# Patient Record
Sex: FEMALE | Race: WHITE | NOT HISPANIC OR LATINO | Employment: OTHER | URBAN - METROPOLITAN AREA
[De-identification: names, ages, dates, MRNs, and addresses within clinical notes are randomized per-mention and may not be internally consistent; named-entity substitution may affect disease eponyms.]

---

## 2017-01-23 ENCOUNTER — TRANSCRIBE ORDERS (OUTPATIENT)
Dept: ADMINISTRATIVE | Facility: HOSPITAL | Age: 77
End: 2017-01-23

## 2017-01-23 DIAGNOSIS — Z12.31 OTHER SCREENING MAMMOGRAM: Primary | ICD-10-CM

## 2017-02-02 ENCOUNTER — HOSPITAL ENCOUNTER (OUTPATIENT)
Dept: BONE DENSITY | Facility: IMAGING CENTER | Age: 77
Discharge: HOME | End: 2017-02-02
Payer: MEDICARE

## 2017-02-02 DIAGNOSIS — Z12.31 OTHER SCREENING MAMMOGRAM: ICD-10-CM

## 2017-12-28 ENCOUNTER — TRANSCRIBE ORDERS (OUTPATIENT)
Dept: ADMINISTRATIVE | Facility: HOSPITAL | Age: 77
End: 2017-12-28

## 2017-12-28 DIAGNOSIS — Z12.39 SCREENING BREAST EXAMINATION: Primary | ICD-10-CM

## 2017-12-29 ENCOUNTER — HOSPITAL ENCOUNTER (OUTPATIENT)
Dept: RADIOLOGY | Age: 77
Discharge: HOME | End: 2017-12-29
Payer: MEDICARE

## 2017-12-29 ENCOUNTER — GENERIC CONVERSION - ENCOUNTER (OUTPATIENT)
Dept: OTHER | Facility: OTHER | Age: 77
End: 2017-12-29

## 2017-12-29 DIAGNOSIS — Z12.39 SCREENING BREAST EXAMINATION: ICD-10-CM

## 2017-12-29 PROCEDURE — G0202 SCR MAMMO BI INCL CAD: HCPCS

## 2018-09-26 ENCOUNTER — APPOINTMENT (EMERGENCY)
Dept: RADIOLOGY | Facility: HOSPITAL | Age: 78
End: 2018-09-26
Payer: MEDICARE

## 2018-09-26 ENCOUNTER — HOSPITAL ENCOUNTER (EMERGENCY)
Facility: HOSPITAL | Age: 78
Discharge: HOME | End: 2018-09-26
Admitting: EMERGENCY MEDICINE
Payer: MEDICARE

## 2018-09-26 VITALS
RESPIRATION RATE: 18 BRPM | HEIGHT: 65 IN | TEMPERATURE: 98.9 F | WEIGHT: 172.62 LBS | SYSTOLIC BLOOD PRESSURE: 142 MMHG | BODY MASS INDEX: 28.76 KG/M2 | DIASTOLIC BLOOD PRESSURE: 77 MMHG | OXYGEN SATURATION: 99 % | HEART RATE: 75 BPM

## 2018-09-26 DIAGNOSIS — G56.01 MILD CARPAL TUNNEL SYNDROME, RIGHT: Primary | ICD-10-CM

## 2018-09-26 PROCEDURE — 73110 X-RAY EXAM OF WRIST: CPT

## 2018-09-26 PROCEDURE — 99283 EMERGENCY DEPT VISIT LOW MDM: CPT

## 2018-09-26 RX ORDER — NAPROXEN 375 MG/1
375 TABLET ORAL 2 TIMES DAILY PRN
Qty: 20 TABLET | Refills: 0 | Status: SHIPPED | OUTPATIENT
Start: 2018-09-26 | End: 2020-07-01 | Stop reason: HOSPADM

## 2018-09-26 RX ORDER — NAPROXEN 250 MG/1
375 TABLET ORAL ONCE
Status: COMPLETED | OUTPATIENT
Start: 2018-09-26 | End: 2018-09-26

## 2018-09-26 RX ADMIN — NAPROXEN 375 MG: 250 TABLET ORAL at 02:01

## 2018-09-26 NOTE — ED PROVIDER NOTES
History  Chief Complaint   Patient presents with    Wrist Pain     Presents for pain to right inner wrist  Reports cutting through frozen bread approx  2000 last evevning with no pain at that time  Newly increased pain and limited ROM to right wrist at this time  No prior hx  injury to wrist  No meds PTO  Wrist Pain, right  -2000 this evening, patient cut through frozen bread  -Patient said she believes she "strained something"  -2130 patient developed volar aspect wrist pain  -Described as dull  -Pain is localized to wrist  -Only on the volar aspect of wrist  -Has not happened in the past  -no numbness or tingling in fingers  -normal active and passive range of motion as per patient  -self reports tenderness to palpation along the volar aspect of her wrist  -no noted ecchymoses after the injury            Prior to Admission Medications   Prescriptions Last Dose Informant Patient Reported? Taking?   sertraline (ZOLOFT) 50 mg tablet   Yes Yes   Sig: Take 1 tablet by mouth daily      Facility-Administered Medications: None       History reviewed  No pertinent past medical history  Past Surgical History:   Procedure Laterality Date    REPLACEMENT TOTAL KNEE Right 2004    TOTAL HIP ARTHROPLASTY Right 2006       History reviewed  No pertinent family history  I have reviewed and agree with the history as documented  Social History   Substance Use Topics    Smoking status: Never Smoker    Smokeless tobacco: Never Used    Alcohol use No        Review of Systems  Review of Systems: The Patient/Parent Denies the following: Negative, Except as noted in HPI  Physical Exam  Physical Exam  General: 69 y o  female patient, who appears their stated age, in mild distress  Skin: No rashes, masses, or lesions noted  Cardiac: Regular rate and rhythm, with no noted murmurs, rubs, or gallops  Pulmonary: Normal Appearance  Clear to auscultation, with no noted rales, rhonchi, or wheezes     MSK: Right Elbow: Normal Active ROM, Normal Passive ROM, No gross deformities noted, Normal Muscle Strength, Right Wrist/Hand: Positive Phalen's Test (Positive is numbness or parasthesia of the fingers, with the wrists in deep flexion, indicating carpel tunnel syndrome), Positive Tinel's Test (Positive is numbness or parasthesia with palpation over the median nerve, indicating carpel tunnel syndrome), No pain wit palpation of the anatomical snuffbox, Normal Active ROM, Normal Passive ROM, No gross deformities noted, Normal Muscle Strength, No pain with flexion, Positive pain with extension, No pain with rotation  All other Joints grossly normal           Vital Signs  ED Triage Vitals [09/26/18 0059]   Temperature Pulse Respirations Blood Pressure SpO2   98 9 °F (37 2 °C) 61 16 147/72 99 %      Temp Source Heart Rate Source Patient Position - Orthostatic VS BP Location FiO2 (%)   Oral Monitor Sitting Right arm --      Pain Score       Worst Possible Pain           Vitals:    09/26/18 0059 09/26/18 0203   BP: 147/72 142/77   Pulse: 61 75   Patient Position - Orthostatic VS: Sitting Sitting       Visual Acuity      ED Medications  Medications   naproxen (NAPROSYN) tablet 375 mg (375 mg Oral Given 9/26/18 0201)       Diagnostic Studies  Results Reviewed     None                 XR wrist 3+ views RIGHT   Final Result by Lew Avina MD (09/26 3332)      No acute osseous abnormality  Degenerative changes as described  Workstation performed: [id number]                    Procedures  Procedures       Phone Contacts  ED Phone Contact    ED Course                               MDM  Number of Diagnoses or Management Options  Mild carpal tunnel syndrome, right: new and requires workup  Diagnosis management comments: Most likely diagnosis is mild right carpal tunnel syndrome    Primary considerations diagnosis include the patient's acute onset of symptoms, the presence of the positive Tinel sign, presence of negative Finkelstein's test, as well as the positive Phalen test   In addition, the distribution of the patient's discomfort is following the median nerve, which is classic  As such, the patient was instructed to follow up with her primary care provider, as well utilize a cock-up wrist splint for symptoms  Amount and/or Complexity of Data Reviewed  Decide to obtain previous medical records or to obtain history from someone other than the patient: yes  Obtain history from someone other than the patient: yes  Review and summarize past medical records: yes  Discuss the patient with other providers: yes  Independent visualization of images, tracings, or specimens: yes    Risk of Complications, Morbidity, and/or Mortality  Presenting problems: moderate  Diagnostic procedures: moderate  Management options: moderate    Patient Progress  Patient progress: stable    CritCare Time    Disposition  Final diagnoses:   Mild carpal tunnel syndrome, right     Time reflects when diagnosis was documented in both MDM as applicable and the Disposition within this note     Time User Action Codes Description Comment    9/26/2018  1:55 AM Annette Duarte Add [G56 01] Mild carpal tunnel syndrome, right       ED Disposition     ED Disposition Condition Comment    Discharge  Jose E Mckenzie discharge to home/self care      Condition at discharge: Good        Follow-up Information     Follow up With Specialties Details Why [address], DO Family Medicine In 3 days If symptoms worsen, As needed [address] [facility]  [address]  [facility] [id number] [phone]            Discharge Medication List as of 9/26/2018  1:57 AM      START taking these medications    Details   naproxen (NAPROSYN) 375 mg tablet Take 1 tablet (375 mg total) by mouth 2 (two) times a day as needed for mild pain, Starting Wed 9/26/2018, Print         CONTINUE these medications which have NOT CHANGED    Details   sertraline (ZOLOFT) 50 mg tablet Take 1 tablet by mouth daily, Starting Wed 2/21/2018, Historical Med           No discharge procedures on file      ED Provider  Electronically Signed by           Kamron Ramos PA-C  09/27/18 6655

## 2018-09-26 NOTE — DISCHARGE INSTRUCTIONS
Carpal Tunnel Syndrome   WHAT YOU SHOULD KNOW:   Carpal tunnel syndrome (CTS) is a condition where there is increased pressure on the median nerve in the wrist  The median nerve controls muscles and feeling in the hand  Pressure may come from overuse and swelling of ligaments in the wrist    INSTRUCTIONS:   Medicines:   · NSAIDs:  These medicines decrease swelling and pain  NSAIDs are available without a doctor's order  Ask your primary healthcare provider which medicine is right for you and how much to take  Take as directed  NSAIDs can cause stomach bleeding or kidney problems if not taken correctly  · Take your medicine as directed  Call your healthcare provider if you think your medicine is not helping or if you have side effects  Tell him if you are allergic to any medicine  Keep a list of the medicines, vitamins, and herbs you take  Include the amounts, and when and why you take them  Bring the list or the pill bottles to follow-up visits  Carry your medicine list with you in case of an emergency  Follow up with your healthcare provider as directed:  Write down your questions so you remember to ask them during your visits  Manage your symptoms:   · Use ice:  Ice helps decrease swelling and pain in your wrist  Ice may also help prevent tissue damage  Use an ice pack or put crushed ice in a plastic bag  Cover the ice pack with a towel and place it on your wrist for 15 to 20 minutes every hour  · Get physical and occupational therapy:  Physical therapists will show you ways to exercise and strengthen your wrist  Occupational therapists will show you safe ways to use your wrist while you do your usual activities  · Rest your hands:  Let your hands rest for a short time between repetitive motions, such as typing  If you feel pain, stop what you are doing and gently massage your wrist and hand  · Use a wrist splint: This keeps your wrist straight or in a slightly bent position   A wrist splint decreases pressure on the median nerve by letting your wrist rest  You may need to wear the splint for up to 8 weeks  You may need to wear your wrist splint at night  · Evaluate your work habits:  Ask about ways to modify your work to help decrease your symptoms  Contact your primary healthcare provider if:   · Your symptoms are worse than before  · You have questions or concerns about your condition or care  Return to the emergency department if:   · You suddenly lose feeling and cannot move your hand  · Your hand suddenly changes color  © 2014 [employer] is for End User's use only and may not be sold, redistributed or otherwise used for commercial purposes  All illustrations and images included in CareNotes® are the copyrighted property of [employer]  or Luis Gonzalez  The above information is an  only  It is not intended as medical advice for individual conditions or treatments  Talk to your doctor, nurse or pharmacist before following any medical regimen to see if it is safe and effective for you

## 2018-10-11 ENCOUNTER — TRANSCRIBE ORDERS (OUTPATIENT)
Dept: PHYSICAL THERAPY | Facility: REHABILITATION | Age: 78
End: 2018-10-11

## 2018-10-11 ENCOUNTER — EVALUATION (OUTPATIENT)
Dept: PHYSICAL THERAPY | Facility: REHABILITATION | Age: 78
End: 2018-10-11
Payer: MEDICARE

## 2018-10-11 DIAGNOSIS — M53.3 SI (SACROILIAC) JOINT DYSFUNCTION: Primary | ICD-10-CM

## 2018-10-11 DIAGNOSIS — M53.3 DISORDER OF SACRUM: Primary | ICD-10-CM

## 2018-10-11 PROCEDURE — G8990 OTHER PT/OT CURRENT STATUS: HCPCS | Performed by: PHYSICAL THERAPIST

## 2018-10-11 PROCEDURE — 97161 PT EVAL LOW COMPLEX 20 MIN: CPT | Performed by: PHYSICAL THERAPIST

## 2018-10-11 PROCEDURE — G8991 OTHER PT/OT GOAL STATUS: HCPCS | Performed by: PHYSICAL THERAPIST

## 2018-10-11 PROCEDURE — 97110 THERAPEUTIC EXERCISES: CPT | Performed by: PHYSICAL THERAPIST

## 2018-10-11 NOTE — LETTER
2018    Park Dakin, [address] [facility] [address]    Patient: Di Sheikh   YOB: 1940   Date of Visit: 10/11/2018     Encounter Diagnosis     ICD-10-CM    1  SI (sacroiliac) joint dysfunction M53 3        Dear Dr Harvey Cleaves:    Please review the attached Plan of Care from [facility] recent visit  Please verify that you agree therapy should continue by signing the attached document and sending it back to our office  If you have any questions or concerns, please don't hesitate to call  Sincerely,    Rosana Stephen, PT      Referring Provider:      I certify that I have read the below Plan of Care and certify the need for these services furnished under this plan of treatment while under my care  Park Dakin, MD  [address]: [phone]          PT Evaluation     Today's date: 10/11/2018  Patient name: Di Sheikh  : 1940  MRN: [id number]  Referring provider: Gui Olmos MD  Dx:   Encounter Diagnosis     ICD-10-CM    1  SI (sacroiliac) joint dysfunction M53 3                   Assessment  Impairments: abnormal gait, abnormal muscle firing, abnormal or restricted ROM and impaired physical strength    Symptom irritability: low  Assessment details: Di Sheikh is a 69 y o  female with significant medical history of  who presents with chief complaint of right sided SI joint pain   Oneil Adjutant presents with evaluation findings of leg length discrepancy, decreased hip ROM and strength and significant trunk lean throughout ambulation  These deficits are manifested functionally in difficulty with prolonged sitting and prolonged walking and standing  Oneil Adjutant will benefit from physical therapy to improve hip ROM and strength, normalize gait pattern and allow for decreased pain during ambulation        Understanding of Dx/Px/POC: good   Prognosis: good    Plan  Patient would benefit from: skilled physical therapy  Planned therapy interventions: neuromuscular re-education, strengthening, stretching, manual therapy, therapeutic exercise, therapeutic activities, home exercise program, patient education, postural training and orthotic fitting/training  Frequency: 2x week  Duration in weeks: 12  Treatment plan discussed with: patient        Subjective Evaluation    History of Present Illness  Mechanism of injury: Pt reports that she saw an ortho who told her she has a problem with her SI joint  States that her symptoms began years ago and was intermittent and became more constant in March  Pt reports that's she is getting shots in her back of a holistic medicine  She describes her pain as intense and stabbing  States that currently she has no pain  States she has relief with heat  Pt reports that the pain is centered over the L PSIS  Pt denies pain down her leg  She denies numbness and tingling  States the pain becomes worse with prolonged sitting  States she feels good when her back is flat  Pt denies change in bowel/bladder habits  States she also has pain with prolonged standing  States the pain is present about 50% of the time  Pt reports she had an MRI which showed degenerative changes  Pt goals for therapy include decreasing pain and to be able to walk without pain  Pain  At best pain ratin  At worst pain rating: 10          Objective     Special Questions  Negative for night pain, disturbed sleep, bladder dysfunction, bowel dysfunction, saddle (S4) numbness and history of trauma    Palpation     Additional Palpation Details  No pain over paraspinals or throughout lumbar spine  Specific point tenderness over R PSIS with no pain over posterior musculature of lateral hip  Tenderness     Lumbar Spine  No tenderness in the spinous process, facet joint and pedicle  Left Hip   No tenderness in the PSIS  Right Hip   Tenderness in the PSIS       Active Range of Motion     Lumbar   Flexion: WFL  Extension: WFL  Left lateral flexion: WFL  Right lateral flexion: [facility]    Additional Active Range of Motion Details  Pain upon rising from flexion no pain with other motions    Strength/Myotome Testing     Left Hip   Planes of Motion   Flexion: 4-  Extension: 4  Abduction: 4    Right Hip   Planes of Motion   Flexion: 4  Extension: 4-  Abduction: 4-    Muscle Activation     Additional Muscle Activation Details  Fair TA contraction    Tests     Lumbar     Left   Negative crossed SLR  Right   Negative passive SLR  Left Pelvic Girdle/Sacrum   Negative: sacral spring and thigh thrust      Right Pelvic Girdle/Sacrum   Negative: sacrum compression, sacral spring and thigh thrust      Additional Tests Details  Pain over reproduced with palpation of PSIS    General Comments     Lumbar Comments  Observable side lean to the L during ambulation  R leg longer in a supine position and a supine to knees bent position         Flowsheet Rows      Most Recent Value   PT/OT G-Codes   Current Score  51   Projected Score  62          Precautions: R hip replacement    Daily Treatment Diary     Manual  10/11            SI PA mobs or IR mobs                                                                      Exercise Diary  10/11            Heel raise fitting/education 10'            Bike             TA contraction             glute sets/bridge             clamshell                                                                                                                                                                                                                    Modalities

## 2018-10-11 NOTE — LETTER
December 3, 2018    Nancy Melendez DO  [address] [address]    Patient: Lisa Platt   YOB: 1940   Date of Visit: 10/11/2018     Encounter Diagnosis     ICD-10-CM    1  SI (sacroiliac) joint dysfunction M53 3        Dear Dr [employer]:    Please review the attached Plan of Care from [facility] recent visit  Please verify that you agree therapy should continue by signing the attached document and sending it back to our office  If you have any questions or concerns, please don't hesitate to call  Sincerely,    Tato Chavezch, PT      Referring Provider:      I certify that I have read the below Plan of Care and certify the need for these services furnished under this plan of treatment while under my care  Nancy Al   [address] [address]  VIA Facsimile: [phone]          PT Evaluation     Today's date: 12/3/2018  Patient name: Lisa Platt  : 1940  MRN: [id number]  Referring provider: Huong Jenkins DO  Dx:   Encounter Diagnosis     ICD-10-CM    1  SI (sacroiliac) joint dysfunction M53 3        Start Time: 1200  Stop Time: 1245  Total time in clinic (min): 45 minutes    Assessment  Impairments: abnormal gait, abnormal muscle firing, abnormal or restricted ROM and impaired physical strength    Symptom irritability: low  Assessment details: Lisa Platt is a 69 y o  female with significant medical history of  who presents with chief complaint of right sided SI joint pain   Khoa Roman presents with evaluation findings of leg length discrepancy, decreased hip ROM and strength and significant trunk lean throughout ambulation  These deficits are manifested functionally in difficulty with prolonged sitting and prolonged walking and standing  Khoa Roman will benefit from physical therapy to improve hip ROM and strength, normalize gait pattern and allow for decreased pain during ambulation        Understanding of Dx/Px/POC: good   Prognosis: good    Plan  Patient would benefit from: skilled physical therapy  Planned therapy interventions: neuromuscular re-education, strengthening, stretching, manual therapy, therapeutic exercise, therapeutic activities, home exercise program, patient education, postural training and orthotic fitting/training  Frequency: 2x week  Duration in weeks: 12  Treatment plan discussed with: patient        Subjective Evaluation    History of Present Illness  Mechanism of injury: Pt reports that she saw an ortho who told her she has a problem with her SI joint  States that her symptoms began years ago and was intermittent and became more constant in March  Pt reports that's she is getting shots in her back of a holistic medicine  She describes her pain as intense and stabbing  States that currently she has no pain  States she has relief with heat  Pt reports that the pain is centered over the L PSIS  Pt denies pain down her leg  She denies numbness and tingling  States the pain becomes worse with prolonged sitting  States she feels good when her back is flat  Pt denies change in bowel/bladder habits  States she also has pain with prolonged standing  States the pain is present about 50% of the time  Pt reports she had an MRI which showed degenerative changes  Pt goals for therapy include decreasing pain and to be able to walk without pain  Pain  At best pain ratin  At worst pain rating: 10          Objective     Special Questions  Negative for night pain, disturbed sleep, bladder dysfunction, bowel dysfunction, saddle (S4) numbness and history of trauma    Palpation     Additional Palpation Details  No pain over paraspinals or throughout lumbar spine  Specific point tenderness over R PSIS with no pain over posterior musculature of lateral hip  Tenderness     Lumbar Spine  No tenderness in the spinous process, facet joint and pedicle  Left Hip   No tenderness in the PSIS       Right Hip   Tenderness in the PSIS  Active Range of Motion     Lumbar   Flexion: WFL  Extension: WFL  Left lateral flexion: WFL  Right lateral flexion: [facility]    Additional Active Range of Motion Details  Pain upon rising from flexion no pain with other motions    Strength/Myotome Testing     Left Hip   Planes of Motion   Flexion: 4-  Extension: 4  Abduction: 4    Right Hip   Planes of Motion   Flexion: 4  Extension: 4-  Abduction: 4-    Muscle Activation     Additional Muscle Activation Details  Fair TA contraction    Tests     Lumbar     Left   Negative crossed SLR  Right   Negative passive SLR  Left Pelvic Girdle/Sacrum   Negative: sacral spring and thigh thrust      Right Pelvic Girdle/Sacrum   Negative: sacrum compression, sacral spring and thigh thrust      Additional Tests Details  Pain over reproduced with palpation of PSIS    General Comments     Lumbar Comments  Observable side lean to the L during ambulation  R leg longer in a supine position and a supine to knees bent position         Flowsheet Rows      Most Recent Value   PT/OT G-Codes   Current Score  51   Projected Score  62   FOTO information reviewed  Yes   Assessment Type  Evaluation   G code set  Other PT/OT Primary   Other PT Primary Current Status ()  CK   Other PT Primary Goal Status ()  CJ          Precautions: R hip replacement    Daily Treatment Diary     Manual  10/11            SI PA mobs or IR mobs                                                                      Exercise Diary  10/11            Heel raise fitting/education 10'            Bike             TA contraction             glute sets/bridge             clamshell                                                                                                                                                                                                                    Modalities

## 2018-10-11 NOTE — LETTER
2018    Niki Santos MD  [facility] [id number]    Patient: Anne Fishman   YOB: 1940   Date of Visit: 10/11/2018     Encounter Diagnosis     ICD-10-CM    1  SI (sacroiliac) joint dysfunction M53 3        Dear Dr Joe Tejeda:    Please review the attached Plan of Care from [facility] recent visit  Please verify that you agree therapy should continue by signing the attached document and sending it back to our office  If you have any questions or concerns, please don't hesitate to call  Sincerely,    Harris Gonzalez, PT      Referring Provider:      I certify that I have read the below Plan of Care and certify the need for these services furnished under this plan of treatment while under my care  Niki Santos MD   [address]: [phone]          PT Evaluation     Today's date: 2018  Patient name: Anne Fishman  : 1940  MRN: [id number]  Referring provider: Janae Clark MD  Dx:   Encounter Diagnosis     ICD-10-CM    1  SI (sacroiliac) joint dysfunction M53 3        Start Time: 1200  Stop Time: 2514  Total time in clinic (min): 45 minutes    Assessment  Impairments: abnormal gait, abnormal muscle firing, abnormal or restricted ROM and impaired physical strength    Symptom irritability: low  Assessment details: Anne Fishman is a 69 y o  female with significant medical history of  who presents with chief complaint of right sided SI joint pain   Elvin Julien presents with evaluation findings of leg length discrepancy, decreased hip ROM and strength and significant trunk lean throughout ambulation  These deficits are manifested functionally in difficulty with prolonged sitting and prolonged walking and standing  Elvin Julien will benefit from physical therapy to improve hip ROM and strength, normalize gait pattern and allow for decreased pain during ambulation        Understanding of Dx/Px/POC: good   Prognosis: good    Plan  Patient would benefit from: skilled physical therapy  Planned therapy interventions: neuromuscular re-education, strengthening, stretching, manual therapy, therapeutic exercise, therapeutic activities, home exercise program, patient education, postural training and orthotic fitting/training  Frequency: 2x week  Duration in weeks: 12  Treatment plan discussed with: patient        Subjective Evaluation    History of Present Illness  Mechanism of injury: Pt reports that she saw an ortho who told her she has a problem with her SI joint  States that her symptoms began years ago and was intermittent and became more constant in March  Pt reports that's she is getting shots in her back of a holistic medicine  She describes her pain as intense and stabbing  States that currently she has no pain  States she has relief with heat  Pt reports that the pain is centered over the L PSIS  Pt denies pain down her leg  She denies numbness and tingling  States the pain becomes worse with prolonged sitting  States she feels good when her back is flat  Pt denies change in bowel/bladder habits  States she also has pain with prolonged standing  States the pain is present about 50% of the time  Pt reports she had an MRI which showed degenerative changes  Pt goals for therapy include decreasing pain and to be able to walk without pain  Pain  At best pain ratin  At worst pain rating: 10          Objective     Special Questions  Negative for night pain, disturbed sleep, bladder dysfunction, bowel dysfunction, saddle (S4) numbness and history of trauma    Palpation     Additional Palpation Details  No pain over paraspinals or throughout lumbar spine  Specific point tenderness over R PSIS with no pain over posterior musculature of lateral hip  Tenderness     Lumbar Spine  No tenderness in the spinous process, facet joint and pedicle  Left Hip   No tenderness in the PSIS       Right Hip   Tenderness in the PSIS  Active Range of Motion     Lumbar   Flexion: WFL  Extension: WFL  Left lateral flexion: WFL  Right lateral flexion: [facility]    Additional Active Range of Motion Details  Pain upon rising from flexion no pain with other motions    Strength/Myotome Testing     Left Hip   Planes of Motion   Flexion: 4-  Extension: 4  Abduction: 4    Right Hip   Planes of Motion   Flexion: 4  Extension: 4-  Abduction: 4-    Muscle Activation     Additional Muscle Activation Details  Fair TA contraction    Tests     Lumbar     Left   Negative crossed SLR  Right   Negative passive SLR  Left Pelvic Girdle/Sacrum   Negative: sacral spring and thigh thrust      Right Pelvic Girdle/Sacrum   Negative: sacrum compression, sacral spring and thigh thrust      Additional Tests Details  Pain over reproduced with palpation of PSIS    General Comments     Lumbar Comments  Observable side lean to the L during ambulation  R leg longer in a supine position and a supine to knees bent position         Flowsheet Rows      Most Recent Value   PT/OT G-Codes   Current Score  51   Projected Score  62   FOTO information reviewed  Yes   Assessment Type  Evaluation   G code set  Other PT/OT Primary   Other PT Primary Current Status ()  CK   Other PT Primary Goal Status ()  CJ          Precautions: R hip replacement    Daily Treatment Diary     Manual  10/11            SI PA mobs or IR mobs                                                                      Exercise Diary  10/11            Heel raise fitting/education 10'            Bike             TA contraction             glute sets/bridge             clamshell                                                                                                                                                                                                                    Modalities

## 2018-10-11 NOTE — PROGRESS NOTES
PT Evaluation     Today's date: 12/3/2018  Patient name: Hyacinth Vazquez  : 1940  MRN: [id number]  Referring provider: Sharon Holman DO  Dx:   Encounter Diagnosis     ICD-10-CM    1  SI (sacroiliac) joint dysfunction M53 3        Start Time: 1200  Stop Time: 1245  Total time in clinic (min): 45 minutes    Assessment  Impairments: abnormal gait, abnormal muscle firing, abnormal or restricted ROM and impaired physical strength    Symptom irritability: low  Assessment details: Hyacinth Vazquez is a 69 y o  female with significant medical history of  who presents with chief complaint of right sided SI joint pain   Meryle Broker presents with evaluation findings of leg length discrepancy, decreased hip ROM and strength and significant trunk lean throughout ambulation  These deficits are manifested functionally in difficulty with prolonged sitting and prolonged walking and standing  Meryle Broker will benefit from physical therapy to improve hip ROM and strength, normalize gait pattern and allow for decreased pain during ambulation  Understanding of Dx/Px/POC: good   Prognosis: good    Plan  Patient would benefit from: skilled physical therapy  Planned therapy interventions: neuromuscular re-education, strengthening, stretching, manual therapy, therapeutic exercise, therapeutic activities, home exercise program, patient education, postural training and orthotic fitting/training  Frequency: 2x week  Duration in weeks: 12  Treatment plan discussed with: patient        Subjective Evaluation    History of Present Illness  Mechanism of injury: Pt reports that she saw an ortho who told her she has a problem with her SI joint  States that her symptoms began years ago and was intermittent and became more constant in March  Pt reports that's she is getting shots in her back of a holistic medicine  She describes her pain as intense and stabbing  States that currently she has no pain  States she has relief with heat   Pt reports that the pain is centered over the L PSIS  Pt denies pain down her leg  She denies numbness and tingling  States the pain becomes worse with prolonged sitting  States she feels good when her back is flat  Pt denies change in bowel/bladder habits  States she also has pain with prolonged standing  States the pain is present about 50% of the time  Pt reports she had an MRI which showed degenerative changes  Pt goals for therapy include decreasing pain and to be able to walk without pain  Pain  At best pain ratin  At worst pain rating: 10          Objective     Special Questions  Negative for night pain, disturbed sleep, bladder dysfunction, bowel dysfunction, saddle (S4) numbness and history of trauma    Palpation     Additional Palpation Details  No pain over paraspinals or throughout lumbar spine  Specific point tenderness over R PSIS with no pain over posterior musculature of lateral hip  Tenderness     Lumbar Spine  No tenderness in the spinous process, facet joint and pedicle  Left Hip   No tenderness in the PSIS  Right Hip   Tenderness in the PSIS  Active Range of Motion     Lumbar   Flexion: WFL  Extension: WFL  Left lateral flexion: WFL  Right lateral flexion: [facility]    Additional Active Range of Motion Details  Pain upon rising from flexion no pain with other motions    Strength/Myotome Testing     Left Hip   Planes of Motion   Flexion: 4-  Extension: 4  Abduction: 4    Right Hip   Planes of Motion   Flexion: 4  Extension: 4-  Abduction: 4-    Muscle Activation     Additional Muscle Activation Details  Fair TA contraction    Tests     Lumbar     Left   Negative crossed SLR  Right   Negative passive SLR       Left Pelvic Girdle/Sacrum   Negative: sacral spring and thigh thrust      Right Pelvic Girdle/Sacrum   Negative: sacrum compression, sacral spring and thigh thrust      Additional Tests Details  Pain over reproduced with palpation of PSIS    General Comments     Lumbar Comments  Observable side lean to the L during ambulation  R leg longer in a supine position and a supine to knees bent position         Flowsheet Rows      Most Recent Value   PT/OT G-Codes   Current Score  51   Projected Score  62   FOTO information reviewed  Yes   Assessment Type  Evaluation   G code set  Other PT/OT Primary   Other PT Primary Current Status ()  CK   Other PT Primary Goal Status ()  CJ          Precautions: R hip replacement    Daily Treatment Diary     Manual  10/11            SI PA mobs or IR mobs                                                                      Exercise Diary  10/11            Heel raise fitting/education 10'            Bike             TA contraction             glute sets/bridge             clamshell                                                                                                                                                                                                                    Modalities

## 2018-10-15 ENCOUNTER — OFFICE VISIT (OUTPATIENT)
Dept: PHYSICAL THERAPY | Facility: REHABILITATION | Age: 78
End: 2018-10-15
Payer: MEDICARE

## 2018-10-15 DIAGNOSIS — M53.3 SI (SACROILIAC) JOINT DYSFUNCTION: Primary | ICD-10-CM

## 2018-10-15 PROCEDURE — 97110 THERAPEUTIC EXERCISES: CPT | Performed by: PHYSICAL THERAPIST

## 2018-10-15 PROCEDURE — 97112 NEUROMUSCULAR REEDUCATION: CPT | Performed by: PHYSICAL THERAPIST

## 2018-10-15 NOTE — PROGRESS NOTES
Daily Note     Today's date: 10/15/2018  Patient name: Annalee Cruz  : 1940  MRN: [id number]  Referring provider: Wyatt Barrera MD  Dx:   Encounter Diagnosis     ICD-10-CM    1  SI (sacroiliac) joint dysfunction M53 3                   Subjective: "My back feels so good it scares me " Pt does states she had an injection the day before last visit but states she notices a difference if she does not wear her heel lift  Objective: See treatment diary below  Precautions: R hip replacement     Daily Treatment Diary      Manual  10/11  10/15                   SI PA mobs or IR mobs     NP                   Hip                                                                                                     Exercise Diary  10/11  10/15                   Heel raise fitting/education 10'                     Bike    8'                   TA contraction    10x3 breaths                   glute sets/bridge    2x10                   clamshell    2x10 each                                                                                                                                                                                                                                                                                                                                                                                                 Modalities                                                                                                      Assessment: Pt shows significant improvement in gait pattern upon arrival  Pt was able to perform TE without complaints of pain  Exhibited some lumbar pain with rolling which decreased when she was instructed to contract TA and roll her spine as a unit  Pt provided with HEP and information for ordering additional heel lifts  Plan: Progress treatment as tolerated

## 2018-10-18 ENCOUNTER — OFFICE VISIT (OUTPATIENT)
Dept: PHYSICAL THERAPY | Facility: REHABILITATION | Age: 78
End: 2018-10-18
Payer: MEDICARE

## 2018-10-18 DIAGNOSIS — M53.3 SI (SACROILIAC) JOINT DYSFUNCTION: Primary | ICD-10-CM

## 2018-10-18 PROCEDURE — 97110 THERAPEUTIC EXERCISES: CPT | Performed by: PHYSICAL THERAPIST

## 2018-10-18 PROCEDURE — 97112 NEUROMUSCULAR REEDUCATION: CPT | Performed by: PHYSICAL THERAPIST

## 2018-10-18 NOTE — PROGRESS NOTES
Daily Note     Today's date: 10/18/2018  Patient name: Lisa Platt  : 1940  MRN: [id number]  Referring provider: Radha Lay MD  Dx:   Encounter Diagnosis     ICD-10-CM    1  SI (sacroiliac) joint dysfunction M53 3                   Subjective: Pt reports that she has been feeling good  Would like to increase her HEP for home  Objective: See treatment diary below  Precautions: R hip replacement     Daily Treatment Diary      Manual  10/11  10/15                   SI PA mobs or IR mobs     NP                   Hip                                                                                                     Exercise Diary  10/11  10/15  10/18                 Heel raise fitting/education 10'                     Bike    8'  10'                 TA contraction    10x3 breaths  dc                 glute sets/bridge    2x10  2x15                 clamshell    2x10 each  2x10 each                 sit to stands                       Multifidus press      OTB 20 each                  TA with marching      20 each                   step ups                        lateral step ups      10 each                                                                                                                                                                                                                                                                       Modalities                                                                                                      Assessment: Pt  Required cuing for clamshells  Good performances of bridges  Pt reported fatigue during clamshells  Pt tx 1:1 with PT DADA  Plan: Progress treatment as tolerated

## 2018-10-22 ENCOUNTER — OFFICE VISIT (OUTPATIENT)
Dept: PHYSICAL THERAPY | Facility: REHABILITATION | Age: 78
End: 2018-10-22
Payer: MEDICARE

## 2018-10-22 DIAGNOSIS — M53.3 SI (SACROILIAC) JOINT DYSFUNCTION: Primary | ICD-10-CM

## 2018-10-22 PROCEDURE — 97110 THERAPEUTIC EXERCISES: CPT | Performed by: PHYSICAL THERAPIST

## 2018-10-22 PROCEDURE — 97112 NEUROMUSCULAR REEDUCATION: CPT | Performed by: PHYSICAL THERAPIST

## 2018-10-22 NOTE — PROGRESS NOTES
Daily Note     Today's date: 10/22/2018  Patient name: Henrique Mcduffie  : 1940  MRN: [id number]  Referring provider: Gil Rankin MD  Dx:   Encounter Diagnosis     ICD-10-CM    1  SI (sacroiliac) joint dysfunction M53 3                   Subjective: Pt reports that she feels good upon arrival  Presents with heel lifts that she purchased from [employer]  Objective: See treatment diary below  Precautions: R hip replacement     Daily Treatment Diary      Manual  10/11  10/15                   SI PA mobs or IR mobs     NP                   Hip                                                                                                     Exercise Diary  10/11  10/15  10/18  10/22               Heel raise fitting/education 10'                     Bike    8'  10'  8'               TA contraction    10x3 breaths  dc                 glute sets/bridge    2x10  2x15  3x10               clamshell    2x10 each  2x10 each  2x10 each               sit to stands                       Multifidus press      OTB 20 each  OTB 20 each                TA with marching      20 each   20 each                step ups                        lateral step ups      10 each 1x10 and 1x5                                                                                                                                                                                                                                                                     Modalities                                                                                                      Assessment: Pt was able to perform increased reps of step ups this visit but did report fatigue post treatment  Plan: Progress treatment as tolerated

## 2018-10-25 ENCOUNTER — APPOINTMENT (OUTPATIENT)
Dept: PHYSICAL THERAPY | Facility: REHABILITATION | Age: 78
End: 2018-10-25
Payer: MEDICARE

## 2018-10-29 ENCOUNTER — OFFICE VISIT (OUTPATIENT)
Dept: PHYSICAL THERAPY | Facility: REHABILITATION | Age: 78
End: 2018-10-29
Payer: MEDICARE

## 2018-10-29 DIAGNOSIS — M53.3 SI (SACROILIAC) JOINT DYSFUNCTION: Primary | ICD-10-CM

## 2018-10-29 PROCEDURE — 97110 THERAPEUTIC EXERCISES: CPT | Performed by: PHYSICAL THERAPIST

## 2018-10-29 PROCEDURE — 97112 NEUROMUSCULAR REEDUCATION: CPT | Performed by: PHYSICAL THERAPIST

## 2018-11-01 ENCOUNTER — OFFICE VISIT (OUTPATIENT)
Dept: PHYSICAL THERAPY | Facility: REHABILITATION | Age: 78
End: 2018-11-01
Payer: MEDICARE

## 2018-11-01 DIAGNOSIS — M53.3 SI (SACROILIAC) JOINT DYSFUNCTION: Primary | ICD-10-CM

## 2018-11-01 PROCEDURE — G8991 OTHER PT/OT GOAL STATUS: HCPCS | Performed by: PHYSICAL THERAPIST

## 2018-11-01 PROCEDURE — 97140 MANUAL THERAPY 1/> REGIONS: CPT | Performed by: PHYSICAL THERAPIST

## 2018-11-01 PROCEDURE — 97110 THERAPEUTIC EXERCISES: CPT | Performed by: PHYSICAL THERAPIST

## 2018-11-01 PROCEDURE — G8992 OTHER PT/OT  D/C STATUS: HCPCS | Performed by: PHYSICAL THERAPIST

## 2018-11-01 PROCEDURE — 97112 NEUROMUSCULAR REEDUCATION: CPT | Performed by: PHYSICAL THERAPIST

## 2018-11-01 NOTE — PROGRESS NOTES
Daily Note/Discharge    Today's date: 2018  Patient name: April Oneal  : 1940  MRN: [id number]  Referring provider: Sidney Chavez MD  Dx:   Encounter Diagnosis     ICD-10-CM    1  SI (sacroiliac) joint dysfunction M53 3                   Subjective: Pt reports that she aches all over but no longer has specific pain in her SI joint  Continues to have pain in her lumbar spine that has been consistent in nature for several years  States she feels like the problem for which see presented to PT has resolved and she is independent in HEP  Objective: See treatment diary below  Precautions: R hip replacement     Daily Treatment Diary      Manual  10/11  10/15  10/29  11/1               SI PA mobs or IR mobs     NP                   Hip PROM     Osiel GARLAND               Lumbar PA     [address]  DADA                                                                     Exercise Diary  10/11  10/15  10/18  10/22  10/29  11/1           Heel raise fitting/education 10'                     Bike    8'  10'  8'  9'  10'           TA contraction    10x3 breaths  dc                 glute sets/bridge    2x10  2x15  3x10  3x10             clamshell    2x10 each  2x10 each  2x10 each  2x10            sit to stands                       Multifidus press      OTB 20 each  OTB 20 each  OTB 20 each              TA with marching      20 each   20 each  20 each              step ups                        lateral step ups      10 each 1x10 and 1x5  2x10 each                                                                                                                                                                                                                                                                  Modalities                                                                                                      Assessment: Pt is able to ambulate without lumbar pain and has met all goals for PT and is independent in HEP  Plan: Pt discharged to [facility] at this time

## 2018-11-05 ENCOUNTER — APPOINTMENT (OUTPATIENT)
Dept: PHYSICAL THERAPY | Facility: REHABILITATION | Age: 78
End: 2018-11-05
Payer: MEDICARE

## 2018-11-08 ENCOUNTER — APPOINTMENT (OUTPATIENT)
Dept: PHYSICAL THERAPY | Facility: REHABILITATION | Age: 78
End: 2018-11-08
Payer: MEDICARE

## 2018-11-12 ENCOUNTER — APPOINTMENT (OUTPATIENT)
Dept: PHYSICAL THERAPY | Facility: REHABILITATION | Age: 78
End: 2018-11-12
Payer: MEDICARE

## 2018-11-15 ENCOUNTER — APPOINTMENT (OUTPATIENT)
Dept: PHYSICAL THERAPY | Facility: REHABILITATION | Age: 78
End: 2018-11-15
Payer: MEDICARE

## 2018-12-03 ENCOUNTER — TRANSCRIBE ORDERS (OUTPATIENT)
Dept: PHYSICAL THERAPY | Facility: REHABILITATION | Age: 78
End: 2018-12-03

## 2018-12-03 DIAGNOSIS — M53.3 SI (SACROILIAC) JOINT DYSFUNCTION: Primary | ICD-10-CM

## 2019-03-06 ENCOUNTER — TRANSCRIBE ORDERS (OUTPATIENT)
Dept: ADMINISTRATIVE | Facility: HOSPITAL | Age: 79
End: 2019-03-06

## 2019-03-06 DIAGNOSIS — Z12.39 SCREENING BREAST EXAMINATION: Primary | ICD-10-CM

## 2019-03-06 DIAGNOSIS — M81.0 OSTEOPOROSIS, UNSPECIFIED OSTEOPOROSIS TYPE, UNSPECIFIED PATHOLOGICAL FRACTURE PRESENCE: ICD-10-CM

## 2019-05-03 ENCOUNTER — LAB REQUISITION (OUTPATIENT)
Dept: LAB | Facility: HOSPITAL | Age: 79
End: 2019-05-03
Payer: MEDICARE

## 2019-05-03 DIAGNOSIS — R10.13 EPIGASTRIC PAIN: ICD-10-CM

## 2019-05-03 PROCEDURE — 88305 TISSUE EXAM BY PATHOLOGIST: CPT | Performed by: PATHOLOGY

## 2019-08-07 ENCOUNTER — EVALUATION (OUTPATIENT)
Dept: PHYSICAL THERAPY | Facility: REHABILITATION | Age: 79
End: 2019-08-07
Payer: MEDICARE

## 2019-08-07 ENCOUNTER — TRANSCRIBE ORDERS (OUTPATIENT)
Dept: PHYSICAL THERAPY | Facility: REHABILITATION | Age: 79
End: 2019-08-07

## 2019-08-07 VITALS — DIASTOLIC BLOOD PRESSURE: 95 MMHG | SYSTOLIC BLOOD PRESSURE: 123 MMHG

## 2019-08-07 DIAGNOSIS — R42 VERTIGO: Primary | ICD-10-CM

## 2019-08-07 DIAGNOSIS — R42 DIZZINESS AND GIDDINESS: ICD-10-CM

## 2019-08-07 PROCEDURE — 97161 PT EVAL LOW COMPLEX 20 MIN: CPT | Performed by: PHYSICAL THERAPIST

## 2019-08-07 NOTE — LETTER
2019    Jeneane Schlatter, [address] [facility]  [address]  [address] [facility] [address]    Patient: Rin Grullon   YOB: 1940   Date of Visit: 2019     Encounter Diagnosis     ICD-10-CM    1  Vertigo R42    2  Dizziness and giddiness R42        Dear Dr Darci Ny: Thank you for your recent referral of Rin Pump  Please review the attached evaluation summary from [facility] recent visit  Please verify that you agree with the plan of care by signing the attached order  If you have any questions or concerns, please do not hesitate to call  I sincerely appreciate the opportunity to share in the care of one of your patients and hope to have another opportunity to work with you in the near future  Sincerely,    Marina Pierre, PT      Referring Provider:      I certify that I have read the below Plan of Care and certify the need for these services furnished under this plan of treatment while under my care  Jeneane Schlatter, [address] [facility]  [address] [facility] [address]  VIA Facsimile: [phone]          PT Evaluation     Today's date: 2019  Patient name: Rin Grullon  : 1940  MRN: [id number]  Referring provider: Walter Herrmann DO  Dx:   Encounter Diagnosis     ICD-10-CM    1  Vertigo R42    2  Dizziness and giddiness R42        Start Time: 0845  Stop Time: 45  Total time in clinic (min): 60 minutes    Assessment  Assessment details: Rin Grullon is a 67 y o  female who presents with c/o dizziness, lightheadedness and nausea/vomiting  Patient's symptoms acutely reproduced with bed mobility, including supine to sit and rolling  Upon clinical examination today, Patient presents with normal examination  DGI score 24/24, normal Rhomberg, normal saccades and VOR as well as normal positional testing  No reproduction of symptoms at this time   Patient's history indicates possible BPPV, however, this seems to have spontaneously resolved  Patient will be placed on hold at this time and has been instructed to contact PT if she has any questions or reproduction of symptoms  Understanding of Dx/Px/POC: excellent  Goals  Short Term Goals: to be achieved in 2 weeks  1) Patient to be independent with basic HEP  2) Patient to report 50% in dizziness  Long Term Goals: to be achieved by discharge  1) FOTO equal to or greater than 83   2) Patient to be independent with comprehensive HEP  3) Patient to report resolution of dizziness  Plan  Patient would benefit from: skilled physical therapy  Planned therapy interventions: abdominal trunk stabilization, activity modification, balance/weight bearing training, behavior modification, body mechanics training, breathing training, canalith repositioning, flexibility, functional ROM exercises, home exercise program, therapeutic exercise, therapeutic activities, stretching, strengthening, postural training, patient education, neuromuscular re-education, massage, manual therapy and joint mobilization  Frequency: 1-3x week  Treatment plan discussed with: patient        Subjective Evaluation    History of Present Illness  Mechanism of injury: Patient reports that she first experienced vertigo 3 years ago  Patient was treated with Joseph Alvarenga and her symptoms resolved within the first several days  Approximately a week ago during the night she sat up to go to the bathroom and was unable to focus, stating "things were a little wavy " Patient felt unsteady on her feet and would experience symptoms when rolling over in bed  Patient's symptoms resolved for several days until one night she woke up again and felt severe dizziness and nausea  Patient took 15 minutes to get to the bathroom and felt weak  Patient had been throwing up  Patient was unable to lay down and slept upright that night  The following morning her symptoms were gone and she felt weak all day  Patient has not felt symptoms for 4 days now  Patient is now fearful of reproducing her symptoms and avoids certain movements  Patient has skipped her exercise class  Patient has been able to drive without symptoms  Patient denies experiencing dysphagia, dysarthria, lightheadedness or diplopia  Pain  No pain reported    Social Support    Employment status: not working    Diagnostic Tests  No diagnostic tests performed  Treatments  No previous or current treatments  Patient Goals  Patient goal: resolve dizziness        Objective     Concurrent Complaints  Positive for nausea/motion sickness  Negative for headaches, tinnitus, visual change, memory loss, aural fullness and poor concentration    Active Range of Motion   Cervical/Thoracic Spine       Cervical    Flexion: Neck active flexion: WNL  Extension: Neck active extension: WNL  Left lateral flexion: Neck active lateral bend left: WNL  Right lateral flexion: Neck active lateral bend right: WNL  Left rotation: Neck active rotation left: WNL  Right rotation: Neck active rotation right: WNL  Ambulation     Ambulation: Level Surfaces   Ambulation without assistive device: independent    Observational Gait   Gait: within functional limits   Neuro Exam:     Dizziness  Positive for vertigo and light-headedness  Negative for disequilibrium, oscillopsia, motion sickness, rocking or swaying, diplopia and floating or swimming  Exacerbating factors  Positive for bending over, rolling in bed and supine to/from sitting  Negative for looking up, walking, turning head and walking in busy environment       Headaches   Patient reports headaches: No      Cervical exam   Modified VBI   Left: asymptomatic  Right: asymptomatic  Seated posture: forward head posture    Oculomotor exam   Oculomotor ROM: WNL  Resting nystagmus: not present   Smooth pursuits: within normal limits  Vertical saccades: normal  Horizontal saccades: normal  Convergence: normal  Head thrust: left normal and right normal    Positional testing   Galo-Hallpike   Left posterior canal: WNL  Right posterior canal: WNL  Roll test   Left horizontal canal: WNL  Right horizontal canal: WNL      Balance assessments   MCTSIB   Eyes open level surface: WNL  Eyes open foam surface: WNL  Eyes closed level surface: WNL  Eyes closed foam surface:  WNL      Flowsheet Rows      Most Recent Value   PT/OT G-Codes   Current Score  58   Projected Score  83             Precautions: standard      Manual  8/7            Assessment GR                                                                    Exercise Diary  8/7                                                                                                                                                                                                                                                                                    Modalities  8/7

## 2019-08-07 NOTE — PROGRESS NOTES
PT Evaluation     Today's date: 2019  Patient name: Aubrie Raza  : 1940  MRN: [id number]  Referring provider: Joel Moreau DO  Dx:   Encounter Diagnosis     ICD-10-CM    1  Vertigo R42    2  Dizziness and giddiness R42        Start Time: 845  Stop Time: 945  Total time in clinic (min): 60 minutes    Assessment  Assessment details: Aubrie Raza is a 67 y o  female who presents with c/o dizziness, lightheadedness and nausea/vomiting  Patient's symptoms acutely reproduced with bed mobility, including supine to sit and rolling  Upon clinical examination today, Patient presents with normal examination  DGI score 24/24, normal Rhomberg, normal saccades and VOR as well as normal positional testing  No reproduction of symptoms at this time  Patient's history indicates possible BPPV, however, this seems to have spontaneously resolved  Patient will be placed on hold at this time and has been instructed to contact PT if she has any questions or reproduction of symptoms  Understanding of Dx/Px/POC: excellent  Goals  Short Term Goals: to be achieved in 2 weeks  1) Patient to be independent with basic HEP  2) Patient to report 50% in dizziness  Long Term Goals: to be achieved by discharge  1) FOTO equal to or greater than 83   2) Patient to be independent with comprehensive HEP  3) Patient to report resolution of dizziness  Plan  Patient would benefit from: skilled physical therapy  Planned therapy interventions: abdominal trunk stabilization, activity modification, balance/weight bearing training, behavior modification, body mechanics training, breathing training, canalith repositioning, flexibility, functional ROM exercises, home exercise program, therapeutic exercise, therapeutic activities, stretching, strengthening, postural training, patient education, neuromuscular re-education, massage, manual therapy and joint mobilization  Frequency: 1-3x week    Treatment plan discussed with: patient        Subjective Evaluation    History of Present Illness  Mechanism of injury: Patient reports that she first experienced vertigo 3 years ago  Patient was treated with Shantanu Block and her symptoms resolved within the first several days  Approximately a week ago during the night she sat up to go to the bathroom and was unable to focus, stating "things were a little wavy " Patient felt unsteady on her feet and would experience symptoms when rolling over in bed  Patient's symptoms resolved for several days until one night she woke up again and felt severe dizziness and nausea  Patient took 15 minutes to get to the bathroom and felt weak  Patient had been throwing up  Patient was unable to lay down and slept upright that night  The following morning her symptoms were gone and she felt weak all day  Patient has not felt symptoms for 4 days now  Patient is now fearful of reproducing her symptoms and avoids certain movements  Patient has skipped her exercise class  Patient has been able to drive without symptoms  Patient denies experiencing dysphagia, dysarthria, lightheadedness or diplopia  Pain  No pain reported    Social Support    Employment status: not working    Diagnostic Tests  No diagnostic tests performed  Treatments  No previous or current treatments  Patient Goals  Patient goal: resolve dizziness        Objective     Concurrent Complaints  Positive for nausea/motion sickness  Negative for headaches, tinnitus, visual change, memory loss, aural fullness and poor concentration    Active Range of Motion   Cervical/Thoracic Spine       Cervical    Flexion: Neck active flexion: WNL  Extension: Neck active extension: WNL  Left lateral flexion: Neck active lateral bend left: WNL  Right lateral flexion: Neck active lateral bend right: WNL  Left rotation: Neck active rotation left: WNL  Right rotation: Neck active rotation right: WNL               Ambulation     Ambulation: Level Surfaces Ambulation without assistive device: independent    Observational Gait   Gait: within functional limits   Neuro Exam:     Dizziness  Positive for vertigo and light-headedness  Negative for disequilibrium, oscillopsia, motion sickness, rocking or swaying, diplopia and floating or swimming  Exacerbating factors  Positive for bending over, rolling in bed and supine to/from sitting  Negative for looking up, walking, turning head and walking in busy environment  Headaches   Patient reports headaches: No      Cervical exam   Modified VBI   Left: asymptomatic  Right: asymptomatic  Seated posture: forward head posture    Oculomotor exam   Oculomotor ROM: WNL  Resting nystagmus: not present   Smooth pursuits: within normal limits  Vertical saccades: normal  Horizontal saccades: normal  Convergence: normal  Head thrust: left normal and right normal    Positional testing   Squire-Hallpike   Left posterior canal: WNL  Right posterior canal: WNL  Roll test   Left horizontal canal: WNL  Right horizontal canal: WNL      Balance assessments   MCTSIB   Eyes open level surface: WNL  Eyes open foam surface: WNL  Eyes closed level surface: WNL  Eyes closed foam surface:  WNL      Flowsheet Rows      Most Recent Value   PT/OT G-Codes   Current Score  58   Projected Score  83             Precautions: standard      Manual  8/7            Assessment GR                                                                    Exercise Diary  8/7                                                                                                                                                                                                                                                                                    Modalities  8/7

## 2019-08-14 NOTE — PROGRESS NOTES
Vianey Reasons has attended a total of 1 physical therapy appointments to date  Patient was last treated on 8/7/19 and has cancelled all remaining appointments scheduled  Goals, objective and subjective information unable to be updated at this time  Patient is completely symptom free and has not experienced any dizziness  Patient has been instructed to contact PT if she begins to notice a decline in symptoms or if she has any questions or concerns  Skilled physical therapy is not warranted at this time and Patient will be discharged

## 2019-11-25 ENCOUNTER — TRANSCRIBE ORDERS (OUTPATIENT)
Dept: ADMINISTRATIVE | Facility: HOSPITAL | Age: 79
End: 2019-11-25

## 2019-11-25 DIAGNOSIS — G45.3 AMAUROSIS FUGAX: Primary | ICD-10-CM

## 2019-12-10 ENCOUNTER — HOSPITAL ENCOUNTER (OUTPATIENT)
Dept: NON INVASIVE DIAGNOSTICS | Facility: CLINIC | Age: 79
Discharge: HOME | End: 2019-12-10
Payer: MEDICARE

## 2019-12-10 DIAGNOSIS — G45.3 AMAUROSIS FUGAX: ICD-10-CM

## 2019-12-10 PROCEDURE — 93880 EXTRACRANIAL BILAT STUDY: CPT | Performed by: SURGERY

## 2019-12-10 PROCEDURE — 93880 EXTRACRANIAL BILAT STUDY: CPT

## 2020-06-25 DIAGNOSIS — Z11.59 SPECIAL SCREENING EXAMINATION FOR UNSPECIFIED VIRAL DISEASE: Primary | ICD-10-CM

## 2020-06-25 PROCEDURE — U0003 INFECTIOUS AGENT DETECTION BY NUCLEIC ACID (DNA OR RNA); SEVERE ACUTE RESPIRATORY SYNDROME CORONAVIRUS 2 (SARS-COV-2) (CORONAVIRUS DISEASE [COVID-19]), AMPLIFIED PROBE TECHNIQUE, MAKING USE OF HIGH THROUGHPUT TECHNOLOGIES AS DESCRIBED BY CMS-2020-01-R: HCPCS

## 2020-06-27 ENCOUNTER — TELEPHONE (OUTPATIENT)
Dept: UROLOGY | Facility: CLINIC | Age: 80
End: 2020-06-27

## 2020-06-27 ENCOUNTER — APPOINTMENT (EMERGENCY)
Dept: CT IMAGING | Facility: HOSPITAL | Age: 80
DRG: 871 | End: 2020-06-27
Payer: MEDICARE

## 2020-06-27 ENCOUNTER — APPOINTMENT (EMERGENCY)
Dept: RADIOLOGY | Facility: HOSPITAL | Age: 80
DRG: 871 | End: 2020-06-27
Payer: MEDICARE

## 2020-06-27 ENCOUNTER — HOSPITAL ENCOUNTER (INPATIENT)
Facility: HOSPITAL | Age: 80
LOS: 4 days | Discharge: HOME | DRG: 871 | End: 2020-07-01
Attending: EMERGENCY MEDICINE | Admitting: INTERNAL MEDICINE
Payer: MEDICARE

## 2020-06-27 DIAGNOSIS — N15.1 RENAL ABSCESS, LEFT: ICD-10-CM

## 2020-06-27 DIAGNOSIS — K52.9 GASTROENTERITIS: ICD-10-CM

## 2020-06-27 DIAGNOSIS — N12 PYELONEPHRITIS: Primary | ICD-10-CM

## 2020-06-27 DIAGNOSIS — N39.0 UTI (URINARY TRACT INFECTION): ICD-10-CM

## 2020-06-27 DIAGNOSIS — N12 PYELONEPHRITIS: ICD-10-CM

## 2020-06-27 DIAGNOSIS — I95.9 HYPOTENSION: ICD-10-CM

## 2020-06-27 DIAGNOSIS — J18.9 PNEUMONIA: Primary | ICD-10-CM

## 2020-06-27 PROBLEM — R53.1 GENERALIZED WEAKNESS: Status: ACTIVE | Noted: 2020-06-27

## 2020-06-27 PROBLEM — R19.7 DIARRHEA: Status: ACTIVE | Noted: 2020-06-27

## 2020-06-27 LAB
ALBUMIN SERPL BCP-MCNC: 3 G/DL (ref 3.5–5)
ALP SERPL-CCNC: 62 U/L (ref 46–116)
ALT SERPL W P-5'-P-CCNC: 21 U/L (ref 12–78)
ANION GAP SERPL CALCULATED.3IONS-SCNC: 7 MMOL/L (ref 4–13)
APTT PPP: 34 SECONDS (ref 23–37)
AST SERPL W P-5'-P-CCNC: 36 U/L (ref 5–45)
BACTERIA UR QL AUTO: ABNORMAL /HPF
BASE EX.OXY STD BLDV CALC-SCNC: 90.2 % (ref 60–80)
BASE EXCESS BLDV CALC-SCNC: -0.8 MMOL/L
BASOPHILS # BLD AUTO: 0.01 THOUSANDS/ΜL (ref 0–0.1)
BASOPHILS NFR BLD AUTO: 0 % (ref 0–1)
BILIRUB SERPL-MCNC: 0.85 MG/DL (ref 0.2–1)
BILIRUB UR QL STRIP: NEGATIVE
BUN SERPL-MCNC: 16 MG/DL (ref 5–25)
CALCIUM SERPL-MCNC: 8.7 MG/DL (ref 8.3–10.1)
CHLORIDE SERPL-SCNC: 104 MMOL/L (ref 100–108)
CLARITY UR: ABNORMAL
CO2 SERPL-SCNC: 26 MMOL/L (ref 21–32)
COLOR UR: YELLOW
CREAT SERPL-MCNC: 0.79 MG/DL (ref 0.6–1.3)
CRP SERPL HS-MCNC: >90 MG/L
D DIMER PPP FEU-MCNC: 1.81 UG/ML FEU
EOSINOPHIL # BLD AUTO: 0.02 THOUSAND/ΜL (ref 0–0.61)
EOSINOPHIL NFR BLD AUTO: 0 % (ref 0–6)
ERYTHROCYTE [DISTWIDTH] IN BLOOD BY AUTOMATED COUNT: 13 % (ref 11.6–15.1)
GFR SERPL CREATININE-BSD FRML MDRD: 71 ML/MIN/1.73SQ M
GLUCOSE SERPL-MCNC: 133 MG/DL (ref 65–140)
GLUCOSE SERPL-MCNC: 147 MG/DL (ref 65–140)
GLUCOSE UR STRIP-MCNC: NEGATIVE MG/DL
HCO3 BLDV-SCNC: 22.4 MMOL/L (ref 24–30)
HCT VFR BLD AUTO: 38.8 % (ref 34.8–46.1)
HGB BLD-MCNC: 12.4 G/DL (ref 11.5–15.4)
HGB UR QL STRIP.AUTO: ABNORMAL
IMM GRANULOCYTES # BLD AUTO: 0.03 THOUSAND/UL (ref 0–0.2)
IMM GRANULOCYTES NFR BLD AUTO: 1 % (ref 0–2)
INR PPP: 1.21 (ref 0.84–1.19)
KETONES UR STRIP-MCNC: NEGATIVE MG/DL
L PNEUMO1 AG UR QL IA.RAPID: NEGATIVE
LACTATE SERPL-SCNC: 1.1 MMOL/L (ref 0.5–2)
LEUKOCYTE ESTERASE UR QL STRIP: ABNORMAL
LYMPHOCYTES # BLD AUTO: 0.38 THOUSANDS/ΜL (ref 0.6–4.47)
LYMPHOCYTES NFR BLD AUTO: 7 % (ref 14–44)
MCH RBC QN AUTO: 30.2 PG (ref 26.8–34.3)
MCHC RBC AUTO-ENTMCNC: 32 G/DL (ref 31.4–37.4)
MCV RBC AUTO: 95 FL (ref 82–98)
MONOCYTES # BLD AUTO: 0.09 THOUSAND/ΜL (ref 0.17–1.22)
MONOCYTES NFR BLD AUTO: 2 % (ref 4–12)
NEUTROPHILS # BLD AUTO: 4.96 THOUSANDS/ΜL (ref 1.85–7.62)
NEUTS SEG NFR BLD AUTO: 90 % (ref 43–75)
NITRITE UR QL STRIP: POSITIVE
NON-SQ EPI CELLS URNS QL MICRO: ABNORMAL /HPF
NRBC BLD AUTO-RTO: 0 /100 WBCS
NT-PROBNP SERPL-MCNC: 1162 PG/ML
O2 CT BLDV-SCNC: 16.6 ML/DL
PCO2 BLDV: 32.7 MM HG (ref 42–50)
PH BLDV: 7.45 [PH] (ref 7.3–7.4)
PH UR STRIP.AUTO: 6 [PH]
PLATELET # BLD AUTO: 126 THOUSANDS/UL (ref 149–390)
PMV BLD AUTO: 11.2 FL (ref 8.9–12.7)
PO2 BLDV: 62.1 MM HG (ref 35–45)
POTASSIUM SERPL-SCNC: 3.8 MMOL/L (ref 3.5–5.3)
PROT SERPL-MCNC: 6.5 G/DL (ref 6.4–8.2)
PROT UR STRIP-MCNC: ABNORMAL MG/DL
PROTHROMBIN TIME: 14.7 SECONDS (ref 11.6–14.5)
RBC # BLD AUTO: 4.1 MILLION/UL (ref 3.81–5.12)
RBC #/AREA URNS AUTO: ABNORMAL /HPF
S PNEUM AG UR QL: NEGATIVE
SARS-COV-2 RNA RESP QL NAA+PROBE: NEGATIVE
SARS-COV-2 RNA SPEC QL NAA+PROBE: NOT DETECTED
SODIUM SERPL-SCNC: 137 MMOL/L (ref 136–145)
SP GR UR STRIP.AUTO: 1.02 (ref 1–1.03)
TROPONIN I SERPL-MCNC: <0.02 NG/ML
TSH SERPL DL<=0.05 MIU/L-ACNC: 1.95 UIU/ML (ref 0.36–3.74)
UROBILINOGEN UR QL STRIP.AUTO: 0.2 E.U./DL
WBC # BLD AUTO: 5.49 THOUSAND/UL (ref 4.31–10.16)
WBC #/AREA URNS AUTO: ABNORMAL /HPF

## 2020-06-27 PROCEDURE — 74177 CT ABD & PELVIS W/CONTRAST: CPT

## 2020-06-27 PROCEDURE — 71275 CT ANGIOGRAPHY CHEST: CPT

## 2020-06-27 PROCEDURE — 96375 TX/PRO/DX INJ NEW DRUG ADDON: CPT

## 2020-06-27 PROCEDURE — 99285 EMERGENCY DEPT VISIT HI MDM: CPT

## 2020-06-27 PROCEDURE — 87505 NFCT AGENT DETECTION GI: CPT | Performed by: NURSE PRACTITIONER

## 2020-06-27 PROCEDURE — 85025 COMPLETE CBC W/AUTO DIFF WBC: CPT | Performed by: EMERGENCY MEDICINE

## 2020-06-27 PROCEDURE — 83605 ASSAY OF LACTIC ACID: CPT | Performed by: EMERGENCY MEDICINE

## 2020-06-27 PROCEDURE — 87493 C DIFF AMPLIFIED PROBE: CPT | Performed by: NURSE PRACTITIONER

## 2020-06-27 PROCEDURE — 87086 URINE CULTURE/COLONY COUNT: CPT | Performed by: EMERGENCY MEDICINE

## 2020-06-27 PROCEDURE — 96367 TX/PROPH/DG ADDL SEQ IV INF: CPT

## 2020-06-27 PROCEDURE — 99223 1ST HOSP IP/OBS HIGH 75: CPT | Performed by: INTERNAL MEDICINE

## 2020-06-27 PROCEDURE — 85379 FIBRIN DEGRADATION QUANT: CPT | Performed by: EMERGENCY MEDICINE

## 2020-06-27 PROCEDURE — 93005 ELECTROCARDIOGRAM TRACING: CPT

## 2020-06-27 PROCEDURE — 81001 URINALYSIS AUTO W/SCOPE: CPT | Performed by: EMERGENCY MEDICINE

## 2020-06-27 PROCEDURE — 96361 HYDRATE IV INFUSION ADD-ON: CPT

## 2020-06-27 PROCEDURE — 96366 THER/PROPH/DIAG IV INF ADDON: CPT

## 2020-06-27 PROCEDURE — 82805 BLOOD GASES W/O2 SATURATION: CPT | Performed by: EMERGENCY MEDICINE

## 2020-06-27 PROCEDURE — 85610 PROTHROMBIN TIME: CPT | Performed by: EMERGENCY MEDICINE

## 2020-06-27 PROCEDURE — 84484 ASSAY OF TROPONIN QUANT: CPT | Performed by: EMERGENCY MEDICINE

## 2020-06-27 PROCEDURE — 99291 CRITICAL CARE FIRST HOUR: CPT | Performed by: EMERGENCY MEDICINE

## 2020-06-27 PROCEDURE — 87040 BLOOD CULTURE FOR BACTERIA: CPT | Performed by: EMERGENCY MEDICINE

## 2020-06-27 PROCEDURE — 87186 SC STD MICRODIL/AGAR DIL: CPT | Performed by: EMERGENCY MEDICINE

## 2020-06-27 PROCEDURE — 71045 X-RAY EXAM CHEST 1 VIEW: CPT

## 2020-06-27 PROCEDURE — 87077 CULTURE AEROBIC IDENTIFY: CPT | Performed by: EMERGENCY MEDICINE

## 2020-06-27 PROCEDURE — 94664 DEMO&/EVAL PT USE INHALER: CPT

## 2020-06-27 PROCEDURE — 80053 COMPREHEN METABOLIC PANEL: CPT | Performed by: EMERGENCY MEDICINE

## 2020-06-27 PROCEDURE — 83880 ASSAY OF NATRIURETIC PEPTIDE: CPT | Performed by: EMERGENCY MEDICINE

## 2020-06-27 PROCEDURE — 84443 ASSAY THYROID STIM HORMONE: CPT | Performed by: EMERGENCY MEDICINE

## 2020-06-27 PROCEDURE — 1124F ACP DISCUSS-NO DSCNMKR DOCD: CPT | Performed by: PHYSICIAN ASSISTANT

## 2020-06-27 PROCEDURE — 86141 C-REACTIVE PROTEIN HS: CPT | Performed by: EMERGENCY MEDICINE

## 2020-06-27 PROCEDURE — 96365 THER/PROPH/DIAG IV INF INIT: CPT

## 2020-06-27 PROCEDURE — 87449 NOS EACH ORGANISM AG IA: CPT | Performed by: NURSE PRACTITIONER

## 2020-06-27 PROCEDURE — 99222 1ST HOSP IP/OBS MODERATE 55: CPT | Performed by: UROLOGY

## 2020-06-27 PROCEDURE — 87635 SARS-COV-2 COVID-19 AMP PRB: CPT | Performed by: EMERGENCY MEDICINE

## 2020-06-27 PROCEDURE — 99223 1ST HOSP IP/OBS HIGH 75: CPT | Performed by: HOSPITALIST

## 2020-06-27 PROCEDURE — 85730 THROMBOPLASTIN TIME PARTIAL: CPT | Performed by: EMERGENCY MEDICINE

## 2020-06-27 PROCEDURE — 36415 COLL VENOUS BLD VENIPUNCTURE: CPT | Performed by: EMERGENCY MEDICINE

## 2020-06-27 PROCEDURE — 82948 REAGENT STRIP/BLOOD GLUCOSE: CPT

## 2020-06-27 RX ORDER — ONDANSETRON 2 MG/ML
4 INJECTION INTRAMUSCULAR; INTRAVENOUS ONCE
Status: COMPLETED | OUTPATIENT
Start: 2020-06-27 | End: 2020-06-27

## 2020-06-27 RX ORDER — ALBUMIN, HUMAN INJ 5% 5 %
25 SOLUTION INTRAVENOUS ONCE
Status: DISCONTINUED | OUTPATIENT
Start: 2020-06-27 | End: 2020-06-27

## 2020-06-27 RX ORDER — ACETAMINOPHEN 325 MG/1
650 TABLET ORAL EVERY 6 HOURS PRN
Status: DISCONTINUED | OUTPATIENT
Start: 2020-06-27 | End: 2020-07-01 | Stop reason: HOSPADM

## 2020-06-27 RX ORDER — AZITHROMYCIN 250 MG/1
500 TABLET, FILM COATED ORAL EVERY 24 HOURS
Status: DISCONTINUED | OUTPATIENT
Start: 2020-06-28 | End: 2020-06-27

## 2020-06-27 RX ORDER — ALBUMIN, HUMAN INJ 5% 5 %
25 SOLUTION INTRAVENOUS ONCE
Status: COMPLETED | OUTPATIENT
Start: 2020-06-27 | End: 2020-06-27

## 2020-06-27 RX ORDER — AZITHROMYCIN 250 MG/1
500 TABLET, FILM COATED ORAL ONCE
Status: COMPLETED | OUTPATIENT
Start: 2020-06-27 | End: 2020-06-27

## 2020-06-27 RX ORDER — ALBUTEROL SULFATE 2.5 MG/3ML
2.5 SOLUTION RESPIRATORY (INHALATION) EVERY 6 HOURS PRN
Status: DISCONTINUED | OUTPATIENT
Start: 2020-06-27 | End: 2020-07-01 | Stop reason: HOSPADM

## 2020-06-27 RX ORDER — SODIUM CHLORIDE 9 MG/ML
125 INJECTION, SOLUTION INTRAVENOUS CONTINUOUS
Status: DISCONTINUED | OUTPATIENT
Start: 2020-06-27 | End: 2020-07-01

## 2020-06-27 RX ORDER — ACETAMINOPHEN 325 MG/1
650 TABLET ORAL ONCE
Status: COMPLETED | OUTPATIENT
Start: 2020-06-27 | End: 2020-06-27

## 2020-06-27 RX ORDER — GUAIFENESIN 600 MG
600 TABLET, EXTENDED RELEASE 12 HR ORAL 2 TIMES DAILY
Status: DISCONTINUED | OUTPATIENT
Start: 2020-06-27 | End: 2020-07-01 | Stop reason: HOSPADM

## 2020-06-27 RX ADMIN — ENOXAPARIN SODIUM 40 MG: 40 INJECTION SUBCUTANEOUS at 11:13

## 2020-06-27 RX ADMIN — SODIUM CHLORIDE 250 ML: 0.9 INJECTION, SOLUTION INTRAVENOUS at 03:36

## 2020-06-27 RX ADMIN — AZITHROMYCIN 500 MG: 250 TABLET, FILM COATED ORAL at 02:50

## 2020-06-27 RX ADMIN — ALBUMIN (HUMAN) 25 G: 12.5 INJECTION, SOLUTION INTRAVENOUS at 05:29

## 2020-06-27 RX ADMIN — IOHEXOL 100 ML: 350 INJECTION, SOLUTION INTRAVENOUS at 04:05

## 2020-06-27 RX ADMIN — ACETAMINOPHEN 650 MG: 325 TABLET, FILM COATED ORAL at 23:41

## 2020-06-27 RX ADMIN — SODIUM CHLORIDE 500 ML: 0.9 INJECTION, SOLUTION INTRAVENOUS at 06:10

## 2020-06-27 RX ADMIN — SERTRALINE HYDROCHLORIDE 50 MG: 50 TABLET ORAL at 11:12

## 2020-06-27 RX ADMIN — SODIUM CHLORIDE 125 ML/HR: 0.9 INJECTION, SOLUTION INTRAVENOUS at 20:19

## 2020-06-27 RX ADMIN — SODIUM CHLORIDE 250 ML: 0.9 INJECTION, SOLUTION INTRAVENOUS at 02:20

## 2020-06-27 RX ADMIN — CEFTRIAXONE SODIUM 1000 MG: 10 INJECTION, POWDER, FOR SOLUTION INTRAVENOUS at 02:49

## 2020-06-27 RX ADMIN — ONDANSETRON 4 MG: 2 INJECTION INTRAMUSCULAR; INTRAVENOUS at 02:17

## 2020-06-27 RX ADMIN — SODIUM CHLORIDE 125 ML/HR: 0.9 INJECTION, SOLUTION INTRAVENOUS at 02:41

## 2020-06-27 RX ADMIN — ACETAMINOPHEN 650 MG: 325 TABLET, FILM COATED ORAL at 02:15

## 2020-06-27 NOTE — CONSULTS
UROLOGY CONSULTATION NOTE     Patient Identifiers: Carmen Romero (MRN [id number])  Service Requesting Consultation:  [facility]  Service Providing Consultation:  Urology, Isaac Calloway MD    Date of Service: 6/27/2020  Consults    Reason for Consultation:  Pyelonephritis, concern for phlegmon versus abscess, left    History of Present Illness:     Carmen Romero is a 79 y o  old with a history of feeling confused and unwell, does not remember coming to the hospital, states that her  called and had her brought here  Workup has shown fevers, and imaging concerns for phlegmon versus developing abscess along with pyelonephritis, left hand side    This has been present for roughly a day or so and the patient has noted no flank pain, interestingly, and initially feeling confused, but now feeling much better after resuscitation and antibiotics as associated symptoms  Nothing and nothing are identified as aggravating and alleviating factors, respectively  Previous therapies include intravenous fluids and empiric antibiosis and previous work-up includes imaging and laboratory workup  The patient has not previously seen a urologist for this problem  The patient otherwise denies a history of urologic malignancy or malady  The patient denies a family history of urologic malignancy or malady  Past Medical, Past Surgical History:   History reviewed   No pertinent past medical history :    Past Surgical History:   Procedure Laterality Date    HEMORROIDECTOMY      REPLACEMENT TOTAL KNEE Right 2004    TOTAL HIP ARTHROPLASTY Right 2006   :    Medications, Allergies:     Current Facility-Administered Medications   Medication Dose Route Frequency    acetaminophen (TYLENOL) tablet 650 mg  650 mg Oral Q6H PRN    albuterol inhalation solution 2 5 mg  2 5 mg Nebulization Q6H PRN    [START ON 6/28/2020] cefTRIAXone (ROCEPHIN) 1,000 mg in dextrose 5 % 50 mL IVPB  1,000 mg Intravenous Q24H  enoxaparin (LOVENOX) subcutaneous injection 40 mg  40 mg Subcutaneous Daily    guaiFENesin (MUCINEX) 12 hr tablet 600 mg  600 mg Oral BID    sertraline (ZOLOFT) tablet 50 mg  50 mg Oral Daily    sodium chloride 0 9 % infusion  125 mL/hr Intravenous Continuous       Allergies: Allergies   Allergen Reactions    Benzocaine Other (See Comments)     anesthesia-not sure of specific drug    Nsaids GI Intolerance   :    Social and Family History:   Social History:   Social History     Tobacco Use    Smoking status: Never Smoker    Smokeless tobacco: Never Used   Substance Use Topics    Alcohol use: No    Drug use: No        Social History     Tobacco Use   Smoking Status Never Smoker   Smokeless Tobacco Never Used       Family History:  Family History   Problem Relation Age of Onset    Pancreatic cancer Brother    :     Review of Systems:     General: Fever, chills, or night sweats: positive  Cardiac: Negative for chest pain  Pulmonary: Negative for shortness of breath  Gastrointestinal: Abdominal pain negative  Nausea, vomiting, or diarrhea negative,  Genitourinary: See HPI above  Patient does not have hematuria  All other systems were queried and were negative except as listed above in HPI and here in the ROS  Physical Exam:   BP 92/51 (BP Location: Right arm)   Pulse 65   Temp 98 2 °F (36 8 °C) (Oral)   Resp 18   Ht 5' 7" (1 702 m)   Wt 72 6 kg (160 lb 0 9 oz)   SpO2 98%   BMI 25 07 kg/m² Temp (24hrs), Av 2 °F (37 9 °C), Min:98 2 °F (36 8 °C), Max:102 3 °F (39 1 °C)  current; Temperature: 98 2 °F (36 8 °C)  I/O last 24 hours: In: 1966 [I V :500; IV Piggyback:1050]  Out: -   General: Patient is pleasant and in NAD   Awake and alert, patient appears nontoxic, good historian    Cardiac: Peripheral edema: negative, peripheral pulses are present and indicated a regular rate and rhythm    Pulmonary: Non-labored breathing, speaking in full sentences, no wheezing, no coughing    Abdomen: Soft, non-tender, non-distended  Genitourinary: Negative CVA tenderness, negative suprapubic tenderness  Neurological: Cranial nerves II-XII are intact, no sensory deficits, no neurological deficits    Musculoskeletal: Extremities are warm, ROM is not assessed    Psychiatric: The patient's train of thought is linear and logical, mood and affect are normal    Lymphatic: There is not adenopathy in the abdominal region  Skin:  Intact, skin changes of aging or present    BIRD: none        Labs:     Lab Results   Component Value Date    HGB 12 4 06/27/2020    HCT 38 8 06/27/2020    WBC 5 49 06/27/2020     (L) 06/27/2020   ]    Lab Results   Component Value Date     11/24/2015    K 3 8 06/27/2020     06/27/2020    CO2 26 06/27/2020    BUN 16 06/27/2020    CREATININE 0 79 06/27/2020    CALCIUM 8 7 06/27/2020    GLUCOSE 120 11/24/2015   ]    Imaging:   I personally reviewed the images and report of the following studies, and reviewed them with the patient:    CT Abdomen/Pelvis: Left-sided phlegmonous change, versus developing abscess, versus renal mass (less likely given fevers), this is at the upper pole/mid pole and posterior on the left kidney      ASSESSMENT:     79 y o  old female with  pyelonephritis with phlegmon/developing abscess, feeling much better sense intravenous antibiosis has been administered  Does not appear to require percutaneous drainage currently given improvement in clinical symptoms, certainly does not require open biopsy or drainage or debridement at this time  Would recommend infectious disease consultation with regard to potential need for PICC line placement and intravenous antibiosis  This has become the preferred method of managing renal abscesses to avoid the morbidity of what would likely be a nephrectomy in this woman  PLAN:     Continued care per primary team     No plan for urologic intervention currently    Should the patient's clinical status worsen, would recommend interventional Radiology consultation for drainage of abscess with culture/cytology, and potential catheter placement in this area  Would recommend continuing empiric antibiosis, following up cultures, and would also recommend considering a infectious disease consultation with regard to PICC line placement, and need for long-term, intravenous antibiosis  From a urologic perspective we would repeat her imaging in a number of months to ensure resolution  I will order this follow-up scan  Please re-consult as necessary    The following portions of the patient's history were reviewed and updated as appropriate: allergies, current medications, past family history, past medical history, past social history, past surgical history and problem list     Portions of the above record have been created with voice recognition software  Occasional wrong word or "sound alike" substitution may have occurred due to the inherent limitations of voice recognition software  Read the chart carefully and recognize, using context, where substitution may have occurred  Thank you for allowing me to participate in this patients care  Please do not hesitate to call with any additional questions    Milena Clark MD

## 2020-06-27 NOTE — ASSESSMENT & PLAN NOTE
· Blood pressures ranging in 80s-100s/50s  · Patient is asymptomatic  · Obtain orthostatic blood pressure  · Patient received dose of albumin in ER  · IV hydration  · Monitor closely

## 2020-06-27 NOTE — CONSULTS
Consultation - Infectious Disease   Meenu Miranda 79 y o  female MRN: [id number]  Unit/Bed#: W -01 Encounter: [id number]      IMPRESSION & RECOMMENDATIONS:   Impression/Recommendations: This is a 79 y o  female, otherwise healthy, came to the ER last night with acute onset of fever/chills  She is found to have left-sided pyelonephritis with possible early abscess  1  Left-sided pyelonephritis with possible early abscess development  It is surprising that patient has no flank pain and minimal urinary symptoms  Her only presenting symptoms were fever and chills  Her UA is abnormal, with pyuria  Patient appears to be clinically improved  Fever and chills have resolved  She remains without flank pain  Will continue IV antibiotic for now, pending blood and urine culture results  Patient will need repeat imaging of kidney  If she develops abscess, it will need to be drained  Continue IV ceftriaxone for now  Monitor temperature/WBC  Monitor for development of flank pain  Follow-up on urine culture  2  Hypotension  It is unclear whether this was secondary to septic shock  Patient was never systemically toxic  Regardless, hypotension has resolved  Admission blood cultures have no growth thus far  Antibiotic plan as in above  Monitor temperature/WBC  Monitor hemodynamics  Follow-up on pending blood cultures  3  Abnormal CXR and CT showing bibasilar atelectasis  Patient does not have pneumonia clinically  I suspect atelectasis secondary to left kidney infection/abscess  Monitor for development of respiratory symptoms  4  Diarrhea  Etiology of this is unclear  Doubt C difficile colitis without recent antibiotic use  Follow-up on stool studies  Discussed with patient and  in detail regarding the above plan  Thank you for this consultation  We will follow along with you  HISTORY OF PRESENT ILLNESS:  Reason for Consult:  Pyelonephritis      HPI: Meenu Miranda is a 79 y o  female, otherwise healthy, came into the ER last night with acute onset of fever and chills  On presentation, she was afebrile with normal WBC  SBP was also wall  UA was abnormal   Abdomen/pelvis CT showed possible left pyelonephritis with early abscess  Patient was admitted and started on IV ceftriaxone  We are asked to evaluate the patient  Patient feels a lot better today  Fever and chills have resolved  She denies any flank pain  Patient states she may have increasing urinary frequency but no dysuria  She denies prior history of UTI or pyelonephritis  Patient has loose stool but no angelina diarrhea  REVIEW OF SYSTEMS:  A complete system-based review was done  Except for what is noted in HPI above, ROS of systems is otherwise negative  PAST MEDICAL HISTORY:  History reviewed  No pertinent past medical history  Past Surgical History:   Procedure Laterality Date    HEMORROIDECTOMY      REPLACEMENT TOTAL KNEE Right 2004    TOTAL HIP ARTHROPLASTY Right 2006     Problem list reviewed  FAMILY HISTORY:  Non-contributory    SOCIAL HISTORY:  Social History     Substance and Sexual Activity   Alcohol Use Not Currently     Social History     Substance and Sexual Activity   Drug Use No     Social History     Tobacco Use   Smoking Status Never Smoker   Smokeless Tobacco Never Used       ALLERGIES:  Allergies   Allergen Reactions    Benzocaine Other (See Comments)     anesthesia-not sure of specific drug    Nsaids GI Intolerance       MEDICATIONS:  All current active medications have been reviewed  Patient is currently on IV ceftriaxone      PHYSICAL EXAM:  Vitals:  Temp:  [98 2 °F (36 8 °C)-102 3 °F (39 1 °C)] 98 2 °F (36 8 °C)  HR:  [58-84] 65  Resp:  [16-18] 18  BP: ()/(51-71) 92/51  SpO2:  [94 %-98 %] 98 %  Temp (24hrs), Av 2 °F (37 9 °C), Min:98 2 °F (36 8 °C), Max:102 3 °F (39 1 °C)  Current: Temperature: 98 2 °F (36 8 °C)     Physical Exam:  General:  Well-nourished, well-developed, in no acute distress  Awake, alert and oriented x 3  Eyes:  Conjunctive clear with no hemorrhages or effusions  Oropharynx:  No ulcers, no lesions, pharynx benign, no tonsillitis  Neck:  Supple, no lymphadenopathy, no mass, nontender  Lungs:  Expansion symmetric, no rales, no wheezing, no accessory muscle use  Cardiac:  Regular rate and rhythm, normal S1, normal S2, no murmurs  Abdomen:  Soft, nondistended, non-tender, no HSM  Extremities:  No edema, no erythema, nontender  No ulcers  Skin:  No rashes, no ulcers  Neurological:  Moves all four extremities spontaneously, sensation grossly intact    LABS, IMAGING, & OTHER STUDIES:  Lab Results:  I have personally reviewed pertinent labs  Results from last 7 days   Lab Units 06/27/20  0216   POTASSIUM mmol/L 3 8   CHLORIDE mmol/L 104   CO2 mmol/L 26   BUN mg/dL 16   CREATININE mg/dL 0 79   EGFR ml/min/1 73sq m 71   CALCIUM mg/dL 8 7   AST U/L 36   ALT U/L 21   ALK PHOS U/L 62     Results from last 7 days   Lab Units 06/27/20  0216   WBC Thousand/uL 5 49   HEMOGLOBIN g/dL 12 4   PLATELETS Thousands/uL 126*     Results from last 7 days   Lab Units 06/27/20  0910 06/27/20  0217 06/27/20  0200   BLOOD CULTURE   --  Received in Microbiology Lab  Culture in Progress  Received in Microbiology Lab  Culture in Progress  LEGIONELLA URINARY ANTIGEN  Negative  --   --        Imaging Studies:   I have personally reviewed pertinent imaging study reports and images in PACS  CXR reviewed personally  Atelectasis without consolidations  Abdomen/pelvis CT reviewed personally  Area of hypodensity in left kidney  Bibasilar atelectasis  EKG, Pathology, and Other Studies:   I have personally reviewed pertinent reports

## 2020-06-27 NOTE — ASSESSMENT & PLAN NOTE
· Patient reports fatigue, nausea, vomiting, and fever  · Urinalysis:  Moderate leukocytes, positive nitrites, protein and moderate blood  RBCs 2-4, wbc's 20-30 and moderate bacteria  · Urine culture pending  · CT of abdomen: "There is a somewhat ill-defined heterogeneous area of hypodensity within the posterior aspect of the interpolar region left kidney which measures approximately 3 6 cm  The differential diagnosis includes focal pyelonephritis, early renal abscess formation, and renal neoplasm "  · Continue IV Rocephin  · Urology consult

## 2020-06-27 NOTE — ED PROVIDER NOTES
History  Chief Complaint   Patient presents with    Weakness - Generalized     Patient coming from home complaining of weakness and lethargic  Patient is oriented and is warm to touch  Patient is a 79year old female with worsening fever, cough, N/V/D, generalized weakness since yesterday  No travel  No known ill contacts  No sob  No urinary sx  No raw meat, eggs, fish or recent abx use  No dysgeusia or anosmia  Was last seen in this ED on 9/26/18 for mild carpal tunnel syndrome  [facility] website checked on this patient and no Rx found  History provided by:  Patient and EMS personnel   used: No        Prior to Admission Medications   Prescriptions Last Dose Informant Patient Reported? Taking? UNKNOWN TO PATIENT Not Taking at Unknown time  Yes No   naproxen (NAPROSYN) 375 mg tablet   No No   Sig: Take 1 tablet (375 mg total) by mouth 2 (two) times a day as needed for mild pain   Patient not taking: Reported on 8/7/2019   sertraline (ZOLOFT) 50 mg tablet   Yes Yes   Sig: Take 1 tablet by mouth daily      Facility-Administered Medications: None       History reviewed  No pertinent past medical history  Past Surgical History:   Procedure Laterality Date    HEMORROIDECTOMY      REPLACEMENT TOTAL KNEE Right 2004    TOTAL HIP ARTHROPLASTY Right 2006       Family History   Problem Relation Age of Onset    Pancreatic cancer Brother      I have reviewed and agree with the history as documented  E-Cigarette/Vaping     E-Cigarette/Vaping Substances     Social History     Tobacco Use    Smoking status: Never Smoker    Smokeless tobacco: Never Used   Substance Use Topics    Alcohol use: No    Drug use: No       Review of Systems   Constitutional: Positive for fatigue and fever  Respiratory: Positive for cough  Negative for shortness of breath  Gastrointestinal: Positive for diarrhea, nausea and vomiting  Genitourinary: Negative for difficulty urinating     Neurological: Positive for weakness  All other systems reviewed and are negative  Physical Exam  Physical Exam   Constitutional: She is oriented to person, place, and time  She appears distressed (moderate)  HENT:   Head: Normocephalic and atraumatic  Mucous membranes somewhat moist     Eyes: Pupils are equal, round, and reactive to light  EOM are normal  No scleral icterus  Neck: Normal range of motion  Neck supple  No JVD present  No tracheal deviation present  Cardiovascular: Normal rate, regular rhythm and normal heart sounds  No murmur heard  Pulmonary/Chest: Effort normal  No respiratory distress  She has no wheezes  She has no rales  Breath sounds diminished bilaterally with occasional rhonci  Abdominal: Soft  Bowel sounds are normal  She exhibits no distension  There is no tenderness  There is no guarding  Musculoskeletal: She exhibits no edema or deformity  Neurological: She is oriented to person, place, and time  Tired, weak appearing but answers all questions appropriately  No gross focal deficits  Skin: Skin is warm and dry  No rash noted  Psychiatric: She has a normal mood and affect  Nursing note and vitals reviewed        Vital Signs  ED Triage Vitals [06/27/20 0201]   Temperature Pulse Respirations Blood Pressure SpO2   (!) 102 3 °F (39 1 °C) 84 18 134/71 98 %      Temp Source Heart Rate Source Patient Position - Orthostatic VS BP Location FiO2 (%)   Oral Monitor Lying Right arm --      Pain Score       No Pain           Vitals:    06/27/20 0440 06/27/20 0445 06/27/20 0515 06/27/20 0543   BP: 99/54 96/54 (!) 86/53 (!) 85/52   Pulse: 70 70 66 69   Patient Position - Orthostatic VS: Sitting Lying Lying Lying         Visual Acuity      ED Medications  Medications   sodium chloride 0 9 % infusion (0 mL/hr Intravenous Stopped 6/27/20 0539)   sodium chloride 0 9 % bolus 500 mL (has no administration in time range)   sodium chloride 0 9 % bolus 250 mL (0 mL Intravenous Stopped 6/27/20 0241)   acetaminophen (TYLENOL) tablet 650 mg (650 mg Oral Given 6/27/20 0215)   ondansetron (ZOFRAN) injection 4 mg (4 mg Intravenous Given 6/27/20 0217)   ceftriaxone (ROCEPHIN) 1 g/50 mL in dextrose IVPB (0 mg Intravenous Stopped 6/27/20 0432)   azithromycin (ZITHROMAX) tablet 500 mg (500 mg Oral Given 6/27/20 0250)   sodium chloride 0 9 % bolus 250 mL (0 mL Intravenous Stopped 6/27/20 0432)   iohexol (OMNIPAQUE) 350 MG/ML injection (MULTI-DOSE) 100 mL (100 mL Intravenous Given 6/27/20 0405)   albumin human (FLEXBUMIN) 5 % injection 25 g (25 g Intravenous New Bag 6/27/20 0529)       Diagnostic Studies  Results Reviewed     Procedure Component Value Units Date/Time    High sensitivity CRP [[id number]] Collected:  06/27/20 0216    Lab Status:  Final result Specimen:  Blood from Arm, Right Updated:  06/27/20 0351     CRP, High Sensitivity >90 00 mg/L     Narrative:             HsCRP Level       Relative Risk           <1 0 mg/L          Low           1 0 to 3 0 mg/L    Average           >3 0 mg/L          High        Novel Coronavirus (Covid-19),PCR [facility] [[id number]]  (Normal) Collected:  06/27/20 0223    Lab Status:  Final result Specimen:  Throat Updated:  06/27/20 0332     SARS-CoV-2 Negative    Narrative: The specimen collection materials, transport medium, and/or testing methodology utilized in the production of these test results have been proven to be reliable in a limited validation with an abbreviated program under the Emergency Utilization Authorization provided by the FDA  Testing reported as "Presumptive positive" will be confirmed with secondary testing with a reference laboratory to ensure result accuracy  Clinical caution and judgement should be used with the interpretation of these results with consideration of the clinical impression and other laboratory testing    Testing reported as "Positive" or "Negative" has been proven to be accurate according to standard laboratory validation requirements  All testing is performed with control materials showing appropriate reactivity at standard intervals  TSH, 3rd generation with Free T4 reflex [[id number]]  (Normal) Collected:  06/27/20 0216    Lab Status:  Final result Specimen:  Blood from Arm, Right Updated:  06/27/20 0327     TSH 3RD GENERATON 1 946 uIU/mL     Narrative:       Patients undergoing fluorescein dye angiography may retain small amounts of fluorescein in the body for 48-72 hours post procedure  Samples containing fluorescein can produce falsely depressed TSH values  If the patient had this procedure,a specimen should be resubmitted post fluorescein clearance        NT-BNP PRO [[id number]]  (Abnormal) Collected:  06/27/20 0216    Lab Status:  Final result Specimen:  Blood from Arm, Right Updated:  06/27/20 0327     NT-proBNP 1,162 pg/mL     Fingerstick Glucose (POCT) [[id number]]  (Normal) Collected:  06/27/20 0238    Lab Status:  Final result Updated:  06/27/20 0318     POC Glucose 133 mg/dl     CBC and differential [[id number]]  (Abnormal) Collected:  06/27/20 0216    Lab Status:  Final result Specimen:  Blood from Arm, Right Updated:  06/27/20 0259     WBC 5 49 Thousand/uL      RBC 4 10 Million/uL      Hemoglobin 12 4 g/dL      Hematocrit 38 8 %      MCV 95 fL      MCH 30 2 pg      MCHC 32 0 g/dL      RDW 13 0 %      MPV 11 2 fL      Platelets 895 Thousands/uL      nRBC 0 /100 WBCs      Neutrophils Relative 90 %      Immat GRANS % 1 %      Lymphocytes Relative 7 %      Monocytes Relative 2 %      Eosinophils Relative 0 %      Basophils Relative 0 %      Neutrophils Absolute 4 96 Thousands/µL      Immature Grans Absolute 0 03 Thousand/uL      Lymphocytes Absolute 0 38 Thousands/µL      Monocytes Absolute 0 09 Thousand/µL      Eosinophils Absolute 0 02 Thousand/µL      Basophils Absolute 0 01 Thousands/µL     Lactic acid [[id number]]  (Normal) Collected:  06/27/20 0216    Lab Status:  Final result Specimen:  Blood from Arm, Right Updated:  06/27/20 0258     LACTIC ACID 1 1 mmol/L     Narrative:       Result may be elevated if tourniquet was used during collection      Troponin I [[id number]]  (Normal) Collected:  06/27/20 0216    Lab Status:  Final result Specimen:  Blood from Arm, Right Updated:  06/27/20 0257     Troponin I <0 02 ng/mL     Urine Microscopic [[id number]]  (Abnormal) Collected:  06/27/20 0242    Lab Status:  Final result Specimen:  Urine, Clean Catch Updated:  06/27/20 0257     RBC, UA 2-4 /hpf      WBC, UA 20-30 /hpf      Epithelial Cells Occasional /hpf      Bacteria, UA Moderate /hpf     Comprehensive metabolic panel [[id number]]  (Abnormal) Collected:  06/27/20 0216    Lab Status:  Final result Specimen:  Blood from Arm, Right Updated:  06/27/20 0255     Sodium 137 mmol/L      Potassium 3 8 mmol/L      Chloride 104 mmol/L      CO2 26 mmol/L      ANION GAP 7 mmol/L      BUN 16 mg/dL      Creatinine 0 79 mg/dL      Glucose 147 mg/dL      Calcium 8 7 mg/dL      AST 36 U/L      ALT 21 U/L      Alkaline Phosphatase 62 U/L      Total Protein 6 5 g/dL      Albumin 3 0 g/dL      Total Bilirubin 0 85 mg/dL      eGFR 71 ml/min/1 73sq m     Narrative:       Anu guidelines for Chronic Kidney Disease (CKD):     Stage 1 with normal or high GFR (GFR > 90 mL/min/1 73 square meters)    Stage 2 Mild CKD (GFR = 60-89 mL/min/1 73 square meters)    Stage 3A Moderate CKD (GFR = 45-59 mL/min/1 73 square meters)    Stage 3B Moderate CKD (GFR = 30-44 mL/min/1 73 square meters)    Stage 4 Severe CKD (GFR = 15-29 mL/min/1 73 square meters)    Stage 5 End Stage CKD (GFR <15 mL/min/1 73 square meters)  Note: GFR calculation is accurate only with a steady state creatinine    D-Dimer [[id number]]  (Abnormal) Collected:  06/27/20 0216    Lab Status:  Final result Specimen:  Blood from Arm, Right Updated:  06/27/20 0252     D-Dimer, Quant 1 81 ug/ml FEU     Protime-INR [[id number]]  (Abnormal) Collected:  06/27/20 0216 Lab Status:  Final result Specimen:  Blood from Arm, Right Updated:  06/27/20 0251     Protime 14 7 seconds      INR 1 21    APTT [[id number]]  (Normal) Collected:  06/27/20 0216    Lab Status:  Final result Specimen:  Blood from Arm, Right Updated:  06/27/20 0251     PTT 34 seconds     UA (URINE) with reflex to Scope [[id number]]  (Abnormal) Collected:  06/27/20 0242    Lab Status:  Final result Specimen:  Urine, Clean Catch Updated:  06/27/20 0248     Color, UA Yellow     Clarity, UA Slightly Cloudy     Specific Mcgregor, UA 1 020     pH, UA 6 0     Leukocytes, UA Moderate     Nitrite, UA Positive     Protein, UA 30 (1+) mg/dl      Glucose, UA Negative mg/dl      Ketones, UA Negative mg/dl      Urobilinogen, UA 0 2 E U /dl      Bilirubin, UA Negative     Blood, UA Moderate    Urine culture [[id number]] Collected:  06/27/20 0242    Lab Status: In process Specimen:  Urine, Clean Catch Updated:  06/27/20 0245    Blood gas, venous [[id number]]  (Abnormal) Collected:  06/27/20 0216    Lab Status:  Final result Specimen:  Blood from Arm, Right Updated:  06/27/20 0240     pH, Chris 7 453     pCO2, Chris 32 7 mm Hg      pO2, Chris 62 1 mm Hg      HCO3, Chris 22 4 mmol/L      Base Excess, Chris -0 8 mmol/L      O2 Content, Chris 16 6 ml/dL      O2 HGB, VENOUS 90 2 %     Blood culture #1 [[id number]] Collected:  06/27/20 0217    Lab Status: In process Specimen:  Blood from Arm, Left Updated:  06/27/20 0235    Blood culture #2 [[id number]] Collected:  06/27/20 0200    Lab Status: In process Specimen:  Blood from Arm, Right Updated:  06/27/20 0235                 PE Study with CT Abdomen and Pelvis with contrast   ED Interpretation by Angelita Matt MD (06/27 0522)   FINDINGS:      CHEST      PULMONARY ARTERIAL TREE:  No pulmonary embolus is seen  LUNGS:  Dependent changes are present  Roma Anger is bibasilar atelectasis, most pronounced posteriorly within the inferior lower lobes   Superimposed pneumonia should be excluded clinically  PLEURA:  Unremarkable  HEART/AORTA: Verta Shabbir is atherosclerosis of the thoracic aorta   No pericardial effusion  MEDIASTINUM AND NINI: Verta Shabbir is a small hiatal hernia  CHEST WALL AND LOWER NECK:   Unremarkable  ABDOMEN      LIVER/BILIARY TREE: Verta Shabbir is a 2 cm cyst in the dome of the liver      GALLBLADDER:  No calcified gallstones  No pericholecystic inflammatory change  SPLEEN:  Unremarkable  PANCREAS:  Unremarkable  ADRENAL GLANDS:  Unremarkable  KIDNEYS/URETERS: Verta Shabbir is an approximately 3 6 cm somewhat ill-defined heterogeneous area of hypodensity within the posterior aspect of the interpolar region left kidney   Although this could be related to a focal area of pyelonephritis and early renal    abscess formation, neoplasm cannot be excluded and must be considered   Follow-up is required   Urology consultation and follow-up is recommended  Verta Shabbir is mild infiltration of the fat adjacent to the left kidney   There is mild dilatation of the    proximal left ureter without evidence of hydronephrosis   The right kidney appears unremarkable  STOMACH AND BOWEL: Verta Shabbir is no evidence of bowel obstruction   There is mild colonic diverticulosis without evidence of acute diverticulitis  APPENDIX:  No findings to suggest appendicitis  ABDOMINOPELVIC CAVITY:  As described above   Additionally, there is a small amount of free fluid in the posterior pelvis   There is no evidence of pneumoperitoneum  VESSELS: Verta Shabbir is atherosclerosis  Verta Shabbir is no abdominal aortic aneurysm  PELVIS      REPRODUCTIVE ORGANS:  Evaluation is limited due to metallic streak artifact caused by a right hip replacement   Possible small uterine fibroids  URINARY BLADDER:  Evaluation is limited by metallic streak artifact caused by a right hip replacement   The visualized portion of the urinary bladder appears grossly unremarkable        ABDOMINAL WALL/INGUINAL REGIONS:  Unremarkable  OSSEOUS STRUCTURES:  No acute fracture or destructive osseous lesion   A right hip replacement is present  Altagracia Ellis is moderate to advanced degenerative change of the left hip   There is multilevel degenerative change of the spine   Possible disc bulging    at L4-5 and L5-S1   There is exaggeration of thoracic kyphosis  Impression:        There is a somewhat ill-defined heterogeneous area of hypodensity within the posterior aspect of the interpolar region left kidney which measures approximately 3 6 cm   The differential diagnosis includes focal pyelonephritis, early renal abscess    formation, and renal neoplasm   Follow-up is required   Urology consultation and follow-up is recommended   Please see above discussion  There is bibasilar atelectasis   Superimposed pneumonia should be excluded clinically  No evidence of pulmonary embolism is seen  Other findings as described above, please see discussion              I personally discussed this study with Karen Mckenzie on 6/27/2020 at 5:17 AM                               Workstation performed: [id number]         Final Result by Santino Caicedo MD (06/27 4933)      There is a somewhat ill-defined heterogeneous area of hypodensity within the posterior aspect of the interpolar region left kidney which measures approximately 3 6 cm  The differential diagnosis includes focal pyelonephritis, early renal abscess    formation, and renal neoplasm  Follow-up is required  Urology consultation and follow-up is recommended  Please see above discussion  There is bibasilar atelectasis  Superimposed pneumonia should be excluded clinically  No evidence of pulmonary embolism is seen  Other findings as described above, please see discussion               I personally discussed this study with Karen Mckenzie on 6/27/2020 at 5:17 AM                               Workstation performed: [id number]         XR chest 1 view portable   ED Interpretation by Yusuf Romero MD (06/27 0241)   RLL infiltrate read by me  Procedures  ECG 12 Lead Documentation Only  Date/Time: 6/27/2020 2:53 AM  Performed by: Yusuf Romero MD  Authorized by: Yusuf Romero MD     Indications / Diagnosis:  Hypoxia  ECG reviewed by me, the ED Provider: yes    Patient location:  ED  Previous ECG:     Previous ECG:  Compared to current    Comparison ECG info:  11/24/15    Similarity:  Changes noted (not stephanie now and NSSTT changes now)  Rate:     ECG rate:  76    ECG rate assessment: normal    Rhythm:     Rhythm: sinus rhythm    Ectopy:     Ectopy: none    QRS:     QRS axis:  Normal  Conduction:     Conduction: normal    ST segments:     ST segments:  Non-specific  T waves:     T waves: non-specific    Comments:      I do not agree with computer reading of possible ectopic atrial rhythm  CriticalCare Time  Performed by: Yusuf Romero MD  Authorized by: Yusuf Romero MD     Critical care provider statement:     Critical care time (minutes):  40    Critical care time was exclusive of:  Separately billable procedures and treating other patients    Critical care was necessary to treat or prevent imminent or life-threatening deterioration of the following conditions: hypotension, pyelonephritis      Critical care was time spent personally by me on the following activities:  Obtaining history from patient or surrogate, development of treatment plan with patient or surrogate, evaluation of patient's response to treatment, examination of patient, ordering and performing treatments and interventions, ordering and review of laboratory studies, ordering and review of radiographic studies, re-evaluation of patient's condition and review of old charts    I assumed direction of critical care for this patient from another provider in my specialty: no               ED Course  ED Course as of Jun 27 0604   Sat Jun 27, 2020   0242 IV rocephin and zithromax po ordered after looking at CXR for pneumonia        0251 Pulse ox decreased to 93% at times  0303 Labs, EKG, CXR d/w patient  1878 D/w patient  EXT SARS-COV-2: Negative   0334 BP decreased to more IVFs ordered  0522 CT d/w patient  1387 Repeat BP 86/53 so more IVFs ordered and IV albumin ordered  0530 D/w critical care AP who will see patient in ED        9792 D/w critical care AP who was patient in ED and patient's BP usually runs low as per patient and patient can go to med surg  HEART Risk Score      Most Recent Value   Heart Score Risk Calculator   History  0 Filed at: 06/27/2020 0258   ECG  1 Filed at: 06/27/2020 0258   Age  2 Filed at: 06/27/2020 0258   Risk Factors  0 Filed at: 06/27/2020 0258   Troponin  0 Filed at: 06/27/2020 0258   HEART Score  3 Filed at: 06/27/2020 0258                PERC Rule for PE      Most Recent Value   PERC Rule for PE   Age >=50  1 Filed at: 06/27/2020 0255   HR >=100     O2 Sat on room air < 95%     History of PE or DVT     Recent trauma or surgery     Hemoptysis     Exogenous estrogen     Unilateral leg swelling     PERC Rule for PE Results  1 Filed at: 06/27/2020 0255          Initial Sepsis Screening     Row Name 06/27/20 0302                Is the patient's history suggestive of a new or worsening infection? (!) Yes (Proceed)  -AO        Suspected source of infection  pneumonia  -AO        Are two or more of the following signs & symptoms of infection both present and new to the patient?   No  -AO        Indicate SIRS criteria  Hyperthemia > 38 3C (100 9F)  -AO        If the answer is yes to both questions, suspicion of sepsis is present          If severe sepsis is present AND tissue hypoperfusion perists in the hour after fluid resuscitation or lactate > 4, the patient meets criteria for SEPTIC SHOCK          Are any of the following organ dysfunction criteria present within 6 hours of suspected infection and SIRS criteria that are NOT considered to be chronic conditions?         Organ dysfunction          Date of presentation of severe sepsis          Time of presentation of severe sepsis          Tissue hypoperfusion persists in the hour after crystalloid fluid administration, evidenced, by either:          Was hypotension present within one hour of the conclusion of crystalloid fluid administration?         Date of presentation of septic shock          Time of presentation of septic shock            User Key  (r) = Recorded By, (t) = Taken By, (c) = Cosigned By    [address] Name Provider Koko Lopez MD Physician            Mihceline Perez Criteria for PE      Most Recent Value   Wells' Criteria for PE   Clinical signs and symptoms of DVT  0 Filed at: 06/27/2020 0255   PE is primary diagnosis or equally likely  3 Filed at: 06/27/2020 0255   HR >100  0 Filed at: 06/27/2020 0255   Immobilization at least 3 days or Surgery in the previous 4 weeks  0 Filed at: 06/27/2020 0255   Previous, objectively diagnosed PE or DVT  0 Filed at: 06/27/2020 0255   Hemoptysis  0 Filed at: 06/27/2020 0255   Malignancy with treatment within 6 months or palliative  0 Filed at: 06/27/2020 0255   Pepe' Criteria Total  3 Filed at: 06/27/2020 0255                  MDM  Number of Diagnoses or Management Options  Diagnosis management comments: DDx including but not limited to: sepsis, severe sepsis, UTI, pneumonia, viral illness, COVID 23, gastroenteritis, colitis, PE, PTX, CHF, ACS, MI, thyroid disease; doubt cellulitis, doubt sinusitis; doubt meningitis, or other intracranial process or acute surgical intraabdominal process           Amount and/or Complexity of Data Reviewed  Clinical lab tests: ordered and reviewed  Tests in the radiology section of CPT®: ordered and reviewed  Decide to obtain previous medical records or to obtain history from someone other than the patient: yes  Obtain history from someone other than the patient: yes  Review and summarize past medical records: yes  Discuss the patient with other providers: yes  Independent visualization of images, tracings, or specimens: yes          Disposition  Final diagnoses:   Pneumonia   UTI (urinary tract infection)   Gastroenteritis   Pyelonephritis   Hypotension     Time reflects when diagnosis was documented in both MDM as applicable and the Disposition within this note     Time User Action Codes Description Comment    6/27/2020  2:51 AM Debera Oiler Add [J18 9] Pneumonia     6/27/2020  2:51 AM Debera Oiler Add [N39 0] UTI (urinary tract infection)     6/27/2020  2:53 AM Debera Oiler Add [K52 9] Gastroenteritis     6/27/2020  5:22 AM Debera Oiler Add [N12] Pyelonephritis     6/27/2020  5:57 AM Debera Oiler Add [I95 9] Hypotension       ED Disposition     ED Disposition Condition Date/Time Comment    Admit Fair Sat Jun 27, 2020  6:03 AM Case was discussed with EDWIGE Linder  and the patient's admission status was agreed to be Admission Status: inpatient status to the service of Dr Shakeel Patton   Follow-up Information    None         Patient's Medications   Discharge Prescriptions    No medications on file     No discharge procedures on file      PDMP Review       Value Time User    PDMP Reviewed  Yes 6/27/2020  1:53 AM Cheyenne Anderson MD          ED Provider  Electronically Signed by           Cheyenne Anderson MD  06/27/20 3178

## 2020-06-27 NOTE — PLAN OF CARE
Problem: PAIN - ADULT  Goal: Verbalizes/displays adequate comfort level or baseline comfort level  Description  Interventions:  - Encourage patient to monitor pain and request assistance  - Assess pain using appropriate pain scale  - Administer analgesics based on type and severity of pain and evaluate response  - Implement non-pharmacological measures as appropriate and evaluate response  - Consider cultural and social influences on pain and pain management  - Notify physician/advanced practitioner if interventions unsuccessful or patient reports new pain  Outcome: Progressing     Problem: INFECTION - ADULT  Goal: Absence or prevention of progression during hospitalization  Description  INTERVENTIONS:  - Assess and monitor for signs and symptoms of infection  - Monitor lab/diagnostic results  - Monitor all insertion sites, i e  indwelling lines, tubes, and drains  - Monitor endotracheal if appropriate and nasal secretions for changes in amount and color  - [place] appropriate cooling/warming therapies per order  - Administer medications as ordered  - Instruct and encourage patient and family to use good hand hygiene technique  - Identify and instruct in appropriate isolation precautions for identified infection/condition  Outcome: Progressing  Goal: Absence of fever/infection during neutropenic period  Description  INTERVENTIONS:  - Monitor WBC    Outcome: Progressing     Problem: SAFETY ADULT  Goal: Patient will remain free of falls  Description  INTERVENTIONS:  - Assess patient frequently for physical needs  -  Identify cognitive and physical deficits and behaviors that affect risk of falls    -  [place] fall precautions as indicated by assessment   - Educate patient/family on patient safety including physical limitations  - Instruct patient to call for assistance with activity based on assessment  - Modify environment to reduce risk of injury  - Consider OT/PT consult to assist with strengthening/mobility  Outcome: Progressing  Goal: Maintain or return to baseline ADL function  Description  INTERVENTIONS:  -  Assess patient's ability to carry out ADLs; assess patient's baseline for ADL function and identify physical deficits which impact ability to perform ADLs (bathing, care of mouth/teeth, toileting, grooming, dressing, etc )  - Assess/evaluate cause of self-care deficits   - Assess range of motion  - Assess patient's mobility; develop plan if impaired  - Assess patient's need for assistive devices and provide as appropriate  - Encourage maximum independence but intervene and supervise when necessary  - Involve family in performance of ADLs  - Assess for home care needs following discharge   - Consider OT consult to assist with ADL evaluation and planning for discharge  - Provide patient education as appropriate  Outcome: Progressing  Goal: Maintain or return mobility status to optimal level  Description  INTERVENTIONS:  - Assess patient's baseline mobility status (ambulation, transfers, stairs, etc )    - Identify cognitive and physical deficits and behaviors that affect mobility  - Identify mobility aids required to assist with transfers and/or ambulation (gait belt, sit-to-stand, lift, walker, cane, etc )  - [place] fall precautions as indicated by assessment  - Record patient progress and toleration of activity level on Mobility SBAR; progress patient to next Phase/Stage  - Instruct patient to call for assistance with activity based on assessment  - Consider rehabilitation consult to assist with strengthening/weightbearing, etc   Outcome: Progressing     Problem: DISCHARGE PLANNING  Goal: Discharge to home or other facility with appropriate resources  Description  INTERVENTIONS:  - Identify barriers to discharge w/patient and caregiver  - Arrange for needed discharge resources and transportation as appropriate  - Identify discharge learning needs (meds, wound care, etc )  - Arrange for interpretive services to assist at discharge as needed  - Refer to Case Management Department for coordinating discharge planning if the patient needs post-hospital services based on physician/advanced practitioner order or complex needs related to functional status, cognitive ability, or social support system  Outcome: Progressing     Problem: Knowledge Deficit  Goal: Patient/family/caregiver demonstrates understanding of disease process, treatment plan, medications, and discharge instructions  Description  Complete learning assessment and assess knowledge base    Interventions:  - Provide teaching at level of understanding  - Provide teaching via preferred learning methods  Outcome: Progressing

## 2020-06-27 NOTE — ASSESSMENT & PLAN NOTE
· Patient reports complaints of fever, cough and shortness of breath  · PE study CT abdomen chest: "There is bibasilar atelectasis  No evidence of pulmonary embolism is seen    Treating as CAP  IV antibiotics: IV Rocephin and p o  Zithromax  Respiratory protocol, nebs PRN  Obtain sputum culture and Gram stain, strep and Legionella antigens  Obtain procalcitonin  · Monitor respiratory status  · Patient's current oxygen saturation of 97% on 2 L of supplemental oxygen via nasal cannula

## 2020-06-27 NOTE — ASSESSMENT & PLAN NOTE
· Patient presents to the ER with complaints of generalized weakness and lethargy  Patient reports worsening fever, cough, nausea, vomiting, diarrhea, and generalized weakness since yesterday  · COVID-19 negative  · Suspect in the setting of pyelonephritis and bilateral pneumonia  · Patient did not meet SIRS criteria on admission  Lactic acid: 1 1  Follow up on culture results  Monitor vital signs, laboratory studies  · See plan below  · PT/OT evaluations

## 2020-06-27 NOTE — H&P
[facility]    H&P- Frank Nolandgaurang 1940, 79 y o  female MRN: [id number]    Unit/Bed#: W -01 Encounter: [id number]    Primary Care Provider: Miya Porter DO   Date and time admitted to hospital: 6/27/2020  1:52 AM    * Generalized weakness  Assessment & Plan  · Patient presents to the ER with complaints of generalized weakness and lethargy  Patient reports worsening fever, cough, nausea, vomiting, diarrhea, and generalized weakness since yesterday  · COVID-19 negative  · Suspect in the setting of pyelonephritis and bilateral pneumonia  · Patient did not meet SIRS criteria on admission  Lactic acid: 1 1  Follow up on culture results  Monitor vital signs, laboratory studies  · See plan below  · PT/OT evaluations  Pyelonephritis  Assessment & Plan  · Patient reports fatigue, nausea, vomiting, and fever  · Urinalysis:  Moderate leukocytes, positive nitrites, protein and moderate blood  RBCs 2-4, wbc's 20-30 and moderate bacteria  · Urine culture pending  · CT of abdomen: "There is a somewhat ill-defined heterogeneous area of hypodensity within the posterior aspect of the interpolar region left kidney which measures approximately 3 6 cm  The differential diagnosis includes focal pyelonephritis, early renal abscess formation, and renal neoplasm "  · Continue IV Rocephin  · Urology consult  Bilateral pneumonia  Assessment & Plan  · Patient reports complaints of fever, cough and shortness of breath  · PE study CT abdomen chest: "There is bibasilar atelectasis  No evidence of pulmonary embolism is seen    Treating as CAP  IV antibiotics: IV Rocephin and p o  Zithromax  Respiratory protocol, nebs PRN  Obtain sputum culture and Gram stain, strep and Legionella antigens  Obtain procalcitonin  · Monitor respiratory status  · Patient's current oxygen saturation of 97% on 2 L of supplemental oxygen via nasal cannula      Hypotension  Assessment & Plan  · Blood pressures ranging in 80s-100s/50s  · Patient is asymptomatic  · Obtain orthostatic blood pressure  · Patient received dose of albumin in ER  · IV hydration  · Monitor closely  Diarrhea  Assessment & Plan  · IV hydration  · Obtain C diff sample and stool enteric bacterial panel  · Contact precautions  VTE Prophylaxis: Enoxaparin (Lovenox)  / sequential compression device   Code Status: DNR/DNI  POLST: POLST form is not discussed and not completed at this time  Discussion with family: Patient    Anticipated Length of Stay:  Patient will be admitted on an Inpatient basis with an anticipated length of stay of  > 2 midnights  Justification for Hospital Stay: IV antibiotics    Total Time for Visit, including Counseling / Coordination of Care: 1 hour  Greater than 50% of this total time spent on direct patient counseling and coordination of care  Chief Complaint:   Generalized weakness    History of Present Illness:    Chema Andres is a 79 y o  female who presents with generalized weakness, fever and chills  She reports a T-max of 80° F at home  Patient reports that starting on Wednesday she experienced nausea and vomiting  Additionally, she states she has had intermittent loose stools for the past 4 days as well  She reports occasional cough  She reports urinary frequency  She denies chest pain, flank pain, abdominal pain, shortness of breath, or dysuria  Upon evaluation in the ER, patient was found to be febrile with a T-max 102 3° F  Additionally, patient was hypotensive with blood pressures in 80s-90s/50s  Patient was found to be positive urinary tract infection and CT scan indicated possible pyelonephritis  Additionally, CT scan indicated possible bilateral pneumonia  Patient will be admitted for IV antibiotics and blood pressure monitoring  Review of Systems:    Review of Systems   Constitutional: Positive for activity change, chills, fatigue and fever  Respiratory: Positive for cough   Negative for shortness of breath  Cardiovascular: Negative for chest pain  Gastrointestinal: Positive for diarrhea, nausea and vomiting  Negative for abdominal pain  Genitourinary: Positive for frequency  Negative for dysuria  Musculoskeletal: Negative for arthralgias and gait problem  Neurological: Negative for dizziness and light-headedness  All other systems reviewed and are negative  Past Medical and Surgical History:     History reviewed  No pertinent past medical history  Past Surgical History:   Procedure Laterality Date    HEMORROIDECTOMY      REPLACEMENT TOTAL KNEE Right 2004    TOTAL HIP ARTHROPLASTY Right 2006       Meds/Allergies:    Prior to Admission medications    Medication Sig Start Date End Date Taking? Authorizing Provider   sertraline (ZOLOFT) 50 mg tablet Take 1 tablet by mouth daily 2/21/18  Yes Historical Provider, MD   naproxen (NAPROSYN) 375 mg tablet Take 1 tablet (375 mg total) by mouth 2 (two) times a day as needed for mild pain  Patient not taking: Reported on 8/7/2019 9/26/18   Britni Arellano PA-C   UNKNOWN TO PATIENT     Historical Provider, MD     I have reviewed home medications with patient personally  Allergies:    Allergies   Allergen Reactions    Benzocaine Other (See Comments)     anesthesia-not sure of specific drug    Nsaids GI Intolerance       Social History:     Marital Status: /Civil Union   Occupation: Unknown  Patient Pre-hospital Living Situation: Private residence  Patient Pre-hospital Level of Mobility: Independent  Patient Pre-hospital Diet Restrictions: None  Substance Use History:   Social History     Substance and Sexual Activity   Alcohol Use No     Social History     Tobacco Use   Smoking Status Never Smoker   Smokeless Tobacco Never Used     Social History     Substance and Sexual Activity   Drug Use No       Family History:    non-contributory    Physical Exam:     Vitals:   Blood Pressure: 92/51 (06/27/20 0801)  Pulse: 65 (06/27/20 0801)  Temperature: 98 2 °F (36 8 °C) (06/27/20 0615)  Temp Source: Oral (06/27/20 0615)  Respirations: 18 (06/27/20 0728)  Height: 5' 7" (170 2 cm) (06/27/20 0801)  Weight - Scale: 72 6 kg (160 lb 0 9 oz) (06/27/20 0801)  SpO2: 98 % (06/27/20 0806)    Physical Exam   Constitutional: She is oriented to person, place, and time  She appears well-developed and well-nourished  HENT:   Head: Normocephalic and atraumatic  Eyes: Conjunctivae are normal  No scleral icterus  Neck: Normal range of motion  Cardiovascular: Normal rate, regular rhythm and normal heart sounds  No murmur heard  Pulmonary/Chest: Effort normal  No respiratory distress  She has decreased breath sounds  She has no wheezes  She has no rhonchi  She has no rales  Abdominal: Soft  Bowel sounds are normal  She exhibits no distension  There is no tenderness  There is no CVA tenderness  Musculoskeletal: She exhibits no edema, tenderness or deformity  Neurological: She is alert and oriented to person, place, and time  Skin: Skin is warm and dry  Capillary refill takes 2 to 3 seconds  No erythema  No pallor  Psychiatric: Her speech is normal    Nursing note and vitals reviewed  Additional Data:     Lab Results: I have personally reviewed pertinent reports        Results from last 7 days   Lab Units 06/27/20  0216   WBC Thousand/uL 5 49   HEMOGLOBIN g/dL 12 4   HEMATOCRIT % 38 8   PLATELETS Thousands/uL 126*   NEUTROS PCT % 90*   LYMPHS PCT % 7*   MONOS PCT % 2*   EOS PCT % 0     Results from last 7 days   Lab Units 06/27/20  0216   SODIUM mmol/L 137   POTASSIUM mmol/L 3 8   CHLORIDE mmol/L 104   CO2 mmol/L 26   BUN mg/dL 16   CREATININE mg/dL 0 79   ANION GAP mmol/L 7   CALCIUM mg/dL 8 7   ALBUMIN g/dL 3 0*   TOTAL BILIRUBIN mg/dL 0 85   ALK PHOS U/L 62   ALT U/L 21   AST U/L 36   GLUCOSE RANDOM mg/dL 147*     Results from last 7 days   Lab Units 06/27/20  0216   INR  1 21*     Results from last 7 days   Lab Units 06/27/20  0238   POC GLUCOSE mg/dl 133         Results from last 7 days   Lab Units 06/27/20  0216   LACTIC ACID mmol/L 1 1       Imaging: I have personally reviewed pertinent reports  PE Study with CT Abdomen and Pelvis with contrast   ED Interpretation by Cristiano Reyes MD (06/27 0522)   FINDINGS:      CHEST      PULMONARY ARTERIAL TREE:  No pulmonary embolus is seen  LUNGS:  Dependent changes are present  Santosh Keven is bibasilar atelectasis, most pronounced posteriorly within the inferior lower lobes   Superimposed pneumonia should be excluded clinically  PLEURA:  Unremarkable  HEART/AORTA: Santosh Keven is atherosclerosis of the thoracic aorta   No pericardial effusion  MEDIASTINUM AND NINI: Santosh Keven is a small hiatal hernia  CHEST WALL AND LOWER NECK:   Unremarkable  ABDOMEN      LIVER/BILIARY TREE: Santosh Keven is a 2 cm cyst in the dome of the liver      GALLBLADDER:  No calcified gallstones  No pericholecystic inflammatory change  SPLEEN:  Unremarkable  PANCREAS:  Unremarkable  ADRENAL GLANDS:  Unremarkable  KIDNEYS/URETERS: Santosh Keven is an approximately 3 6 cm somewhat ill-defined heterogeneous area of hypodensity within the posterior aspect of the interpolar region left kidney   Although this could be related to a focal area of pyelonephritis and early renal    abscess formation, neoplasm cannot be excluded and must be considered   Follow-up is required   Urology consultation and follow-up is recommended  Santosh Keven is mild infiltration of the fat adjacent to the left kidney   There is mild dilatation of the    proximal left ureter without evidence of hydronephrosis   The right kidney appears unremarkable  STOMACH AND BOWEL: Santosh Keven is no evidence of bowel obstruction   There is mild colonic diverticulosis without evidence of acute diverticulitis  APPENDIX:  No findings to suggest appendicitis        ABDOMINOPELVIC CAVITY:  As described above   Additionally, there is a small amount of free fluid in the posterior pelvis   There is no evidence of pneumoperitoneum  VESSELS: Bryce Golden is atherosclerosis  Bryce Golden is no abdominal aortic aneurysm  PELVIS      REPRODUCTIVE ORGANS:  Evaluation is limited due to metallic streak artifact caused by a right hip replacement   Possible small uterine fibroids  URINARY BLADDER:  Evaluation is limited by metallic streak artifact caused by a right hip replacement   The visualized portion of the urinary bladder appears grossly unremarkable  ABDOMINAL WALL/INGUINAL REGIONS:  Unremarkable  OSSEOUS STRUCTURES:  No acute fracture or destructive osseous lesion   A right hip replacement is present  Bryce Golden is moderate to advanced degenerative change of the left hip   There is multilevel degenerative change of the spine   Possible disc bulging    at L4-5 and L5-S1   There is exaggeration of thoracic kyphosis  Impression:        There is a somewhat ill-defined heterogeneous area of hypodensity within the posterior aspect of the interpolar region left kidney which measures approximately 3 6 cm   The differential diagnosis includes focal pyelonephritis, early renal abscess    formation, and renal neoplasm   Follow-up is required   Urology consultation and follow-up is recommended   Please see above discussion  There is bibasilar atelectasis   Superimposed pneumonia should be excluded clinically  No evidence of pulmonary embolism is seen  Other findings as described above, please see discussion              I personally discussed this study with [address] [place] [address] on 6/27/2020 at 5:17 AM                               Workstation performed: [id number]         Final Result by Emma Bolanos MD (06/27 4519)      There is a somewhat ill-defined heterogeneous area of hypodensity within the posterior aspect of the interpolar region left kidney which measures approximately 3 6 cm    The differential diagnosis includes focal pyelonephritis, early renal abscess    formation, and renal neoplasm  Follow-up is required  Urology consultation and follow-up is recommended  Please see above discussion  There is bibasilar atelectasis  Superimposed pneumonia should be excluded clinically  No evidence of pulmonary embolism is seen  Other findings as described above, please see discussion  I personally discussed this study with Jenni Shahab on 6/27/2020 at 5:17 AM                               Workstation performed: [id number]         XR chest 1 view portable   ED Interpretation by Cheyenne Anderson MD (06/27 0241)   RLL infiltrate read by me  Final Result by Val Goode MD (06/27 4707)      Mild bibasilar atelectasis  Workstation performed: [id number]             EKG, Pathology, and Other Studies Reviewed on Admission:   · EKG: Ectopic atrial rhythm, heart rate 76  Allscripts / Epic Records Reviewed: Yes     ** Please Note: This note has been constructed using a voice recognition system   **

## 2020-06-28 PROBLEM — J98.11 BILATERAL ATELECTASIS: Status: ACTIVE | Noted: 2020-06-28

## 2020-06-28 LAB
C DIFF TOX GENS STL QL NAA+PROBE: NEGATIVE
CAMPYLOBACTER DNA SPEC NAA+PROBE: NORMAL
PROCALCITONIN SERPL-MCNC: 6.94 NG/ML
SALMONELLA DNA SPEC QL NAA+PROBE: NORMAL
SHIGA TOXIN STX GENE SPEC NAA+PROBE: NORMAL
SHIGELLA DNA SPEC QL NAA+PROBE: NORMAL

## 2020-06-28 PROCEDURE — 84145 PROCALCITONIN (PCT): CPT | Performed by: NURSE PRACTITIONER

## 2020-06-28 PROCEDURE — 99232 SBSQ HOSP IP/OBS MODERATE 35: CPT | Performed by: UROLOGY

## 2020-06-28 PROCEDURE — 99233 SBSQ HOSP IP/OBS HIGH 50: CPT | Performed by: INTERNAL MEDICINE

## 2020-06-28 PROCEDURE — 99232 SBSQ HOSP IP/OBS MODERATE 35: CPT | Performed by: FAMILY MEDICINE

## 2020-06-28 RX ADMIN — ENOXAPARIN SODIUM 40 MG: 40 INJECTION SUBCUTANEOUS at 08:36

## 2020-06-28 RX ADMIN — SERTRALINE HYDROCHLORIDE 50 MG: 50 TABLET ORAL at 08:36

## 2020-06-28 RX ADMIN — SODIUM CHLORIDE 125 ML/HR: 0.9 INJECTION, SOLUTION INTRAVENOUS at 03:50

## 2020-06-28 RX ADMIN — SODIUM CHLORIDE 500 ML: 0.9 INJECTION, SOLUTION INTRAVENOUS at 09:56

## 2020-06-28 RX ADMIN — CEFTRIAXONE 1000 MG: 1 INJECTION, POWDER, FOR SOLUTION INTRAMUSCULAR; INTRAVENOUS at 03:51

## 2020-06-28 RX ADMIN — ACETAMINOPHEN 162.5 MG: 325 TABLET, FILM COATED ORAL at 08:44

## 2020-06-28 RX ADMIN — SODIUM CHLORIDE 125 ML/HR: 0.9 INJECTION, SOLUTION INTRAVENOUS at 19:08

## 2020-06-28 NOTE — ASSESSMENT & PLAN NOTE
· Blood pressures ranging in 80s-100s/50s  · Patient is asymptomatic  Current BP this morning was 91/55  Administered 500 mL bolus of IV normal saline  · Current BP improved to 125/66  · Patient received dose of albumin in ER  · IV hydration  · Monitor closely

## 2020-06-28 NOTE — PLAN OF CARE
Problem: PAIN - ADULT  Goal: Verbalizes/displays adequate comfort level or baseline comfort level  Description  Interventions:  - Encourage patient to monitor pain and request assistance  - Assess pain using appropriate pain scale  - Administer analgesics based on type and severity of pain and evaluate response  - Implement non-pharmacological measures as appropriate and evaluate response  - Consider cultural and social influences on pain and pain management  - Notify physician/advanced practitioner if interventions unsuccessful or patient reports new pain  Outcome: Progressing     Problem: INFECTION - ADULT  Goal: Absence or prevention of progression during hospitalization  Description  INTERVENTIONS:  - Assess and monitor for signs and symptoms of infection  - Monitor lab/diagnostic results  - Monitor all insertion sites, i e  indwelling lines, tubes, and drains  - Monitor endotracheal if appropriate and nasal secretions for changes in amount and color  - [place] appropriate cooling/warming therapies per order  - Administer medications as ordered  - Instruct and encourage patient and family to use good hand hygiene technique  - Identify and instruct in appropriate isolation precautions for identified infection/condition  Outcome: Progressing  Goal: Absence of fever/infection during neutropenic period  Description  INTERVENTIONS:  - Monitor WBC    Outcome: Progressing     Problem: SAFETY ADULT  Goal: Patient will remain free of falls  Description  INTERVENTIONS:  - Assess patient frequently for physical needs  -  Identify cognitive and physical deficits and behaviors that affect risk of falls    -  [place] fall precautions as indicated by assessment   - Educate patient/family on patient safety including physical limitations  - Instruct patient to call for assistance with activity based on assessment  - Modify environment to reduce risk of injury  - Consider OT/PT consult to assist with strengthening/mobility  Outcome: Progressing  Goal: Maintain or return to baseline ADL function  Description  INTERVENTIONS:  -  Assess patient's ability to carry out ADLs; assess patient's baseline for ADL function and identify physical deficits which impact ability to perform ADLs (bathing, care of mouth/teeth, toileting, grooming, dressing, etc )  - Assess/evaluate cause of self-care deficits   - Assess range of motion  - Assess patient's mobility; develop plan if impaired  - Assess patient's need for assistive devices and provide as appropriate  - Encourage maximum independence but intervene and supervise when necessary  - Involve family in performance of ADLs  - Assess for home care needs following discharge   - Consider OT consult to assist with ADL evaluation and planning for discharge  - Provide patient education as appropriate  Outcome: Progressing  Goal: Maintain or return mobility status to optimal level  Description  INTERVENTIONS:  - Assess patient's baseline mobility status (ambulation, transfers, stairs, etc )    - Identify cognitive and physical deficits and behaviors that affect mobility  - Identify mobility aids required to assist with transfers and/or ambulation (gait belt, sit-to-stand, lift, walker, cane, etc )  - [place] fall precautions as indicated by assessment  - Record patient progress and toleration of activity level on Mobility SBAR; progress patient to next Phase/Stage  - Instruct patient to call for assistance with activity based on assessment  - Consider rehabilitation consult to assist with strengthening/weightbearing, etc   Outcome: Progressing     Problem: DISCHARGE PLANNING  Goal: Discharge to home or other facility with appropriate resources  Description  INTERVENTIONS:  - Identify barriers to discharge w/patient and caregiver  - Arrange for needed discharge resources and transportation as appropriate  - Identify discharge learning needs (meds, wound care, etc )  - Arrange for interpretive services to assist at discharge as needed  - Refer to Case Management Department for coordinating discharge planning if the patient needs post-hospital services based on physician/advanced practitioner order or complex needs related to functional status, cognitive ability, or social support system  Outcome: Progressing     Problem: Knowledge Deficit  Goal: Patient/family/caregiver demonstrates understanding of disease process, treatment plan, medications, and discharge instructions  Description  Complete learning assessment and assess knowledge base    Interventions:  - Provide teaching at level of understanding  - Provide teaching via preferred learning methods  Outcome: Progressing

## 2020-06-28 NOTE — ASSESSMENT & PLAN NOTE
Patient with mild bibasilar atelectasis most likely secondary to underlying pyelonephritis  Patient does not have pneumonia clinically although procalcitonin elevated at 6 94  Will continue patient solely on IV Rocephin for pyelonephritis

## 2020-06-28 NOTE — PROGRESS NOTES
Progress Note - Johana Atkins 1940, 79 y o  female MRN: [id number]    Unit/Bed#: W -01 Encounter: [id number]    Primary Care Provider: Chandra Mendez DO   Date and time admitted to hospital: 6/27/2020  1:52 AM        * Generalized weakness  Assessment & Plan  · Patient presents to the ER with complaints of generalized weakness and lethargy  Patient reports worsening fever, cough, nausea, vomiting, diarrhea, and generalized weakness since yesterday  · COVID-19 negative  · Suspect in the setting of pyelonephritis  · Patient did not meet SIRS criteria on admission  Lactic acid: 1 1  Blood and urine cultures growing Gram-negative rods-E coli  PT/OT to evaluate  Pyelonephritis  Assessment & Plan  · Patient reported fatigue, nausea, vomiting, and fever  Currently patient is much improved and feels much better  · Urinalysis:  Moderate leukocytes, positive nitrites, protein and moderate blood  RBCs 2-4, wbc's 20-30 and moderate bacteria  · Urine and blood cultures growing Gram-negative rods- E  Coli  · CT of abdomen: "There is a somewhat ill-defined heterogeneous area of hypodensity within the posterior aspect of the interpolar region left kidney which measures approximately 3 6 cm  The differential diagnosis includes focal pyelonephritis, early renal abscess formation, and renal neoplasm "  · Continue IV Rocephin; appreciate infectious disease recommendations  As per ID, will need to repeat CT of abdomen and pelvis by mid week to rule out renal abscess  · Urology was consulted and appreciate their consult and recommendations  They are recommending repeat CT renal protocol sometime in the future in the outpatient setting  Bilateral atelectasis  Assessment & Plan  Patient with mild bibasilar atelectasis most likely secondary to underlying pyelonephritis  Patient does not have pneumonia clinically although procalcitonin elevated at 6 94    Will continue patient solely on IV Rocephin for pyelonephritis  Hypotension  Assessment & Plan  · Blood pressures ranging in 80s-100s/50s  · Patient is asymptomatic  Current BP this morning was 91/55  Administered 500 mL bolus of IV normal saline  · Current BP improved to 125/66  · Patient received dose of albumin in ER  · IV hydration  · Monitor closely  Diarrhea  Assessment & Plan  C diff by PCR negative  VTE Pharmacologic Prophylaxis:   Pharmacologic: Enoxaparin (Lovenox)  Mechanical VTE Prophylaxis in Place: Yes    Patient Centered Rounds: I have performed bedside rounds with nursing staff today  Discussions with Specialists or Other Care Team Provider:  Yes    Education and Discussions with Family / Patient:  Yes with the patient at the bedside    Time Spent for Care: 15 minutes  More than 50% of total time spent on counseling and coordination of care as described above  Current Length of Stay: 1 day(s)    Current Patient Status: Inpatient   Certification Statement: The patient will continue to require additional inpatient hospital stay due to Pyelonephritis    Discharge Plan: To be determined    Code Status: Level 3 - DNAR and DNI      Subjective:   Patient denies any flank pain, nausea, vomiting, abdominal pain, fevers, chills  Objective:     Vitals:   Temp (24hrs), Av 2 °F (37 3 °C), Min:98 1 °F (36 7 °C), Max:100 2 °F (37 9 °C)    Temp:  [98 1 °F (36 7 °C)-100 2 °F (37 9 °C)] 100 2 °F (37 9 °C)  HR:  [54-72] 72  Resp:  [16] 16  BP: (84-96)/(48-63) 91/55  SpO2:  [89 %-93 %] 89 %  Body mass index is 25 07 kg/m²  Input and Output Summary (last 24 hours):     No intake or output data in the 24 hours ending 20 1318    Physical Exam:     Physical Exam   Constitutional: She is oriented to person, place, and time  She appears well-developed and well-nourished  No distress  HENT:   Head: Normocephalic and atraumatic  Eyes: Pupils are equal, round, and reactive to light   EOM are normal    Cardiovascular: Normal rate, regular rhythm and normal heart sounds  No murmur heard  Pulmonary/Chest: Effort normal and breath sounds normal    Abdominal: Soft  Bowel sounds are normal  She exhibits no distension  There is no tenderness  There is no guarding  Musculoskeletal: She exhibits no edema  Neurological: She is alert and oriented to person, place, and time  Skin: Skin is warm and dry  She is not diaphoretic  Additional Data:     Labs:    Results from last 7 days   Lab Units 06/27/20  0216   WBC Thousand/uL 5 49   HEMOGLOBIN g/dL 12 4   HEMATOCRIT % 38 8   PLATELETS Thousands/uL 126*   NEUTROS PCT % 90*   LYMPHS PCT % 7*   MONOS PCT % 2*   EOS PCT % 0     Results from last 7 days   Lab Units 06/27/20  0216   SODIUM mmol/L 137   POTASSIUM mmol/L 3 8   CHLORIDE mmol/L 104   CO2 mmol/L 26   BUN mg/dL 16   CREATININE mg/dL 0 79   ANION GAP mmol/L 7   CALCIUM mg/dL 8 7   ALBUMIN g/dL 3 0*   TOTAL BILIRUBIN mg/dL 0 85   ALK PHOS U/L 62   ALT U/L 21   AST U/L 36   GLUCOSE RANDOM mg/dL 147*     Results from last 7 days   Lab Units 06/27/20  0216   INR  1 21*     Results from last 7 days   Lab Units 06/27/20  0238   POC GLUCOSE mg/dl 133         Results from last 7 days   Lab Units 06/28/20  0457 06/27/20  0216   LACTIC ACID mmol/L  --  1 1   PROCALCITONIN ng/ml 6 94*  --            * I Have Reviewed All Lab Data Listed Above  * Additional Pertinent Lab Tests Reviewed:  [facility]    Imaging:    Imaging Reports Reviewed Today Include:  None available  Imaging Personally Reviewed by Myself Includes:  None    Recent Cultures (last 7 days):     Results from last 7 days   Lab Units 06/27/20  1256 06/27/20  0910 06/27/20  0242 06/27/20  0217 06/27/20  0200   BLOOD CULTURE   --   --   --  Escherichia coli* Escherichia coli*   GRAM STAIN RESULT   --   --   --  Gram negative rods* Gram negative rods*   URINE CULTURE   --   --  70,000-79,000 cfu/ml Escherichia coli*  --   --    LEGIONELLA URINARY ANTIGEN   --  Negative  --   --   --    C DIFF TOXIN B  Negative  --   --   --   --        Last 24 Hours Medication List:     Current Facility-Administered Medications:  acetaminophen 650 mg Oral Q6H PRN TAMIE Shah    albuterol 2 5 mg Nebulization Q6H PRN Rod Pal MD    cefTRIAXone 1,000 mg Intravenous Q24H TAMIE Shah Last Rate: 1,000 mg (06/28/20 0351)   enoxaparin 40 mg Subcutaneous Daily TAMIE Shah    guaiFENesin 600 mg Oral BID TAMIE Shah    sertraline 50 mg Oral Daily TAMIE Linn    sodium chloride 125 mL/hr Intravenous Continuous TAMIE Shah Last Rate: 125 mL/hr (06/28/20 0350)        Today, Patient Was Seen By: Khadijah Dyer MD    ** Please Note: Dictation voice to text software may have been used in the creation of this document   **

## 2020-06-28 NOTE — PROGRESS NOTES
UROLOGY PROGRESS NOTE   Patient Identifiers: Nirmal Shultz (MRN [id number])  Date of Service: 6/28/2020        Assessment:   29-year-old woman with left-sided pyelonephritis, early abscess versus phlegmon on imaging, surprisingly few clinical symptoms other than fatigue and initial fever  She remains without high fevers, most recent temperature is 100 2 degrees  Some decrease in her platelet count, this was present upon admission  White blood cell count is currently 5 4  No concerning examination findings  Blood cultures and urine culture showing E coli, sensitivities pending    Plan:   Continued care per primary team with intravenous antibiosis  Follow-up blood and urine cultures, and tailor antibiotics accordingly  Would defer to Infectious Disease with regard to the need for a PICC line with long-term intravenous antibiosis for positive blood cultures and severe pyelonephritis with potential developing abscess  Should the patient worsen clinically, can repeat a imaging study, potentially starting with a renal ultrasound to see if there is a fluid component to the abscess, if so and worsening a consultation can be placed for interventional radiology percutaneous drainage of this area    Subjective:     24 HR EVENTS:   no significant events  Patient has  complaints of Only some fatigue, otherwise no fevers or chills subjectively, she is seen sitting at the bedside with her  playing cards         Objective:     VITALS:    Vitals:    06/28/20 1300   BP:    Pulse: 70   Resp:    Temp:    SpO2:        INS & OUTS:    Reviewed pertinent values I's/O's      LABS:  Lab Results   Component Value Date    HGB 12 4 06/27/2020    HCT 38 8 06/27/2020    WBC 5 49 06/27/2020     (L) 06/27/2020   ]    Lab Results   Component Value Date     11/24/2015    K 3 8 06/27/2020     06/27/2020    CO2 26 06/27/2020    BUN 16 06/27/2020    CREATININE 0 79 06/27/2020    CALCIUM 8 7 06/27/2020    GLUCOSE 120 11/24/2015   ]    INPATIENT MEDS:    Current Facility-Administered Medications:     acetaminophen (TYLENOL) tablet 650 mg, 650 mg, Oral, Q6H PRN, TAMIE Alvares, 162 5 mg at 06/28/20 0844    albuterol inhalation solution 2 5 mg, 2 5 mg, Nebulization, Q6H PRN, Norma Marshall MD    cefTRIAXone (ROCEPHIN) 1,000 mg in dextrose 5 % 50 mL IVPB, 1,000 mg, Intravenous, Q24H, Last Rate: 100 mL/hr at 06/28/20 0351, 1,000 mg at 06/28/20 0351 **AND** [DISCONTINUED] azithromycin (ZITHROMAX) tablet 500 mg, 500 mg, Oral, Q24H, TAMIE Linn    enoxaparin (LOVENOX) subcutaneous injection 40 mg, 40 mg, Subcutaneous, Daily, TAMIE Linn, 40 mg at 06/28/20 0836    guaiFENesin (MUCINEX) 12 hr tablet 600 mg, 600 mg, Oral, BID, TAMIE Alvares, Stopped at 06/27/20 1800    sertraline (ZOLOFT) tablet 50 mg, 50 mg, Oral, Daily, TAMIE Linn, 50 mg at 06/28/20 0836    sodium chloride 0 9 % infusion, 125 mL/hr, Intravenous, Continuous, TAMIE Linn, Last Rate: 125 mL/hr at 06/28/20 0350, 125 mL/hr at 06/28/20 0350      Physical Exam:   BP 91/55   Pulse 70   Temp 100 2 °F (37 9 °C)   Resp 16   Ht 5' 7" (1 702 m)   Wt 72 6 kg (160 lb 0 9 oz)   SpO2 92%   BMI 25 07 kg/m²   GEN: alert and oriented x 3    RESP: breathing comfortably with no accessory muscle use    CARDIAC: peripheral pulses present    ABD: soft, non-tender, non-distended   INCISION: None   EXT: no significant peripheral edema   DRAINS: none  NEURO: cranial nerves 2-12 intact, no sensory deficits, no motor deficits and the remainder of the neurological examination is unremarkable   PSYCHIATRIC: normal mood and affect and normal train of thought    GENITOURINARY:no bladder tenderness            BIRD: none        RADIOLOGY:   No new films for my review

## 2020-06-28 NOTE — PROGRESS NOTES
Progress Note - Infectious Disease   Edwardo Archuleta 79 y o  female MRN: [id number]  Unit/Bed#: W -01 Encounter: [id number]      Impression/Recommendations:  1  Septic shock, POA  Source is most likely pyelonephritis with secondary bacteremia  Patient is clinically much improved  Hypotension has resolved  Antibiotic plan as in below  Monitor temperature/WBC  Monitor hemodynamics  2  GNR bacteremia  Source is most likely pyelonephritis  Patient is clinically improved on IV ceftriaxone  Will continue current antibiotic regimen  Continue IV ceftriaxone for now  Follow up on final blood culture results  3  Left-sided pyelonephritis with possible early abscess development  She is clinically improved on IV ceftriaxone  She remains without flank pain  Patient will need repeat imaging in next few days to see whether she developed angelina abscess, which would need drainage  Regardless, patient will most likely need long-term antibiotic, until phlegmon/abscess resolves completely  Hopefully, we can treat with p o  antibiotic  Continue IV ceftriaxone for now  Monitor temperature/WBC  Monitor for development of flank pain  Follow-up on urine culture  We should repeat CT in a few days      4  Abnormal CXR and CT showing bibasilar atelectasis  Patient does not have pneumonia clinically  I suspect atelectasis secondary to left kidney infection/abscess  Monitor for development of respiratory symptoms      5  Diarrhea, most likely secondary to septic shock  Diarrhea is much improved  Stool C diff toxin negative  Monitor for recurrent diarrhea      Discussed with patient in detail regarding the above plan  Discussed with Dr Phillip Jewell from [facility] service  Antibiotics:  Ceftriaxone # 2     Subjective:  Patient feels well  No abdominal flank pain  No urinary symptoms  Temperature stays down  No further chills  She is tolerating antibiotic well    No nausea, vomiting or diarrhea  Objective:  Vitals:  Temp:  [98 1 °F (36 7 °C)-100 2 °F (37 9 °C)] 100 2 °F (37 9 °C)  HR:  [54-72] 72  Resp:  [16] 16  BP: (84-96)/(48-63) 91/55  SpO2:  [89 %-93 %] 89 %  Temp (24hrs), Av 2 °F (37 3 °C), Min:98 1 °F (36 7 °C), Max:100 2 °F (37 9 °C)  Current: Temperature: 100 2 °F (37 9 °C)    Physical Exam:     General: Awake, alert, cooperative, no distress  Neck:  Supple  No mass  No lymphadenopathy  Lungs: Expansion symmetric, no rales, no wheezing, respirations unlabored  Heart:  Regular rate and rhythm, S1 and S2 normal, no murmur  Abdomen: Soft, nondistended, non-tender, bowel sounds active all four quadrants, no masses, no organomegaly  Extremities: No edema  No erythema/warmth  No ulcer  Nontender to palpation  Skin:  No rash  Neuro: Moves all extremities  Invasive Devices     Peripheral Intravenous Line            Peripheral IV 20 Left Forearm 1 day    Peripheral IV 20 Left Forearm 1 day                Labs studies:   I have personally reviewed pertinent labs    Results from last 7 days   Lab Units 20  0216   POTASSIUM mmol/L 3 8   CHLORIDE mmol/L 104   CO2 mmol/L 26   BUN mg/dL 16   CREATININE mg/dL 0 79   EGFR ml/min/1 73sq m 71   CALCIUM mg/dL 8 7   AST U/L 36   ALT U/L 21   ALK PHOS U/L 62     Results from last 7 days   Lab Units 20  0216   WBC Thousand/uL 5 49   HEMOGLOBIN g/dL 12 4   PLATELETS Thousands/uL 126*     Results from last 7 days   Lab Units 20  1256 20  0910 20  0242 20  0217 20  0200   BLOOD CULTURE   --   --   --   --  Gram Negative Thompson Enteric Like*   GRAM STAIN RESULT   --   --   --  Gram negative rods* Gram negative rods*   URINE CULTURE   --   --  70,000-79,000 cfu/ml Gram Negative Thompson Enteric Like*  --   --    LEGIONELLA URINARY ANTIGEN   --  Negative  --   --   --    C DIFF TOXIN B  Negative  --   --   --   --        Imaging Studies:   I have personally reviewed pertinent imaging study reports and images in PACS  EKG, Pathology, and Other Studies:   I have personally reviewed pertinent reports

## 2020-06-28 NOTE — ASSESSMENT & PLAN NOTE
· Patient presents to the ER with complaints of generalized weakness and lethargy  Patient reports worsening fever, cough, nausea, vomiting, diarrhea, and generalized weakness since yesterday  · COVID-19 negative  · Suspect in the setting of pyelonephritis  · Patient did not meet SIRS criteria on admission  Lactic acid: 1 1  Blood and urine cultures growing Gram-negative rods-E coli  PT/OT to evaluate

## 2020-06-28 NOTE — ASSESSMENT & PLAN NOTE
· Patient reported fatigue, nausea, vomiting, and fever  Currently patient is much improved and feels much better  · Urinalysis:  Moderate leukocytes, positive nitrites, protein and moderate blood  RBCs 2-4, wbc's 20-30 and moderate bacteria  · Urine and blood cultures growing Gram-negative rods- E  Coli  · CT of abdomen: "There is a somewhat ill-defined heterogeneous area of hypodensity within the posterior aspect of the interpolar region left kidney which measures approximately 3 6 cm  The differential diagnosis includes focal pyelonephritis, early renal abscess formation, and renal neoplasm "  · Continue IV Rocephin; appreciate infectious disease recommendations  As per ID, will need to repeat CT of abdomen and pelvis by mid week to rule out renal abscess  · Urology was consulted and appreciate their consult and recommendations  They are recommending repeat CT renal protocol sometime in the future in the outpatient setting

## 2020-06-28 NOTE — PLAN OF CARE
Problem: PAIN - ADULT  Goal: Verbalizes/displays adequate comfort level or baseline comfort level  Description  Interventions:  - Encourage patient to monitor pain and request assistance  - Assess pain using appropriate pain scale  - Administer analgesics based on type and severity of pain and evaluate response  - Implement non-pharmacological measures as appropriate and evaluate response  - Consider cultural and social influences on pain and pain management  - Notify physician/advanced practitioner if interventions unsuccessful or patient reports new pain  Outcome: Progressing     Problem: INFECTION - ADULT  Goal: Absence or prevention of progression during hospitalization  Description  INTERVENTIONS:  - Assess and monitor for signs and symptoms of infection  - Monitor lab/diagnostic results  - Monitor all insertion sites, i e  indwelling lines, tubes, and drains  - Monitor endotracheal if appropriate and nasal secretions for changes in amount and color  - [place] appropriate cooling/warming therapies per order  - Administer medications as ordered  - Instruct and encourage patient and family to use good hand hygiene technique  - Identify and instruct in appropriate isolation precautions for identified infection/condition  Outcome: Progressing  Goal: Absence of fever/infection during neutropenic period  Description  INTERVENTIONS:  - Monitor WBC    Outcome: Progressing     Problem: SAFETY ADULT  Goal: Patient will remain free of falls  Description  INTERVENTIONS:  - Assess patient frequently for physical needs  -  Identify cognitive and physical deficits and behaviors that affect risk of falls    -  [place] fall precautions as indicated by assessment   - Educate patient/family on patient safety including physical limitations  - Instruct patient to call for assistance with activity based on assessment  - Modify environment to reduce risk of injury  - Consider OT/PT consult to assist with strengthening/mobility  Outcome: Progressing  Goal: Maintain or return to baseline ADL function  Description  INTERVENTIONS:  -  Assess patient's ability to carry out ADLs; assess patient's baseline for ADL function and identify physical deficits which impact ability to perform ADLs (bathing, care of mouth/teeth, toileting, grooming, dressing, etc )  - Assess/evaluate cause of self-care deficits   - Assess range of motion  - Assess patient's mobility; develop plan if impaired  - Assess patient's need for assistive devices and provide as appropriate  - Encourage maximum independence but intervene and supervise when necessary  - Involve family in performance of ADLs  - Assess for home care needs following discharge   - Consider OT consult to assist with ADL evaluation and planning for discharge  - Provide patient education as appropriate  Outcome: Progressing  Goal: Maintain or return mobility status to optimal level  Description  INTERVENTIONS:  - Assess patient's baseline mobility status (ambulation, transfers, stairs, etc )    - Identify cognitive and physical deficits and behaviors that affect mobility  - Identify mobility aids required to assist with transfers and/or ambulation (gait belt, sit-to-stand, lift, walker, cane, etc )  - [place] fall precautions as indicated by assessment  - Record patient progress and toleration of activity level on Mobility SBAR; progress patient to next Phase/Stage  - Instruct patient to call for assistance with activity based on assessment  - Consider rehabilitation consult to assist with strengthening/weightbearing, etc   Outcome: Progressing     Problem: DISCHARGE PLANNING  Goal: Discharge to home or other facility with appropriate resources  Description  INTERVENTIONS:  - Identify barriers to discharge w/patient and caregiver  - Arrange for needed discharge resources and transportation as appropriate  - Identify discharge learning needs (meds, wound care, etc )  - Arrange for interpretive services to assist at discharge as needed  - Refer to Case Management Department for coordinating discharge planning if the patient needs post-hospital services based on physician/advanced practitioner order or complex needs related to functional status, cognitive ability, or social support system  Outcome: Progressing     Problem: Knowledge Deficit  Goal: Patient/family/caregiver demonstrates understanding of disease process, treatment plan, medications, and discharge instructions  Description  Complete learning assessment and assess knowledge base    Interventions:  - Provide teaching at level of understanding  - Provide teaching via preferred learning methods  Outcome: Progressing

## 2020-06-29 LAB
ANION GAP SERPL CALCULATED.3IONS-SCNC: 4 MMOL/L (ref 4–13)
BACTERIA BLD CULT: ABNORMAL
BACTERIA BLD CULT: ABNORMAL
BACTERIA UR CULT: ABNORMAL
BASOPHILS # BLD AUTO: 0.01 THOUSANDS/ΜL (ref 0–0.1)
BASOPHILS NFR BLD AUTO: 0 % (ref 0–1)
BUN SERPL-MCNC: 11 MG/DL (ref 5–25)
CALCIUM SERPL-MCNC: 8.2 MG/DL (ref 8.3–10.1)
CHLORIDE SERPL-SCNC: 110 MMOL/L (ref 100–108)
CO2 SERPL-SCNC: 26 MMOL/L (ref 21–32)
CREAT SERPL-MCNC: 0.62 MG/DL (ref 0.6–1.3)
EOSINOPHIL # BLD AUTO: 0.14 THOUSAND/ΜL (ref 0–0.61)
EOSINOPHIL NFR BLD AUTO: 4 % (ref 0–6)
ERYTHROCYTE [DISTWIDTH] IN BLOOD BY AUTOMATED COUNT: 13.2 % (ref 11.6–15.1)
GFR SERPL CREATININE-BSD FRML MDRD: 86 ML/MIN/1.73SQ M
GLUCOSE SERPL-MCNC: 85 MG/DL (ref 65–140)
GRAM STN SPEC: ABNORMAL
GRAM STN SPEC: ABNORMAL
HCT VFR BLD AUTO: 33.3 % (ref 34.8–46.1)
HGB BLD-MCNC: 10.4 G/DL (ref 11.5–15.4)
IMM GRANULOCYTES # BLD AUTO: 0.01 THOUSAND/UL (ref 0–0.2)
IMM GRANULOCYTES NFR BLD AUTO: 0 % (ref 0–2)
LYMPHOCYTES # BLD AUTO: 0.76 THOUSANDS/ΜL (ref 0.6–4.47)
LYMPHOCYTES NFR BLD AUTO: 22 % (ref 14–44)
MCH RBC QN AUTO: 30.3 PG (ref 26.8–34.3)
MCHC RBC AUTO-ENTMCNC: 31.2 G/DL (ref 31.4–37.4)
MCV RBC AUTO: 97 FL (ref 82–98)
MONOCYTES # BLD AUTO: 0.44 THOUSAND/ΜL (ref 0.17–1.22)
MONOCYTES NFR BLD AUTO: 13 % (ref 4–12)
NEUTROPHILS # BLD AUTO: 2.12 THOUSANDS/ΜL (ref 1.85–7.62)
NEUTS SEG NFR BLD AUTO: 61 % (ref 43–75)
NRBC BLD AUTO-RTO: 0 /100 WBCS
PLATELET # BLD AUTO: 132 THOUSANDS/UL (ref 149–390)
PMV BLD AUTO: 11.6 FL (ref 8.9–12.7)
POTASSIUM SERPL-SCNC: 3.7 MMOL/L (ref 3.5–5.3)
PROCALCITONIN SERPL-MCNC: 3.59 NG/ML
RBC # BLD AUTO: 3.43 MILLION/UL (ref 3.81–5.12)
SODIUM SERPL-SCNC: 140 MMOL/L (ref 136–145)
WBC # BLD AUTO: 3.48 THOUSAND/UL (ref 4.31–10.16)

## 2020-06-29 PROCEDURE — 99232 SBSQ HOSP IP/OBS MODERATE 35: CPT | Performed by: NURSE PRACTITIONER

## 2020-06-29 PROCEDURE — 80048 BASIC METABOLIC PNL TOTAL CA: CPT | Performed by: FAMILY MEDICINE

## 2020-06-29 PROCEDURE — 99233 SBSQ HOSP IP/OBS HIGH 50: CPT | Performed by: INTERNAL MEDICINE

## 2020-06-29 PROCEDURE — 99232 SBSQ HOSP IP/OBS MODERATE 35: CPT | Performed by: INTERNAL MEDICINE

## 2020-06-29 PROCEDURE — 84145 PROCALCITONIN (PCT): CPT | Performed by: NURSE PRACTITIONER

## 2020-06-29 PROCEDURE — 85025 COMPLETE CBC W/AUTO DIFF WBC: CPT | Performed by: FAMILY MEDICINE

## 2020-06-29 RX ADMIN — GUAIFENESIN 600 MG: 600 TABLET, EXTENDED RELEASE ORAL at 17:25

## 2020-06-29 RX ADMIN — CEFTRIAXONE 1000 MG: 1 INJECTION, POWDER, FOR SOLUTION INTRAMUSCULAR; INTRAVENOUS at 03:27

## 2020-06-29 RX ADMIN — ENOXAPARIN SODIUM 40 MG: 40 INJECTION SUBCUTANEOUS at 08:24

## 2020-06-29 RX ADMIN — SERTRALINE HYDROCHLORIDE 50 MG: 50 TABLET ORAL at 08:23

## 2020-06-29 NOTE — PROGRESS NOTES
Progress Note - Milka Desai 79 y o  female MRN: [id number]    Unit/Bed#: W -01 Encounter: [id number]      Assessment: Milka Desai is a 80 yo female that presented with several days of generalized weakness, nausea, and chills  CT of the abdomen shows there is a somewhat ill-defined heterogeneous area of hypodensity within the posterior aspect of the interpolar region left kidney which measures approximately 3 6 cm  White blood cell count was 5 4 in admission currently 3 48  Blood cultures positive for E coli bacteremia  ID recommendation is continue ceftriaxone and renal ultrasound to evaluate abscess tomorrow  Repeat CT if ultrasound comes back abnormal, this will dictate course of antibiotics  Plan:  Continue medical management  Antibiotic course as recommended by ID  Repeat CT is necessary, if symptoms worsen  Consult IR for percutaneous drainage, if there is a fluid component to the abscess  Follow outpatient    Subjective:   Patient is afebrile, denies any nausea, vomiting, flank pain, or dysuria  Vital signs stable  Tolerating a antibiotic therapy  Objective:     Vitals: Blood pressure 116/69, pulse (!) 53, temperature 98 8 °F (37 1 °C), resp  rate 17, height 5' 7" (1 702 m), weight 73 4 kg (161 lb 13 1 oz), SpO2 96 %  ,Body mass index is 25 34 kg/m²  Intake/Output Summary (Last 24 hours) at 6/29/2020 1603  Last data filed at 6/29/2020 1100  Gross per 24 hour   Intake 480 ml   Output    Net 480 ml       Physical Exam: General appearance: alert and oriented, in no acute distress and cooperative  Lungs: clear to auscultation bilaterally  Heart: regular rate and rhythm, S1, S2 normal, no murmur, click, rub or gallop  Abdomen: soft, non-tender; bowel sounds normal; no masses,  no organomegaly  Neurologic: Alert and oriented X 3, normal strength and tone  Normal symmetric reflexes   Normal coordination and gait     Invasive Devices     Peripheral Intravenous Line Peripheral IV 06/27/20 Left Forearm 2 days    Peripheral IV 06/27/20 Left Forearm 2 days                Lab, Imaging and other studies: I have personally reviewed pertinent reports      VTE Pharmacologic Prophylaxis: Sequential compression device (Venodyne)   VTE Mechanical Prophylaxis: sequential compression device

## 2020-06-29 NOTE — PLAN OF CARE
Problem: PAIN - ADULT  Goal: Verbalizes/displays adequate comfort level or baseline comfort level  Description  Interventions:  - Encourage patient to monitor pain and request assistance  - Assess pain using appropriate pain scale  - Administer analgesics based on type and severity of pain and evaluate response  - Implement non-pharmacological measures as appropriate and evaluate response  - Consider cultural and social influences on pain and pain management  - Notify physician/advanced practitioner if interventions unsuccessful or patient reports new pain  Outcome: Progressing     Problem: INFECTION - ADULT  Goal: Absence or prevention of progression during hospitalization  Description  INTERVENTIONS:  - Assess and monitor for signs and symptoms of infection  - Monitor lab/diagnostic results  - Monitor all insertion sites, i e  indwelling lines, tubes, and drains  - Monitor endotracheal if appropriate and nasal secretions for changes in amount and color  - [place] appropriate cooling/warming therapies per order  - Administer medications as ordered  - Instruct and encourage patient and family to use good hand hygiene technique  - Identify and instruct in appropriate isolation precautions for identified infection/condition  Outcome: Progressing  Goal: Absence of fever/infection during neutropenic period  Description  INTERVENTIONS:  - Monitor WBC    Outcome: Progressing     Problem: SAFETY ADULT  Goal: Patient will remain free of falls  Description  INTERVENTIONS:  - Assess patient frequently for physical needs  -  Identify cognitive and physical deficits and behaviors that affect risk of falls    -  [place] fall precautions as indicated by assessment   - Educate patient/family on patient safety including physical limitations  - Instruct patient to call for assistance with activity based on assessment  - Modify environment to reduce risk of injury  - Consider OT/PT consult to assist with strengthening/mobility  Outcome: Progressing  Goal: Maintain or return to baseline ADL function  Description  INTERVENTIONS:  -  Assess patient's ability to carry out ADLs; assess patient's baseline for ADL function and identify physical deficits which impact ability to perform ADLs (bathing, care of mouth/teeth, toileting, grooming, dressing, etc )  - Assess/evaluate cause of self-care deficits   - Assess range of motion  - Assess patient's mobility; develop plan if impaired  - Assess patient's need for assistive devices and provide as appropriate  - Encourage maximum independence but intervene and supervise when necessary  - Involve family in performance of ADLs  - Assess for home care needs following discharge   - Consider OT consult to assist with ADL evaluation and planning for discharge  - Provide patient education as appropriate  Outcome: Progressing  Goal: Maintain or return mobility status to optimal level  Description  INTERVENTIONS:  - Assess patient's baseline mobility status (ambulation, transfers, stairs, etc )    - Identify cognitive and physical deficits and behaviors that affect mobility  - Identify mobility aids required to assist with transfers and/or ambulation (gait belt, sit-to-stand, lift, walker, cane, etc )  - [place] fall precautions as indicated by assessment  - Record patient progress and toleration of activity level on Mobility SBAR; progress patient to next Phase/Stage  - Instruct patient to call for assistance with activity based on assessment  - Consider rehabilitation consult to assist with strengthening/weightbearing, etc   Outcome: Progressing     Problem: DISCHARGE PLANNING  Goal: Discharge to home or other facility with appropriate resources  Description  INTERVENTIONS:  - Identify barriers to discharge w/patient and caregiver  - Arrange for needed discharge resources and transportation as appropriate  - Identify discharge learning needs (meds, wound care, etc )  - Arrange for interpretive services to assist at discharge as needed  - Refer to Case Management Department for coordinating discharge planning if the patient needs post-hospital services based on physician/advanced practitioner order or complex needs related to functional status, cognitive ability, or social support system  Outcome: Progressing     Problem: Knowledge Deficit  Goal: Patient/family/caregiver demonstrates understanding of disease process, treatment plan, medications, and discharge instructions  Description  Complete learning assessment and assess knowledge base    Interventions:  - Provide teaching at level of understanding  - Provide teaching via preferred learning methods  Outcome: Progressing

## 2020-06-29 NOTE — ASSESSMENT & PLAN NOTE
Patient reported fatigue, nausea, vomiting, and fever  Currently patient is much improved and feels much better  Urinalysis:  Moderate leukocytes, positive nitrites, protein and moderate blood  RBCs 2-4, wbc's 20-30 and moderate bacteria  Urine and blood cultures growing Gram-negative rods- E  Coli  CT of abdomen: "There is a somewhat ill-defined heterogeneous area of hypodensity within the posterior aspect of the interpolar region left kidney which measures approximately 3 6 cm  The differential diagnosis includes focal pyelonephritis, early renal abscess formation, and renal neoplasm "  Continue IV Rocephin; appreciate infectious disease recommendations  As per ID, will need to repeat CT of abdomen and pelvis by mid week to rule out renal abscess  Urology was consulted and appreciate their consult and recommendations  They are recommending repeat CT renal protocol sometime in the future in the outpatient setting    C/w abx as pre ID

## 2020-06-29 NOTE — ASSESSMENT & PLAN NOTE
Patient reports complaints of fever, cough and shortness of breath  PE study CT abdomen chest: "There is bibasilar atelectasis  No evidence of pulmonary embolism is seen  

## 2020-06-29 NOTE — PLAN OF CARE
Problem: PAIN - ADULT  Goal: Verbalizes/displays adequate comfort level or baseline comfort level  Description  Interventions:  - Encourage patient to monitor pain and request assistance  - Assess pain using appropriate pain scale  - Administer analgesics based on type and severity of pain and evaluate response  - Implement non-pharmacological measures as appropriate and evaluate response  - Consider cultural and social influences on pain and pain management  - Notify physician/advanced practitioner if interventions unsuccessful or patient reports new pain  Outcome: Progressing     Problem: INFECTION - ADULT  Goal: Absence or prevention of progression during hospitalization  Description  INTERVENTIONS:  - Assess and monitor for signs and symptoms of infection  - Monitor lab/diagnostic results  - Monitor all insertion sites, i e  indwelling lines, tubes, and drains  - Monitor endotracheal if appropriate and nasal secretions for changes in amount and color  - [place] appropriate cooling/warming therapies per order  - Administer medications as ordered  - Instruct and encourage patient and family to use good hand hygiene technique  - Identify and instruct in appropriate isolation precautions for identified infection/condition  Outcome: Progressing  Goal: Absence of fever/infection during neutropenic period  Description  INTERVENTIONS:  - Monitor WBC    Outcome: Progressing     Problem: SAFETY ADULT  Goal: Patient will remain free of falls  Description  INTERVENTIONS:  - Assess patient frequently for physical needs  -  Identify cognitive and physical deficits and behaviors that affect risk of falls    -  [place] fall precautions as indicated by assessment   - Educate patient/family on patient safety including physical limitations  - Instruct patient to call for assistance with activity based on assessment  - Modify environment to reduce risk of injury  - Consider OT/PT consult to assist with strengthening/mobility  Outcome: Progressing  Goal: Maintain or return to baseline ADL function  Description  INTERVENTIONS:  -  Assess patient's ability to carry out ADLs; assess patient's baseline for ADL function and identify physical deficits which impact ability to perform ADLs (bathing, care of mouth/teeth, toileting, grooming, dressing, etc )  - Assess/evaluate cause of self-care deficits   - Assess range of motion  - Assess patient's mobility; develop plan if impaired  - Assess patient's need for assistive devices and provide as appropriate  - Encourage maximum independence but intervene and supervise when necessary  - Involve family in performance of ADLs  - Assess for home care needs following discharge   - Consider OT consult to assist with ADL evaluation and planning for discharge  - Provide patient education as appropriate  Outcome: Progressing  Goal: Maintain or return mobility status to optimal level  Description  INTERVENTIONS:  - Assess patient's baseline mobility status (ambulation, transfers, stairs, etc )    - Identify cognitive and physical deficits and behaviors that affect mobility  - Identify mobility aids required to assist with transfers and/or ambulation (gait belt, sit-to-stand, lift, walker, cane, etc )  - [place] fall precautions as indicated by assessment  - Record patient progress and toleration of activity level on Mobility SBAR; progress patient to next Phase/Stage  - Instruct patient to call for assistance with activity based on assessment  - Consider rehabilitation consult to assist with strengthening/weightbearing, etc   Outcome: Progressing     Problem: DISCHARGE PLANNING  Goal: Discharge to home or other facility with appropriate resources  Description  INTERVENTIONS:  - Identify barriers to discharge w/patient and caregiver  - Arrange for needed discharge resources and transportation as appropriate  - Identify discharge learning needs (meds, wound care, etc )  - Arrange for interpretive services to assist at discharge as needed  - Refer to Case Management Department for coordinating discharge planning if the patient needs post-hospital services based on physician/advanced practitioner order or complex needs related to functional status, cognitive ability, or social support system  Outcome: Progressing     Problem: Knowledge Deficit  Goal: Patient/family/caregiver demonstrates understanding of disease process, treatment plan, medications, and discharge instructions  Description  Complete learning assessment and assess knowledge base    Interventions:  - Provide teaching at level of understanding  - Provide teaching via preferred learning methods  Outcome: Progressing

## 2020-06-29 NOTE — SOCIAL WORK
LOS 2 days, not a bundle or readmission  CM met with patient to introduce self and explain role, complete CM assessment, and discuss DC planning  Patient resides with her  in a 2 floor home with 0 LAURA  Patient functions independent PTA with no use of DME  Hx [facility]  No Hx STR  No Hx MH or substance use  Patient reports she has a LW and POA, states POA is her  then 1st alternate is her daughter, CM requested copy of documents  Patient is retired  Source of income is social security  PCP is Dr Avelino Loera, patient has prescription coverage, and prefers using [facility]  Patient drives and reports her  will transport at DC  No needs identified at this time  CM will continue to follow for potential DC needs

## 2020-06-29 NOTE — ASSESSMENT & PLAN NOTE
Patient presents to the ER with complaints of generalized weakness and lethargy  Patient reports worsening fever, cough, nausea, vomiting, diarrhea, and generalized weakness since yesterday  COVID-19 negative  Suspect in the setting of pyelonephritis  Patient did not meet SIRS criteria on admission  Lactic acid: 1 1  Blood and urine cultures growing Gram-negative rods-E coli  PT/OT to evaluate  Improving

## 2020-06-29 NOTE — PROGRESS NOTES
Progress Note - Mariann Herrera 1940, 79 y o  female MRN: [id number]    Unit/Bed#: W -01 Encounter: [id number]    Primary Care Provider: Cristian Vazquez DO   Date and time admitted to hospital: 6/27/2020  1:52 AM        Bilateral pneumonia  Assessment & Plan  Patient reports complaints of fever, cough and shortness of breath  PE study CT abdomen chest: "There is bibasilar atelectasis  No evidence of pulmonary embolism is seen        Pyelonephritis  Assessment & Plan  Patient reported fatigue, nausea, vomiting, and fever  Currently patient is much improved and feels much better  Urinalysis:  Moderate leukocytes, positive nitrites, protein and moderate blood  RBCs 2-4, wbc's 20-30 and moderate bacteria  Urine and blood cultures growing Gram-negative rods- E  Coli  CT of abdomen: "There is a somewhat ill-defined heterogeneous area of hypodensity within the posterior aspect of the interpolar region left kidney which measures approximately 3 6 cm  The differential diagnosis includes focal pyelonephritis, early renal abscess formation, and renal neoplasm "  Continue IV Rocephin; appreciate infectious disease recommendations  As per ID, will need to repeat CT of abdomen and pelvis by mid week to rule out renal abscess  Urology was consulted and appreciate their consult and recommendations  They are recommending repeat CT renal protocol sometime in the future in the outpatient setting  C/w abx as pre ID    * Generalized weakness  Assessment & Plan  Patient presents to the ER with complaints of generalized weakness and lethargy  Patient reports worsening fever, cough, nausea, vomiting, diarrhea, and generalized weakness since yesterday  COVID-19 negative  Suspect in the setting of pyelonephritis  Patient did not meet SIRS criteria on admission  Lactic acid: 1 1  Blood and urine cultures growing Gram-negative rods-E coli  PT/OT to evaluate  Improving           VTE Pharmacologic Prophylaxis: Pharmacologic: Heparin  Mechanical VTE Prophylaxis in Place: Yes    Patient Centered Rounds: I have performed bedside rounds with nursing staff today  Discussions with Specialists or Other Care Team Provider:  Discussed with nursing    Education and Discussions with Family / Patient:  Discussed with patient and     Time Spent for Care: 30 minutes  More than 50% of total time spent on counseling and coordination of care as described above  Current Length of Stay: 2 day(s)    Current Patient Status: Inpatient   Certification Statement: The patient will continue to require additional inpatient hospital stay due to IV antibiotics    Discharge Plan:  Tomorrow the day after    Code Status: Level 3 - DNAR and DNI      Subjective:   Patient seen examined  "Feeling great"    Objective:     Vitals:   Temp (24hrs), Av 7 °F (37 1 °C), Min:98 7 °F (37 1 °C), Max:98 8 °F (37 1 °C)    Temp:  [98 7 °F (37 1 °C)-98 8 °F (37 1 °C)] 98 8 °F (37 1 °C)  HR:  [52-59] 53  Resp:  [16-17] 17  BP: (114-120)/(65-82) 116/69  SpO2:  [94 %-96 %] 96 %  Body mass index is 25 34 kg/m²  Input and Output Summary (last 24 hours): Intake/Output Summary (Last 24 hours) at 2020 1843  Last data filed at 2020 1100  Gross per 24 hour   Intake 480 ml   Output    Net 480 ml       Physical Exam:     Physical Exam   Constitutional: She is oriented to person, place, and time  She appears well-developed  No distress  HENT:   Head: Normocephalic  Mouth/Throat: No oropharyngeal exudate  Eyes: Pupils are equal, round, and reactive to light  Right eye exhibits no discharge  Left eye exhibits no discharge  No scleral icterus  Neck: Normal range of motion  No JVD present  No tracheal deviation present  No thyromegaly present  Cardiovascular: Normal rate  Exam reveals no gallop and no friction rub  No murmur heard  Pulmonary/Chest: Effort normal  No stridor  No respiratory distress  She has no wheezes   She has no rales  She exhibits no tenderness  Abdominal: Soft  She exhibits no distension and no mass  There is no tenderness  There is no rebound and no guarding  No hernia  Musculoskeletal: She exhibits no edema, tenderness or deformity  Lymphadenopathy:     She has no cervical adenopathy  Neurological: She is alert and oriented to person, place, and time  No cranial nerve deficit or sensory deficit  She exhibits normal muscle tone  Coordination normal    Skin: Skin is warm  Capillary refill takes less than 2 seconds  No rash noted  She is not diaphoretic  No erythema  There is pallor  Psychiatric: She has a normal mood and affect  Additional Data:     Labs:    Results from last 7 days   Lab Units 06/29/20  0527   WBC Thousand/uL 3 48*   HEMOGLOBIN g/dL 10 4*   HEMATOCRIT % 33 3*   PLATELETS Thousands/uL 132*   NEUTROS PCT % 61   LYMPHS PCT % 22   MONOS PCT % 13*   EOS PCT % 4     Results from last 7 days   Lab Units 06/29/20  0527 06/27/20  0216   SODIUM mmol/L 140 137   POTASSIUM mmol/L 3 7 3 8   CHLORIDE mmol/L 110* 104   CO2 mmol/L 26 26   BUN mg/dL 11 16   CREATININE mg/dL 0 62 0 79   ANION GAP mmol/L 4 7   CALCIUM mg/dL 8 2* 8 7   ALBUMIN g/dL  --  3 0*   TOTAL BILIRUBIN mg/dL  --  0 85   ALK PHOS U/L  --  62   ALT U/L  --  21   AST U/L  --  36   GLUCOSE RANDOM mg/dL 85 147*     Results from last 7 days   Lab Units 06/27/20  0216   INR  1 21*     Results from last 7 days   Lab Units 06/27/20  0238   POC GLUCOSE mg/dl 133         Results from last 7 days   Lab Units 06/29/20  0934 06/28/20  0457 06/27/20  0216   LACTIC ACID mmol/L  --   --  1 1   PROCALCITONIN ng/ml 3 59* 6 94*  --            * I Have Reviewed All Lab Data Listed Above  * Additional Pertinent Lab Tests Reviewed:  An[facility] 67 Admission Reviewed    Imaging:    Imaging Reports Reviewed Today Include:   None pertinent to this encounter  Imaging Personally Reviewed by Myself Includes:  As above    Recent Cultures (last 7 days):     Results from last 7 days   Lab Units 06/27/20  1256 06/27/20  0910 06/27/20  0242 06/27/20  0217 06/27/20  0200   BLOOD CULTURE   --   --   --  Escherichia coli* Escherichia coli*   GRAM STAIN RESULT   --   --   --  Gram negative rods* Gram negative rods*   URINE CULTURE   --   --  70,000-79,000 cfu/ml Escherichia coli*  --   --    LEGIONELLA URINARY ANTIGEN   --  Negative  --   --   --    C DIFF TOXIN B  Negative  --   --   --   --        Last 24 Hours Medication List:     Current Facility-Administered Medications:  acetaminophen 650 mg Oral Q6H PRN Claudette BreakerNETTANP    albuterol 2 5 mg Nebulization Q6H PRN Santo Bamberger, MD    cefTRIAXone 1,000 mg Intravenous Q24H Claudette Breaker, CRNP Last Rate: 1,000 mg (06/29/20 0327)   enoxaparin 40 mg Subcutaneous Daily TAMIE Linn    guaiFENesin 600 mg Oral BID Claudette Breaker, NETTANP    sertraline 50 mg Oral Daily Chhaya William, NETTANP    sodium chloride 125 mL/hr Intravenous Continuous Claudette Breaker, CRNP Last Rate: 125 mL/hr (06/28/20 1908)        Today, Patient Was Seen By: Alize Gruber MD    ** Please Note: Dictation voice to text software may have been used in the creation of this document   **

## 2020-06-29 NOTE — PROGRESS NOTES
Progress Note - Infectious Disease   Ap Ribeiro 79 y o  female MRN: [id number]  Unit/Bed#: W -01 Encounter: [id number]      Impression/Plan:  1   Septic shock, POA  Source is most likely pyelonephritis with secondary bacteremia  Patient is clinically much improved  Hypotension has resolved  Antibiotic plan as in below  Monitor temperature/WBC  Monitor hemodynamics      2  E Coli bacteremia  Source is most likely pyelonephritis  Blood cultures 2 of 2 sets E coli pansensitive  Patient is clinically improved on IV ceftriaxone  Continues IV ceftriaxone at present        3   E coli Left-sided pyelonephritis with possible early abscess development  urine culture with pansensitive E coli  She is clinically improved on IV ceftriaxone  She remains without flank pain  Patient will need repeat imaging to see whether she developed angelina abscess, which would need drainage  Regardless, patient will most likely need long-term antibiotic, until phlegmon/abscess resolves completely  Hopefully, we can treat with p o  antibiotic  Continues IV ceftriaxone today  Monitor temperature/WBC  Monitor for development of flank pain  Recommend checking renal US tomorrow      4  Abnormal CXR and CT showing bibasilar atelectasis   Patient does not have pneumonia clinically   I suspect atelectasis secondary to left kidney infection/abscess  Monitor for development of respiratory symptoms      5  Diarrhea, most likely secondary to septic shock  Diarrhea is much improved  Stool C diff toxin negative  Monitor for recurrent diarrhea      Discussed with patient and primary care team in detail regarding the above plan      Antibiotics:  Ceftriaxone # 3      Subjective:  Patient feels well  No abdominal/flank pain  No urinary symptoms  Temperature stays down  No further chills  She is tolerating antibiotic well  No nausea, vomiting or diarrhea      ROS: Patient has no fever, cough, shortness of breath; she reports chronic low back pain therefore she feels she would not have noticed any difference in back pain versus new flank pain  Objective:  Vitals:  Temp:  [97 8 °F (36 6 °C)-98 7 °F (37 1 °C)] 98 7 °F (37 1 °C)  HR:  [52-70] 59  Resp:  [16-17] 16  BP: (114-120)/(65-82) 120/82  SpO2:  [94 %-96 %] 95 %  Temp (24hrs), Av 5 °F (36 9 °C), Min:97 8 °F (36 6 °C), Max:98 7 °F (37 1 °C)  Current: Temperature: 98 7 °F (37 1 °C)    General Appearance:  Awake, alert, cooperative, resting in chair, no acute distress  Throat: Oropharynx moist without lesions  Lungs:   Clear to auscultation bilaterally; no wheezes, rhonchi or rales; respirations unlabored   Heart:  RRR; S1-S2 heard, no murmur   Abdomen:   Soft, non-tender, non-distended, positive bowel sounds       : No King catheter, no suprapubic pain, no CVA tenderness   Extremities: No edema, arm IV site nontender   Skin: No rashes      Labs, Imaging, & Other studies:   All pertinent labs and imaging studies were personally reviewed  Results from last 7 days   Lab Units 20  0527 20  0216   WBC Thousand/uL 3 48* 5 49   HEMOGLOBIN g/dL 10 4* 12 4   PLATELETS Thousands/uL 132* 126*     Results from last 7 days   Lab Units 20  0527 20  0216   POTASSIUM mmol/L 3 7 3 8   CHLORIDE mmol/L 110* 104   CO2 mmol/L 26 26   BUN mg/dL 11 16   CREATININE mg/dL 0 62 0 79   EGFR ml/min/1 73sq m 86 71   CALCIUM mg/dL 8 2* 8 7   AST U/L  --  36   ALT U/L  --  21   ALK PHOS U/L  --  62     Results from last 7 days   Lab Units 20  0934 20  0457   PROCALCITONIN ng/ml 3 59* 6 94*     Results from last 7 days   Lab Units 20  1256 20  0910 20  0242 06/7 20   BLOOD CULTURE   --   --   --  Escherichia coli* Escherichia coli*   GRAM STAIN RESULT   --   --   --  Gram negative rods* Gram negative rods*   URINE CULTURE   --   --  70,000-79,000 cfu/ml Escherichia coli*  --   --    LEGIONELLA URINARY ANTIGEN   -- Negative  --   --   --    C DIFF TOXIN B  Negative  --   --   --   --

## 2020-06-30 ENCOUNTER — APPOINTMENT (INPATIENT)
Dept: ULTRASOUND IMAGING | Facility: HOSPITAL | Age: 80
DRG: 871 | End: 2020-06-30
Payer: MEDICARE

## 2020-06-30 PROBLEM — B96.20 E COLI BACTEREMIA: Status: ACTIVE | Noted: 2020-06-30

## 2020-06-30 PROBLEM — A41.9 SEPSIS WITHOUT ACUTE ORGAN DYSFUNCTION (HCC): Status: ACTIVE | Noted: 2020-06-27

## 2020-06-30 PROBLEM — R78.81 E COLI BACTEREMIA: Status: ACTIVE | Noted: 2020-06-30

## 2020-06-30 PROBLEM — A41.51 SEPSIS DUE TO ESCHERICHIA COLI WITHOUT ACUTE ORGAN DYSFUNCTION (HCC): Status: ACTIVE | Noted: 2020-06-27

## 2020-06-30 PROBLEM — I95.9 HYPOTENSION: Status: RESOLVED | Noted: 2020-06-27 | Resolved: 2020-06-30

## 2020-06-30 LAB
ANION GAP SERPL CALCULATED.3IONS-SCNC: 6 MMOL/L (ref 4–13)
ATRIAL RATE: 76 BPM
BASOPHILS # BLD AUTO: 0.02 THOUSANDS/ΜL (ref 0–0.1)
BASOPHILS NFR BLD AUTO: 1 % (ref 0–1)
BUN SERPL-MCNC: 10 MG/DL (ref 5–25)
CALCIUM SERPL-MCNC: 7.8 MG/DL (ref 8.3–10.1)
CHLORIDE SERPL-SCNC: 108 MMOL/L (ref 100–108)
CO2 SERPL-SCNC: 27 MMOL/L (ref 21–32)
CREAT SERPL-MCNC: 0.67 MG/DL (ref 0.6–1.3)
EOSINOPHIL # BLD AUTO: 0.16 THOUSAND/ΜL (ref 0–0.61)
EOSINOPHIL NFR BLD AUTO: 4 % (ref 0–6)
ERYTHROCYTE [DISTWIDTH] IN BLOOD BY AUTOMATED COUNT: 12.9 % (ref 11.6–15.1)
GFR SERPL CREATININE-BSD FRML MDRD: 84 ML/MIN/1.73SQ M
GLUCOSE SERPL-MCNC: 90 MG/DL (ref 65–140)
HCT VFR BLD AUTO: 32.4 % (ref 34.8–46.1)
HGB BLD-MCNC: 10.4 G/DL (ref 11.5–15.4)
IMM GRANULOCYTES # BLD AUTO: 0.01 THOUSAND/UL (ref 0–0.2)
IMM GRANULOCYTES NFR BLD AUTO: 0 % (ref 0–2)
LYMPHOCYTES # BLD AUTO: 0.79 THOUSANDS/ΜL (ref 0.6–4.47)
LYMPHOCYTES NFR BLD AUTO: 21 % (ref 14–44)
MCH RBC QN AUTO: 31 PG (ref 26.8–34.3)
MCHC RBC AUTO-ENTMCNC: 32.1 G/DL (ref 31.4–37.4)
MCV RBC AUTO: 97 FL (ref 82–98)
MONOCYTES # BLD AUTO: 0.35 THOUSAND/ΜL (ref 0.17–1.22)
MONOCYTES NFR BLD AUTO: 9 % (ref 4–12)
NEUTROPHILS # BLD AUTO: 2.53 THOUSANDS/ΜL (ref 1.85–7.62)
NEUTS SEG NFR BLD AUTO: 65 % (ref 43–75)
NRBC BLD AUTO-RTO: 0 /100 WBCS
P AXIS: -77 DEGREES
PLATELET # BLD AUTO: 151 THOUSANDS/UL (ref 149–390)
PMV BLD AUTO: 10.9 FL (ref 8.9–12.7)
POTASSIUM SERPL-SCNC: 3.5 MMOL/L (ref 3.5–5.3)
PR INTERVAL: 148 MS
QRS AXIS: 47 DEGREES
QRSD INTERVAL: 82 MS
QT INTERVAL: 362 MS
QTC INTERVAL: 407 MS
RBC # BLD AUTO: 3.35 MILLION/UL (ref 3.81–5.12)
SODIUM SERPL-SCNC: 141 MMOL/L (ref 136–145)
T WAVE AXIS: -14 DEGREES
VENTRICULAR RATE: 76 BPM
WBC # BLD AUTO: 3.86 THOUSAND/UL (ref 4.31–10.16)

## 2020-06-30 PROCEDURE — 99233 SBSQ HOSP IP/OBS HIGH 50: CPT | Performed by: INTERNAL MEDICINE

## 2020-06-30 PROCEDURE — 80048 BASIC METABOLIC PNL TOTAL CA: CPT | Performed by: INTERNAL MEDICINE

## 2020-06-30 PROCEDURE — 99232 SBSQ HOSP IP/OBS MODERATE 35: CPT | Performed by: PHYSICIAN ASSISTANT

## 2020-06-30 PROCEDURE — 85025 COMPLETE CBC W/AUTO DIFF WBC: CPT | Performed by: INTERNAL MEDICINE

## 2020-06-30 PROCEDURE — 93010 ELECTROCARDIOGRAM REPORT: CPT | Performed by: INTERNAL MEDICINE

## 2020-06-30 PROCEDURE — 76770 US EXAM ABDO BACK WALL COMP: CPT

## 2020-06-30 RX ORDER — CEPHALEXIN 500 MG/1
500 CAPSULE ORAL EVERY 6 HOURS SCHEDULED
Status: DISCONTINUED | OUTPATIENT
Start: 2020-06-30 | End: 2020-07-01 | Stop reason: HOSPADM

## 2020-06-30 RX ORDER — CEPHALEXIN 500 MG/1
500 CAPSULE ORAL EVERY 6 HOURS SCHEDULED
Status: DISCONTINUED | OUTPATIENT
Start: 2020-06-30 | End: 2020-06-30

## 2020-06-30 RX ADMIN — CEFTRIAXONE 1000 MG: 1 INJECTION, POWDER, FOR SOLUTION INTRAMUSCULAR; INTRAVENOUS at 03:34

## 2020-06-30 RX ADMIN — ACETAMINOPHEN 325 MG: 325 TABLET, FILM COATED ORAL at 09:25

## 2020-06-30 RX ADMIN — SERTRALINE HYDROCHLORIDE 50 MG: 50 TABLET ORAL at 09:25

## 2020-06-30 RX ADMIN — CEPHALEXIN 500 MG: 500 CAPSULE ORAL at 17:00

## 2020-06-30 RX ADMIN — CEPHALEXIN 500 MG: 500 CAPSULE ORAL at 23:55

## 2020-06-30 RX ADMIN — GUAIFENESIN 600 MG: 600 TABLET, EXTENDED RELEASE ORAL at 09:25

## 2020-06-30 RX ADMIN — ENOXAPARIN SODIUM 40 MG: 40 INJECTION SUBCUTANEOUS at 09:26

## 2020-06-30 RX ADMIN — SODIUM CHLORIDE 125 ML/HR: 0.9 INJECTION, SOLUTION INTRAVENOUS at 16:45

## 2020-06-30 NOTE — ASSESSMENT & PLAN NOTE
· Patient with mild bibasilar atelectasis most likely secondary to underlying pyelonephritis  Patient does not have pneumonia clinically although procalcitonin elevated at 6 94  Clinically her respiratory status is stable   · Will continue patient solely on IV Rocephin for pyelonephritis

## 2020-06-30 NOTE — PROGRESS NOTES
[facility]  Progress Note - Stefani Dawson 1940, 79 y o  female MRN: [id number]    Unit/Bed#: W -01 Encounter: [id number]    Primary Care Provider: Fay Torres DO   Date and time admitted to hospital: 6/27/2020  1:52 AM        * Sepsis due to Escherichia coli without acute organ dysfunction [facility])  Assessment & Plan  · Patient had sepsis present on admission evidenced by fever and hypotension  Source of infection was pyelonephritis/E  Coli bacteremia  There was no organ dysfunction present and hypotension improved with IVF and albumin  COVID-19 negative    · Appreciate on going ID and urology input  · Continue Rocphin  · Follow up renal ultrasound  · Monitor hemodynamics     Pyelonephritis  Assessment & Plan  · Patient had fevers, fatigue, nausea  Urine and blood cultures both positive for E  Coli  CT scan on admission showing possible early consolidation representing localized pyelonephritis versus early abscess  · Continue with IV Rocephin  · Follow up renal ultrasound   · Appreciate ID and Urology input     E coli bacteremia  Assessment & Plan  · Patient has 2/2 blood cultures positive for E  Coli bacteria secondary to pyelonephritis  ID planning to treat for 10 days total pending renal ultrasound   · Continue antibiotics as above   · Follow up renal ultrasound    Diarrhea  Assessment & Plan  · Stool studies, C  Diff negative  · No further work up needed     Bilateral atelectasis  Assessment & Plan  · Patient with mild bibasilar atelectasis most likely secondary to underlying pyelonephritis  Patient does not have pneumonia clinically although procalcitonin elevated at 6 94  Clinically her respiratory status is stable   · Will continue patient solely on IV Rocephin for pyelonephritis          VTE Pharmacologic Prophylaxis:   Pharmacologic: Enoxaparin (Lovenox)  Mechanical VTE Prophylaxis in Place: No    Patient Centered Rounds: I have performed bedside rounds with nursing staff today     Discussions with Specialists or Other Care Team Provider: Discussed with ID, urology, RN, CM    Education and Discussions with Family / Patient: Discussed with patient, declined family update     Time Spent for Care: 30 minutes  More than 50% of total time spent on counseling and coordination of care as described above  Current Length of Stay: 3 day(s)    Current Patient Status: Inpatient   Certification Statement: The patient will continue to require additional inpatient hospital stay due to on going IV antibiotics pending renal ultrasound    Discharge Plan: Pending above     Code Status: Level 3 - DNAR and DNI      Subjective:   Patient reports that she is feeling well today  Denies complaints  Feels much better than admission  Objective:     Vitals:   Temp (24hrs), Av 3 °F (36 8 °C), Min:98 °F (36 7 °C), Max:98 8 °F (37 1 °C)    Temp:  [98 °F (36 7 °C)-98 8 °F (37 1 °C)] 98 °F (36 7 °C)  HR:  [53-76] 61  Resp:  [16-17] 16  BP: (116-123)/(69-75) 122/69  SpO2:  [93 %-96 %] 93 %  Body mass index is 25 97 kg/m²  Input and Output Summary (last 24 hours): Intake/Output Summary (Last 24 hours) at 2020 1119  Last data filed at 2020 0404  Gross per 24 hour   Intake 6937 5 ml   Output    Net 6937 5 ml       Physical Exam:     Physical Exam   Constitutional: She is oriented to person, place, and time  Vital signs are normal  She appears well-developed and well-nourished  Non-toxic appearance  No distress  HENT:   Head: Normocephalic and atraumatic  Mouth/Throat: Mucous membranes are not dry  Eyes: Pupils are equal, round, and reactive to light  Conjunctivae and EOM are normal  No scleral icterus  Pupils are equal    Neck: Neck supple  Cardiovascular: Normal rate, regular rhythm, S1 normal, S2 normal, normal heart sounds and intact distal pulses  Exam reveals no S3 and no S4  No murmur heard    Pulmonary/Chest: Effort normal and breath sounds normal  No accessory muscle usage or stridor  No respiratory distress  She has no decreased breath sounds  She has no wheezes  She has no rhonchi  She has no rales  She exhibits no tenderness  Abdominal: Soft  Bowel sounds are normal  She exhibits no distension and no mass  There is no tenderness  There is no rigidity, no rebound, no guarding and no CVA tenderness  Neurological: She is alert and oriented to person, place, and time  She is not disoriented  She displays no tremor  She displays no seizure activity  Skin: Skin is warm and dry  Additional Data:     Labs:    Results from last 7 days   Lab Units 06/30/20  0510   WBC Thousand/uL 3 86*   HEMOGLOBIN g/dL 10 4*   HEMATOCRIT % 32 4*   PLATELETS Thousands/uL 151   NEUTROS PCT % 65   LYMPHS PCT % 21   MONOS PCT % 9   EOS PCT % 4     Results from last 7 days   Lab Units 06/30/20  0510  06/27/20  0216   POTASSIUM mmol/L 3 5   < > 3 8   CHLORIDE mmol/L 108   < > 104   CO2 mmol/L 27   < > 26   BUN mg/dL 10   < > 16   CREATININE mg/dL 0 67   < > 0 79   CALCIUM mg/dL 7 8*   < > 8 7   ALK PHOS U/L  --   --  62   ALT U/L  --   --  21   AST U/L  --   --  36    < > = values in this interval not displayed  Results from last 7 days   Lab Units 06/27/20  0216   INR  1 21*       * I Have Reviewed All Lab Data Listed Above  * Additional Pertinent Lab Tests Reviewed:  [facility]    Imaging:    Imaging Reports Reviewed Today Include: None  Imaging Personally Reviewed by Myself Includes:  None    Recent Cultures (last 7 days):     Results from last 7 days   Lab Units 06/27/20  1256 06/27/20  0910 06/27/20  0242 06/27/20  0217 06/27/20  0200   BLOOD CULTURE   --   --   --  Escherichia coli* Escherichia coli*   GRAM STAIN RESULT   --   --   --  Gram negative rods* Gram negative rods*   URINE CULTURE   --   --  70,000-79,000 cfu/ml Escherichia coli*  --   --    LEGIONELLA URINARY ANTIGEN   --  Negative  --   --   --    C DIFF TOXIN B  Negative  --   --   -- --        Last 24 Hours Medication List:     Current Facility-Administered Medications:  acetaminophen 650 mg Oral Q6H PRN TAMIE Franco    albuterol 2 5 mg Nebulization Q6H PRN Prasanna Salinas MD    cefTRIAXone 1,000 mg Intravenous Q24H TAMIE Franco Last Rate: Stopped (06/30/20 0404)   enoxaparin 40 mg Subcutaneous Daily TAMIE Linn    guaiFENesin 600 mg Oral BID TAMIE Franco    sertraline 50 mg Oral Daily TAMIE Linn    sodium chloride 125 mL/hr Intravenous Continuous TAMIE Franco Last Rate: 125 mL/hr (06/28/20 1908)        Today, Patient Was Seen By: Ty Toledo PA-C    ** Please Note: Dictation voice to text software may have been used in the creation of this document   **

## 2020-06-30 NOTE — PLAN OF CARE
Problem: PAIN - ADULT  Goal: Verbalizes/displays adequate comfort level or baseline comfort level  Description  Interventions:  - Encourage patient to monitor pain and request assistance  - Assess pain using appropriate pain scale  - Administer analgesics based on type and severity of pain and evaluate response  - Implement non-pharmacological measures as appropriate and evaluate response  - Consider cultural and social influences on pain and pain management  - Notify physician/advanced practitioner if interventions unsuccessful or patient reports new pain  Outcome: Progressing     Problem: INFECTION - ADULT  Goal: Absence or prevention of progression during hospitalization  Description  INTERVENTIONS:  - Assess and monitor for signs and symptoms of infection  - Monitor lab/diagnostic results  - Monitor all insertion sites, i e  indwelling lines, tubes, and drains  - Monitor endotracheal if appropriate and nasal secretions for changes in amount and color  - [place] appropriate cooling/warming therapies per order  - Administer medications as ordered  - Instruct and encourage patient and family to use good hand hygiene technique  - Identify and instruct in appropriate isolation precautions for identified infection/condition  Outcome: Progressing     Problem: SAFETY ADULT  Goal: Patient will remain free of falls  Description  INTERVENTIONS:  - Assess patient frequently for physical needs  -  Identify cognitive and physical deficits and behaviors that affect risk of falls    -  [place] fall precautions as indicated by assessment   - Educate patient/family on patient safety including physical limitations  - Instruct patient to call for assistance with activity based on assessment  - Modify environment to reduce risk of injury  - Consider OT/PT consult to assist with strengthening/mobility  Outcome: Progressing  Goal: Maintain or return to baseline ADL function  Description  INTERVENTIONS:  -  Assess patient's ability to carry out ADLs; assess patient's baseline for ADL function and identify physical deficits which impact ability to perform ADLs (bathing, care of mouth/teeth, toileting, grooming, dressing, etc )  - Assess/evaluate cause of self-care deficits   - Assess range of motion  - Assess patient's mobility; develop plan if impaired  - Assess patient's need for assistive devices and provide as appropriate  - Encourage maximum independence but intervene and supervise when necessary  - Involve family in performance of ADLs  - Assess for home care needs following discharge   - Consider OT consult to assist with ADL evaluation and planning for discharge  - Provide patient education as appropriate  Outcome: Progressing  Goal: Maintain or return mobility status to optimal level  Description  INTERVENTIONS:  - Assess patient's baseline mobility status (ambulation, transfers, stairs, etc )    - Identify cognitive and physical deficits and behaviors that affect mobility  - Identify mobility aids required to assist with transfers and/or ambulation (gait belt, sit-to-stand, lift, walker, cane, etc )  - [place] fall precautions as indicated by assessment  - Record patient progress and toleration of activity level on Mobility SBAR; progress patient to next Phase/Stage  - Instruct patient to call for assistance with activity based on assessment  - Consider rehabilitation consult to assist with strengthening/weightbearing, etc   Outcome: Progressing     Problem: DISCHARGE PLANNING  Goal: Discharge to home or other facility with appropriate resources  Description  INTERVENTIONS:  - Identify barriers to discharge w/patient and caregiver  - Arrange for needed discharge resources and transportation as appropriate  - Identify discharge learning needs (meds, wound care, etc )  - Arrange for interpretive services to assist at discharge as needed  - Refer to Case Management Department for coordinating discharge planning if the patient needs post-hospital services based on physician/advanced practitioner order or complex needs related to functional status, cognitive ability, or social support system  Outcome: Progressing     Problem: Knowledge Deficit  Goal: Patient/family/caregiver demonstrates understanding of disease process, treatment plan, medications, and discharge instructions  Description  Complete learning assessment and assess knowledge base    Interventions:  - Provide teaching at level of understanding  - Provide teaching via preferred learning methods  Outcome: Progressing

## 2020-06-30 NOTE — ASSESSMENT & PLAN NOTE
· Patient has 2/2 blood cultures positive for E  Coli bacteria secondary to pyelonephritis   ID planning to treat for 10 days total pending renal ultrasound   · Continue antibiotics as above   · Follow up renal ultrasound

## 2020-06-30 NOTE — ASSESSMENT & PLAN NOTE
· Patient had fevers, fatigue, nausea  Urine and blood cultures both positive for E  Coli   CT scan on admission showing possible early consolidation representing localized pyelonephritis versus early abscess  · Continue with IV Rocephin  · Follow up renal ultrasound   · Appreciate ID and Urology input

## 2020-06-30 NOTE — PROGRESS NOTES
Progress Note - Infectious Disease   Nyla Gudino 79 y o  female MRN: [id number]  Unit/Bed#: W -01 Encounter: [id number]      Impression/Plan:  1   Septic shock, POA   Source is most likely pyelonephritis with secondary bacteremia   Patient is clinically much improved   Hypotension has resolved  Antibiotic plan as in below  Monitor temperature/WBC  Monitor hemodynamics      2  E  Coli bacteremia   Source is most likely pyelonephritis  Blood cultures 2 of 2 sets E coli pansensitive   Patient is clinically improved on IV ceftriaxone     Continues IV ceftriaxone at present        3   E coli Left-sided pyelonephritis with possible early abscess development    urine culture with pansensitive E coli  She is clinically improved on IV ceftriaxone   She remains without flank pain   Patient will need repeat imaging to see whether she developed angelina abscess, which would need drainage   Regardless, patient will most likely need long-term antibiotic, until phlegmon/abscess resolves completely   Hopefully, we can treat with p o  antibiotic  Continues IV ceftriaxone  Monitor temperature/WBC  Monitor for development of flank pain  Follow up renal US scheduled for this afternoon      4  Abnormal CXR and CT showing bibasilar atelectasis   Patient does not have pneumonia clinically   I suspect atelectasis secondary to left kidney infection/abscess  Monitor for development of respiratory symptoms      5  Diarrhea, most likely secondary to septic shock  Cece Bruner is much improved   Stool C diff toxin negative  Monitor for recurrent diarrhea      Discussed with patient and primary care team in detail regarding the above plan      Antibiotics:  Ceftriaxone # 4      Subjective:  Patient feels tired  No abdominal/flank pain  No urinary symptoms  Temperature stays down   No further chills  She is tolerating antibiotic well   No nausea, vomiting or diarrhea      ROS: Patient has no cough, shortness of breath; no new flank pain  No new symptoms  Objective:  Vitals:  Temp:  [98 °F (36 7 °C)-98 8 °F (37 1 °C)] 98 °F (36 7 °C)  HR:  [53-76] 61  Resp:  [16-17] 16  BP: (116-123)/(69-75) 122/69  SpO2:  [93 %-96 %] 93 %  Temp (24hrs), Av 3 °F (36 8 °C), Min:98 °F (36 7 °C), Max:98 8 °F (37 1 °C)  Current: Temperature: 98 °F (36 7 °C)    General Appearance:  Awake, alert, cooperative, resting in chair, no acute distress  Throat: Oropharynx moist without lesions  Lungs:   Clear to auscultation bilaterally; no wheezes, rhonchi or rales; respirations unlabored   Heart:  RRR; S1-S2 heard, no murmur   Abdomen:   Soft, non-tender, non-distended, positive bowel sounds  Extremities: No edema, arm IV site nontender   : No King catheter in place, no suprapubic tenderness, no CVA tenderness   Skin: No rashes      Labs, Imaging, & Other studies:   All pertinent labs and imaging studies were personally reviewed  Results from last 7 days   Lab Units 20  0510 20  0527 20  0216   WBC Thousand/uL 3 86* 3 48* 5 49   HEMOGLOBIN g/dL 10 4* 10 4* 12 4   PLATELETS Thousands/uL 151 132* 126*     Results from last 7 days   Lab Units 20  0510  20  0216   POTASSIUM mmol/L 3 5   < > 3 8   CHLORIDE mmol/L 108   < > 104   CO2 mmol/L 27   < > 26   BUN mg/dL 10   < > 16   CREATININE mg/dL 0 67   < > 0 79   EGFR ml/min/1 73sq m 84   < > 71   CALCIUM mg/dL 7 8*   < > 8 7   AST U/L  --   --  36   ALT U/L  --   --  21   ALK PHOS U/L  --   --  62    < > = values in this interval not displayed       Results from last 7 days   Lab Units 20  0934 20  0457   PROCALCITONIN ng/ml 3 59* 6 94*     Results from last 7 days   Lab Units 20  1256 20  0910 20  0242 20  0217 20  0200   BLOOD CULTURE   --   --   --  Escherichia coli* Escherichia coli*   GRAM STAIN RESULT   --   --   --  Gram negative rods* Gram negative rods*   URINE CULTURE   --   --  70,000-79,000 cfu/ml Escherichia coli*  --   -- LEGIONELLA URINARY ANTIGEN   --  Negative  --   --   --    C DIFF TOXIN B  Negative  --   --   --   --

## 2020-06-30 NOTE — PLAN OF CARE
Problem: PAIN - ADULT  Goal: Verbalizes/displays adequate comfort level or baseline comfort level  Description  Interventions:  - Encourage patient to monitor pain and request assistance  - Assess pain using appropriate pain scale  - Administer analgesics based on type and severity of pain and evaluate response  - Implement non-pharmacological measures as appropriate and evaluate response  - Consider cultural and social influences on pain and pain management  - Notify physician/advanced practitioner if interventions unsuccessful or patient reports new pain  6/30/2020 0929 by Cristin Keyes  Outcome: Progressing  6/30/2020 0403 by Cristin Keyes  Outcome: Progressing     Problem: INFECTION - ADULT  Goal: Absence or prevention of progression during hospitalization  Description  INTERVENTIONS:  - Assess and monitor for signs and symptoms of infection  - Monitor lab/diagnostic results  - Monitor all insertion sites, i e  indwelling lines, tubes, and drains  - Monitor endotracheal if appropriate and nasal secretions for changes in amount and color  - [place] appropriate cooling/warming therapies per order  - Administer medications as ordered  - Instruct and encourage patient and family to use good hand hygiene technique  - Identify and instruct in appropriate isolation precautions for identified infection/condition  6/30/2020 0929 by Cristin Keyes  Outcome: Progressing  6/30/2020 0403 by Cristin Keyes  Outcome: Progressing     Problem: SAFETY ADULT  Goal: Patient will remain free of falls  Description  INTERVENTIONS:  - Assess patient frequently for physical needs  -  Identify cognitive and physical deficits and behaviors that affect risk of falls    -  [place] fall precautions as indicated by assessment   - Educate patient/family on patient safety including physical limitations  - Instruct patient to call for assistance with activity based on assessment  - Modify environment to reduce risk of injury  - Consider OT/PT consult to assist with strengthening/mobility  6/30/2020 0929 by Alisson Michaud  Outcome: Progressing  6/30/2020 0403 by Alisson Michaud  Outcome: Progressing  Goal: Maintain or return to baseline ADL function  Description  INTERVENTIONS:  -  Assess patient's ability to carry out ADLs; assess patient's baseline for ADL function and identify physical deficits which impact ability to perform ADLs (bathing, care of mouth/teeth, toileting, grooming, dressing, etc )  - Assess/evaluate cause of self-care deficits   - Assess range of motion  - Assess patient's mobility; develop plan if impaired  - Assess patient's need for assistive devices and provide as appropriate  - Encourage maximum independence but intervene and supervise when necessary  - Involve family in performance of ADLs  - Assess for home care needs following discharge   - Consider OT consult to assist with ADL evaluation and planning for discharge  - Provide patient education as appropriate  6/30/2020 0929 by Alisson Michaud  Outcome: Progressing  6/30/2020 0403 by Alisson Michaud  Outcome: Progressing  Goal: Maintain or return mobility status to optimal level  Description  INTERVENTIONS:  - Assess patient's baseline mobility status (ambulation, transfers, stairs, etc )    - Identify cognitive and physical deficits and behaviors that affect mobility  - Identify mobility aids required to assist with transfers and/or ambulation (gait belt, sit-to-stand, lift, walker, cane, etc )  - [place] fall precautions as indicated by assessment  - Record patient progress and toleration of activity level on Mobility SBAR; progress patient to next Phase/Stage  - Instruct patient to call for assistance with activity based on assessment  - Consider rehabilitation consult to assist with strengthening/weightbearing, etc   6/30/2020 0929 by Alisson Michaud  Outcome: Progressing  6/30/2020 0403 by Alisson Michaud  Outcome: Progressing     Problem: DISCHARGE PLANNING  Goal: Discharge to home or other facility with appropriate resources  Description  INTERVENTIONS:  - Identify barriers to discharge w/patient and caregiver  - Arrange for needed discharge resources and transportation as appropriate  - Identify discharge learning needs (meds, wound care, etc )  - Arrange for interpretive services to assist at discharge as needed  - Refer to Case Management Department for coordinating discharge planning if the patient needs post-hospital services based on physician/advanced practitioner order or complex needs related to functional status, cognitive ability, or social support system  6/30/2020 0929 by Azalea Maddox  Outcome: Progressing  6/30/2020 0403 by Azalea Maddox  Outcome: Progressing     Problem: Knowledge Deficit  Goal: Patient/family/caregiver demonstrates understanding of disease process, treatment plan, medications, and discharge instructions  Description  Complete learning assessment and assess knowledge base    Interventions:  - Provide teaching at level of understanding  - Provide teaching via preferred learning methods  6/30/2020 0929 by Azalea Maddox  Outcome: Progressing  6/30/2020 0403 by Azalea Maddox  Outcome: Progressing

## 2020-06-30 NOTE — ASSESSMENT & PLAN NOTE
· Patient had sepsis present on admission evidenced by fever and hypotension  Source of infection was pyelonephritis/E  Coli bacteremia  There was no organ dysfunction present and hypotension improved with IVF and albumin   COVID-19 negative    · Appreciate on going ID and urology input  · Continue Rocphin  · Follow up renal ultrasound  · Monitor hemodynamics

## 2020-06-30 NOTE — PLAN OF CARE
Problem: PAIN - ADULT  Goal: Verbalizes/displays adequate comfort level or baseline comfort level  Description  Interventions:  - Encourage patient to monitor pain and request assistance  - Assess pain using appropriate pain scale  - Administer analgesics based on type and severity of pain and evaluate response  - Implement non-pharmacological measures as appropriate and evaluate response  - Consider cultural and social influences on pain and pain management  - Notify physician/advanced practitioner if interventions unsuccessful or patient reports new pain  Outcome: Progressing     Problem: INFECTION - ADULT  Goal: Absence or prevention of progression during hospitalization  Description  INTERVENTIONS:  - Assess and monitor for signs and symptoms of infection  - Monitor lab/diagnostic results  - Monitor all insertion sites, i e  indwelling lines, tubes, and drains  - Monitor endotracheal if appropriate and nasal secretions for changes in amount and color  - [place] appropriate cooling/warming therapies per order  - Administer medications as ordered  - Instruct and encourage patient and family to use good hand hygiene technique  - Identify and instruct in appropriate isolation precautions for identified infection/condition  Outcome: Progressing     Problem: SAFETY ADULT  Goal: Patient will remain free of falls  Description  INTERVENTIONS:  - Assess patient frequently for physical needs  -  Identify cognitive and physical deficits and behaviors that affect risk of falls    -  [place] fall precautions as indicated by assessment   - Educate patient/family on patient safety including physical limitations  - Instruct patient to call for assistance with activity based on assessment  - Modify environment to reduce risk of injury  - Consider OT/PT consult to assist with strengthening/mobility  Outcome: Progressing  Goal: Maintain or return to baseline ADL function  Description  INTERVENTIONS:  -  Assess patient's ability to carry out ADLs; assess patient's baseline for ADL function and identify physical deficits which impact ability to perform ADLs (bathing, care of mouth/teeth, toileting, grooming, dressing, etc )  - Assess/evaluate cause of self-care deficits   - Assess range of motion  - Assess patient's mobility; develop plan if impaired  - Assess patient's need for assistive devices and provide as appropriate  - Encourage maximum independence but intervene and supervise when necessary  - Involve family in performance of ADLs  - Assess for home care needs following discharge   - Consider OT consult to assist with ADL evaluation and planning for discharge  - Provide patient education as appropriate  Outcome: Progressing  Goal: Maintain or return mobility status to optimal level  Description  INTERVENTIONS:  - Assess patient's baseline mobility status (ambulation, transfers, stairs, etc )    - Identify cognitive and physical deficits and behaviors that affect mobility  - Identify mobility aids required to assist with transfers and/or ambulation (gait belt, sit-to-stand, lift, walker, cane, etc )  - [place] fall precautions as indicated by assessment  - Record patient progress and toleration of activity level on Mobility SBAR; progress patient to next Phase/Stage  - Instruct patient to call for assistance with activity based on assessment  - Consider rehabilitation consult to assist with strengthening/weightbearing, etc   Outcome: Progressing     Problem: DISCHARGE PLANNING  Goal: Discharge to home or other facility with appropriate resources  Description  INTERVENTIONS:  - Identify barriers to discharge w/patient and caregiver  - Arrange for needed discharge resources and transportation as appropriate  - Identify discharge learning needs (meds, wound care, etc )  - Arrange for interpretive services to assist at discharge as needed  - Refer to Case Management Department for coordinating discharge planning if the patient needs post-hospital services based on physician/advanced practitioner order or complex needs related to functional status, cognitive ability, or social support system  Outcome: Progressing     Problem: Knowledge Deficit  Goal: Patient/family/caregiver demonstrates understanding of disease process, treatment plan, medications, and discharge instructions  Description  Complete learning assessment and assess knowledge base    Interventions:  - Provide teaching at level of understanding  - Provide teaching via preferred learning methods  Outcome: Progressing

## 2020-07-01 VITALS
WEIGHT: 167 LBS | TEMPERATURE: 98.2 F | BODY MASS INDEX: 26.21 KG/M2 | RESPIRATION RATE: 18 BRPM | DIASTOLIC BLOOD PRESSURE: 55 MMHG | OXYGEN SATURATION: 96 % | HEIGHT: 67 IN | SYSTOLIC BLOOD PRESSURE: 115 MMHG | HEART RATE: 54 BPM

## 2020-07-01 PROBLEM — N28.89 RENAL MASS, LEFT: Status: ACTIVE | Noted: 2020-07-01

## 2020-07-01 LAB
ANION GAP SERPL CALCULATED.3IONS-SCNC: 6 MMOL/L (ref 4–13)
BASOPHILS # BLD AUTO: 0.03 THOUSANDS/ΜL (ref 0–0.1)
BASOPHILS NFR BLD AUTO: 1 % (ref 0–1)
BUN SERPL-MCNC: 11 MG/DL (ref 5–25)
CALCIUM SERPL-MCNC: 8.4 MG/DL (ref 8.3–10.1)
CHLORIDE SERPL-SCNC: 110 MMOL/L (ref 100–108)
CO2 SERPL-SCNC: 27 MMOL/L (ref 21–32)
CREAT SERPL-MCNC: 0.59 MG/DL (ref 0.6–1.3)
EOSINOPHIL # BLD AUTO: 0.14 THOUSAND/ΜL (ref 0–0.61)
EOSINOPHIL NFR BLD AUTO: 3 % (ref 0–6)
ERYTHROCYTE [DISTWIDTH] IN BLOOD BY AUTOMATED COUNT: 12.9 % (ref 11.6–15.1)
GFR SERPL CREATININE-BSD FRML MDRD: 87 ML/MIN/1.73SQ M
GLUCOSE SERPL-MCNC: 85 MG/DL (ref 65–140)
HCT VFR BLD AUTO: 35 % (ref 34.8–46.1)
HGB BLD-MCNC: 11.2 G/DL (ref 11.5–15.4)
IMM GRANULOCYTES # BLD AUTO: 0.01 THOUSAND/UL (ref 0–0.2)
IMM GRANULOCYTES NFR BLD AUTO: 0 % (ref 0–2)
LYMPHOCYTES # BLD AUTO: 1.11 THOUSANDS/ΜL (ref 0.6–4.47)
LYMPHOCYTES NFR BLD AUTO: 26 % (ref 14–44)
MCH RBC QN AUTO: 30.7 PG (ref 26.8–34.3)
MCHC RBC AUTO-ENTMCNC: 32 G/DL (ref 31.4–37.4)
MCV RBC AUTO: 96 FL (ref 82–98)
MONOCYTES # BLD AUTO: 0.43 THOUSAND/ΜL (ref 0.17–1.22)
MONOCYTES NFR BLD AUTO: 10 % (ref 4–12)
NEUTROPHILS # BLD AUTO: 2.49 THOUSANDS/ΜL (ref 1.85–7.62)
NEUTS SEG NFR BLD AUTO: 60 % (ref 43–75)
NRBC BLD AUTO-RTO: 0 /100 WBCS
PLATELET # BLD AUTO: 178 THOUSANDS/UL (ref 149–390)
PMV BLD AUTO: 11.1 FL (ref 8.9–12.7)
POTASSIUM SERPL-SCNC: 3.7 MMOL/L (ref 3.5–5.3)
PROCALCITONIN SERPL-MCNC: 0.91 NG/ML
RBC # BLD AUTO: 3.65 MILLION/UL (ref 3.81–5.12)
SODIUM SERPL-SCNC: 143 MMOL/L (ref 136–145)
WBC # BLD AUTO: 4.21 THOUSAND/UL (ref 4.31–10.16)

## 2020-07-01 PROCEDURE — 99239 HOSP IP/OBS DSCHRG MGMT >30: CPT | Performed by: PHYSICIAN ASSISTANT

## 2020-07-01 PROCEDURE — 84145 PROCALCITONIN (PCT): CPT | Performed by: PHYSICIAN ASSISTANT

## 2020-07-01 PROCEDURE — 99233 SBSQ HOSP IP/OBS HIGH 50: CPT | Performed by: PHYSICIAN ASSISTANT

## 2020-07-01 PROCEDURE — 80048 BASIC METABOLIC PNL TOTAL CA: CPT | Performed by: PHYSICIAN ASSISTANT

## 2020-07-01 PROCEDURE — 99232 SBSQ HOSP IP/OBS MODERATE 35: CPT | Performed by: NURSE PRACTITIONER

## 2020-07-01 PROCEDURE — 85025 COMPLETE CBC W/AUTO DIFF WBC: CPT | Performed by: PHYSICIAN ASSISTANT

## 2020-07-01 RX ORDER — CEPHALEXIN 500 MG/1
500 CAPSULE ORAL EVERY 6 HOURS SCHEDULED
Qty: 22 CAPSULE | Refills: 0 | Status: SHIPPED | OUTPATIENT
Start: 2020-07-01 | End: 2020-07-06

## 2020-07-01 RX ADMIN — SERTRALINE HYDROCHLORIDE 50 MG: 50 TABLET ORAL at 08:34

## 2020-07-01 RX ADMIN — CEPHALEXIN 500 MG: 500 CAPSULE ORAL at 12:03

## 2020-07-01 RX ADMIN — CEPHALEXIN 500 MG: 500 CAPSULE ORAL at 04:55

## 2020-07-01 NOTE — ASSESSMENT & PLAN NOTE
· Patient with mild bibasilar atelectasis most likely secondary to underlying pyelonephritis   Patient does not have pneumonia clinically although procalcitonin elevated at 6 94  Clinically her respiratory status is stable   · Continue to monitor for respiratory sx

## 2020-07-01 NOTE — ASSESSMENT & PLAN NOTE
· Patient has 2/2 blood cultures positive for E  Coli bacteria secondary to pyelonephritis   ID planning to treat thru 7/6/20   · Continue antibiotics as above   · Renal US reviewed

## 2020-07-01 NOTE — ASSESSMENT & PLAN NOTE
· Patient had fevers, fatigue, nausea  Urine and blood cultures both positive for E  Coli   CT scan on admission showing possible early consolidation representing localized pyelonephritis versus early abscess  · Renal US shows L renal mass concerning for RCC  · Appreciate ID and Urology input

## 2020-07-01 NOTE — PROGRESS NOTES
Progress Note - Infectious Disease   Addis Ovalles 79 y o  female MRN: [id number]  Unit/Bed#: W -01 Encounter: [id number]      Impression/Plan:  1   Septic shock, POA   Source is most likely pyelonephritis with secondary bacteremia   Patient is clinically much improved   Hypotension has resolved  Antibiotic plan as in below  Monitor temperature/WBC  Monitor hemodynamics      2  E  Coli bacteremia   Source is most likely pyelonephritis   Blood cultures 2 of 2 sets E coli pansensitive   Patient is clinically improved on antibiotic    Transitioned to PO Keflex as below     3   E coli Left-sided pyelonephritis with possible early abscess development    urine culture with pansensitive E coli  Renal US showing no abscess but 3 6 cm left kidney masslike focus with internal vascularity  Transitioned from Ceftriaxone to Cephalexin to complete 10 day antibiotic course through 7/6/20  Monitor temperature/WBC  Monitor for development of flank pain  Urology consulted      4  Abnormal CXR and CT showing bibasilar atelectasis   Patient does not have pneumonia clinically   I suspect atelectasis secondary to left kidney infection/abscess  Monitor for development of respiratory symptoms      5  Diarrhea, most likely secondary to septic shock  Price Harden is much improved   Stool C diff toxin negative  Monitor for recurrent diarrhea      Discussed with patient, daughter at bedside, RNVern, Urology, NP and Luke Al detail regarding the above plan  All of patient and daughter's questions answered and reassurance given  I personally spent over half of a total 35 minutes in counseling and discussion with the patient and coordination of care as described above       Antibiotics:  Cephalexin abx # 5      Subjective:  Patient feels somewhat tired  No abdominal/flank pain  + chronic low back pain unchanged  No urinary symptoms  Temperature stays down   No further chills    She is tolerating PO antibiotic well   No nausea, vomiting or diarrhea  ROS: Patient has no cough, shortness of breath; no new pain  No new symptoms  Objective:  Vitals:  Temp:  [98 2 °F (36 8 °C)-98 9 °F (37 2 °C)] 98 2 °F (36 8 °C)  HR:  [54-68] 54  Resp:  [18] 18  BP: (115-138)/(55-80) 115/55  SpO2:  [95 %-97 %] 96 %  Temp (24hrs), Av 5 °F (36 9 °C), Min:98 2 °F (36 8 °C), Max:98 9 °F (37 2 °C)  Current: Temperature: 98 2 °F (36 8 °C)    General Appearance:  Awake, alert, cooperative, resting in chair, no acute distress  Throat: Oropharynx moist without lesions  Lungs:   Clear to auscultation bilaterally; no wheezes, rhonchi or rales; respirations unlabored   Heart:  RRR; S1-S2 heard, no murmur   Abdomen:   Soft, non-tender, non-distended, positive bowel sounds  Extremities: No edema, arm IV site nontender   : No vela, no SPT, no flank/CVAT   Skin: No rashes      Labs, Imaging, & Other studies:   All pertinent labs and imaging studies were personally reviewed  Results from last 7 days   Lab Units 20  04520  0510 20  0527   WBC Thousand/uL 4 21* 3 86* 3 48*   HEMOGLOBIN g/dL 11 2* 10 4* 10 4*   PLATELETS Thousands/uL 178 151 132*     Results from last 7 days   Lab Units 20  0459  20  0216   POTASSIUM mmol/L 3 7   < > 3 8   CHLORIDE mmol/L 110*   < > 104   CO2 mmol/L 27   < > 26   BUN mg/dL 11   < > 16   CREATININE mg/dL 0 59*   < > 0 79   EGFR ml/min/1 73sq m 87   < > 71   CALCIUM mg/dL 8 4   < > 8 7   AST U/L  --   --  36   ALT U/L  --   --  21   ALK PHOS U/L  --   --  62    < > = values in this interval not displayed       Results from last 7 days   Lab Units 20  0459 20  0934 20  0457   PROCALCITONIN ng/ml 0 91* 3 59* 6 94*     Results from last 7 days   Lab Units 20  1256 20  0910 20  0242 20  0217 20  0200   BLOOD CULTURE   --   --   --  Escherichia coli* Escherichia coli*   GRAM STAIN RESULT   --   --   --  Gram negative rods* Gram negative rods*   URINE CULTURE   --   --  70,000-79,000 cfu/ml Escherichia coli*  --   --    LEGIONELLA URINARY ANTIGEN   --  Negative  --   --   --    C DIFF TOXIN B  Negative  --   --   --   --

## 2020-07-01 NOTE — ASSESSMENT & PLAN NOTE
· Initially, CT A/P shows a ?  L renal consolidation, possible abscess  · Renal US performed, and showed that mass was solid (no fluid to biopsy) and there was concern for primary kidney cancer  · D/W urology: will arrange for OP work up of this newly diagnosed renal mass

## 2020-07-01 NOTE — DISCHARGE SUMMARY
Discharge- April Oneal 1940, 79 y o  female MRN: [id number]    Unit/Bed#: W -01 Encounter: [id number]    Primary Care Provider: Celina Gaona DO   Date and time admitted to hospital: 6/27/2020  1:52 AM    * Sepsis due to Escherichia coli without acute organ dysfunction [facility])  Assessment & Plan  · Patient had sepsis present on admission evidenced by fever and hypotension  Source of infection was pyelonephritis/E  Coli bacteremia  There was no organ dysfunction present and hypotension improved with IVF and albumin  COVID-19 negative    · Appreciate on going ID and urology input  · Rocephin transitioned to keflex 500mg PO Q6 thru 7/6/20    Renal mass, left  Assessment & Plan  · Initially, CT A/P shows a ? L renal consolidation, possible abscess  · Renal US performed, and showed that mass was solid (no fluid to biopsy) and there was concern for primary kidney cancer  · D/W urology: will arrange for OP work up of this newly diagnosed renal mass    E coli bacteremia  Assessment & Plan  · Patient has 2/2 blood cultures positive for E  Coli bacteria secondary to pyelonephritis  ID planning to treat thru 7/6/20   · Continue antibiotics as above   · Renal US reviewed    Bilateral atelectasis  Assessment & Plan  · Patient with mild bibasilar atelectasis most likely secondary to underlying pyelonephritis  Patient does not have pneumonia clinically although procalcitonin elevated at 6 94  Clinically her respiratory status is stable   · Continue to monitor for respiratory sx      Diarrhea  Assessment & Plan  · Stool studies, C  Diff negative  · No further work up needed     Pyelonephritis  Assessment & Plan  · Patient had fevers, fatigue, nausea  Urine and blood cultures both positive for E  Coli   CT scan on admission showing possible early consolidation representing localized pyelonephritis versus early abscess  · Renal US shows L renal mass concerning for RCC  · Appreciate ID and Urology input Discharging Physician / Practitioner: Ralph Reyna PA-C  PCP: Andre Cruz DO  Admission Date:   Admission Orders (From admission, onward)     Ordered        06/27/20 0604  Inpatient Admission  Once                   Discharge Date: 07/01/20    Resolved Problems  Date Reviewed: 7/1/2020          Resolved    Hypotension 6/30/2020     Resolved by  Phillip Herrera PA-C          Consultations During Hospital Stay:  · ID  · Urology     Procedures Performed:   · CXR portable  · PE study w/ CT A/P  · US kidney and bladder    Significant Findings / Test Results:   · CXR portable  · Cardiomediastinal silhouette appears unremarkable  · Mild bibasilar atelectasis  No effusion or pneumothorax  · Osseous structures appear within normal limits for patient age  · PE study w/ CT A/P  · There is a somewhat ill-defined heterogeneous area of hypodensity within the posterior aspect of the interpolar region left kidney which measures approximately 3 6 cm  The differential diagnosis includes focal pyelonephritis, early renal abscess formation, and renal neoplasm  Follow-up is required  Urology consultation and follow-up is recommended  Please see above discussion  · There is bibasilar atelectasis  Superimposed pneumonia should be excluded clinically  · No evidence of pulmonary embolism is seen  · Other findings as described above, please see discussion  · US kidney and bladder  · Normal echogenicity and contour  · Previous midpole heterogeneously enhancing area now appears as a 3 6 x 3 2 x 3 2 cm predominantly hyperechoic focus with mostly well-defined margins  Internal vascularity is seen  · No hydronephrosis  · No shadowing calculi  · No perinephric fluid collections      Incidental Findings:   · See results of US kidney and bladder     Test Results Pending at Discharge (will require follow up):   · none     Outpatient Tests Requested:  · Testing ordered by Urology for evaluation of renal mass    Complications:  none    Reason for Admission: Generalized weakness, pyelonephritis     Hospital Course:     Meenu Miranda is a 79 y o  female patient who originally presented to the hospital on 6/27/2020 due to generalized weakness with nausea and vomiting  Patient had lab work concerning for sepsis from  UTI and possible L sided pyelo with early abscess formation  Patient was admitted and started on IV abx (Rocephin) and within 24 hours was noted to have GNR bacteremia  ID was consulted, and felt this was 2/2 UTI/Pyelo-- she remained on IV rocephin until her urine culture and blood cultures finalized and grew E coli that was pan-sensitive  She will complete a course of Keflex 500mg PO Q6 thru 7/6/20  Urology also evaluated the patient, and recommended further evaluation of ? L sided renal abscess with US to determine if this would be amenable to IR biopsy  Unfortunately, renal US revealed that the mass that was previously noted on CT A/P appear more solid than fluid filled and it was concerning for cancer  This was discussed with patient, who will follow up with urology as an OP for definitive planning and treatment  Patient was also noted to have diarrhea during her admission  She underwent stool studies including c diff and did not have infectious diarrhea  Her diarrhea resolved without intervention  Please see above list of diagnoses and related plan for additional information  Condition at Discharge: fair     Discharge Day Visit / Exam:     Subjective:  Patient is feeling well  No pain, nausea/vomiting  Tolerating the oral antibiotics well     Vitals: Blood Pressure: 115/55 (07/01/20 0719)  Pulse: (!) 54 (07/01/20 0719)  Temperature: 98 2 °F (36 8 °C) (07/01/20 0719)  Temp Source: Oral (06/30/20 0721)  Respirations: 18 (07/01/20 0719)  Height: 5' 7" (170 2 cm) (06/27/20 0801)  Weight - Scale: 75 8 kg (167 lb) (07/01/20 0600)  SpO2: 96 % (07/01/20 0719)  Exam:   Physical Exam Constitutional: She is oriented to person, place, and time  No distress  HENT:   Head: Normocephalic and atraumatic  Eyes: Pupils are equal, round, and reactive to light  EOM are normal    Neck: No JVD present  Cardiovascular: Normal rate and regular rhythm  Exam reveals no gallop and no friction rub  No murmur heard  Pulmonary/Chest: Effort normal and breath sounds normal  No stridor  No respiratory distress  She has no wheezes  Abdominal: Soft  Bowel sounds are normal  She exhibits no distension  There is no tenderness  There is no guarding  Musculoskeletal: Normal range of motion  She exhibits no edema  Neurological: She is alert and oriented to person, place, and time  She displays normal reflexes  No cranial nerve deficit  Coordination normal    Skin: Skin is warm and dry  She is not diaphoretic  Psychiatric: She has a normal mood and affect  Her behavior is normal      Discussion with Family: daughter at bedside     Discharge instructions/Information to patient and family:   See after visit summary for information provided to patient and family  Provisions for Follow-Up Care:  See after visit summary for information related to follow-up care and any pertinent home health orders  Disposition:     Home    For Discharges to [facility]F:   · Not Applicable to this Patient - Not Applicable to this Patient    Planned Readmission: none     Discharge Statement:  I spent 35 minutes discharging the patient  This time was spent on the day of discharge  I had direct contact with the patient on the day of discharge  Greater than 50% of the total time was spent examining patient, answering all patient questions, arranging and discussing plan of care with patient as well as directly providing post-discharge instructions  Additional time then spent on discharge activities      Discharge Medications:  See after visit summary for reconciled discharge medications provided to patient and family        ** Please Note: This note has been constructed using a voice recognition system **

## 2020-07-01 NOTE — DISCHARGE INSTRUCTIONS
Kidney Infection   WHAT YOU NEED TO KNOW:   A kidney infection, or pyelonephritis, is a bacterial infection  The infection usually starts in your bladder or urethra and moves into your kidney  One or both kidneys may be infected  DISCHARGE INSTRUCTIONS:   Seek care immediately if:   · You have a fever and chills  · You cannot stop vomiting  · You have severe pain in your abdomen, lower back, or sides  Contact your healthcare provider if:   · You continue to have a fever after you take antibiotics for 3 days  · You have pain when you urinate, even after treatment  · Your signs and symptoms return  · You have questions or concerns about your condition or care  Medicines: You may  need any of the following:  · Antibiotics  treat your bacterial infection  · Acetaminophen  decreases pain and fever  It is available without a doctor's order  Ask how much to take and how often to take it  Follow directions  Read the labels of all other medicines you are using to see if they also contain acetaminophen, or ask your doctor or pharmacist  Acetaminophen can cause liver damage if not taken correctly  Do not use more than 4 grams (4,000 milligrams) total of acetaminophen in one day  · NSAIDs , such as ibuprofen, help decrease swelling, pain, and fever  This medicine is available with or without a doctor's order  NSAIDs can cause stomach bleeding or kidney problems in certain people  If you take blood thinner medicine, always ask if NSAIDs are safe for you  Always read the medicine label and follow directions  Do not give these medicines to children under 10months of age without direction from your child's healthcare provider  · Prescription pain medicine  may be given  Ask how to take this medicine safely  · Take your medicine as directed  Contact your healthcare provider if you think your medicine is not helping or if you have side effects   Tell him of her if you are allergic to any medicine  Keep a list of the medicines, vitamins, and herbs you take  Include the amounts, and when and why you take them  Bring the list or the pill bottles to follow-up visits  Carry your medicine list with you in case of an emergency  Drink liquids as directed: You may need to drink extra liquids to help flush your kidneys and urinary system  Water is the best liquid to drink  Ask your healthcare provider how much liquid to drink each day and which liquids are best for you  Urinate as soon as you feel the urge: This will help flush bacteria from your urinary system  Do not wait or hold your urine for too long  Clean your genital area every day with soap and water:  Wipe from front to back after you urinate or have a bowel movement  Wear cotton underwear  Fabrics such as nylon and polyester can stay damp  This can increase your risk for infection  Urinate within 15 minutes after you have sex  Follow up with your healthcare provider as directed:  Write down your questions so you remember to ask them during your visits  © 2017 [facility] Information is for End User's use only and may not be sold, redistributed or otherwise used for commercial purposes  All illustrations and images included in CareNotes® are the copyrighted property of A D [employer] M [employer]  or Luis Gonzalez  The above information is an  only  It is not intended as medical advice for individual conditions or treatments  Talk to your doctor, nurse or pharmacist before following any medical regimen to see if it is safe and effective for you

## 2020-07-01 NOTE — PLAN OF CARE
Problem: PAIN - ADULT  Goal: Verbalizes/displays adequate comfort level or baseline comfort level  Description  Interventions:  - Encourage patient to monitor pain and request assistance  - Assess pain using appropriate pain scale  - Administer analgesics based on type and severity of pain and evaluate response  - Implement non-pharmacological measures as appropriate and evaluate response  - Consider cultural and social influences on pain and pain management  - Notify physician/advanced practitioner if interventions unsuccessful or patient reports new pain  Outcome: Progressing     Problem: INFECTION - ADULT  Goal: Absence or prevention of progression during hospitalization  Description  INTERVENTIONS:  - Assess and monitor for signs and symptoms of infection  - Monitor lab/diagnostic results  - Monitor all insertion sites, i e  indwelling lines, tubes, and drains  - Monitor endotracheal if appropriate and nasal secretions for changes in amount and color  - [place] appropriate cooling/warming therapies per order  - Administer medications as ordered  - Instruct and encourage patient and family to use good hand hygiene technique  - Identify and instruct in appropriate isolation precautions for identified infection/condition  Outcome: Progressing     Problem: SAFETY ADULT  Goal: Patient will remain free of falls  Description  INTERVENTIONS:  - Assess patient frequently for physical needs  -  Identify cognitive and physical deficits and behaviors that affect risk of falls    -  [place] fall precautions as indicated by assessment   - Educate patient/family on patient safety including physical limitations  - Instruct patient to call for assistance with activity based on assessment  - Modify environment to reduce risk of injury  - Consider OT/PT consult to assist with strengthening/mobility  Outcome: Progressing  Goal: Maintain or return to baseline ADL function  Description  INTERVENTIONS:  -  Assess patient's ability to carry out ADLs; assess patient's baseline for ADL function and identify physical deficits which impact ability to perform ADLs (bathing, care of mouth/teeth, toileting, grooming, dressing, etc )  - Assess/evaluate cause of self-care deficits   - Assess range of motion  - Assess patient's mobility; develop plan if impaired  - Assess patient's need for assistive devices and provide as appropriate  - Encourage maximum independence but intervene and supervise when necessary  - Involve family in performance of ADLs  - Assess for home care needs following discharge   - Consider OT consult to assist with ADL evaluation and planning for discharge  - Provide patient education as appropriate  Outcome: Progressing  Goal: Maintain or return mobility status to optimal level  Description  INTERVENTIONS:  - Assess patient's baseline mobility status (ambulation, transfers, stairs, etc )    - Identify cognitive and physical deficits and behaviors that affect mobility  - Identify mobility aids required to assist with transfers and/or ambulation (gait belt, sit-to-stand, lift, walker, cane, etc )  - [place] fall precautions as indicated by assessment  - Record patient progress and toleration of activity level on Mobility SBAR; progress patient to next Phase/Stage  - Instruct patient to call for assistance with activity based on assessment  - Consider rehabilitation consult to assist with strengthening/weightbearing, etc   Outcome: Progressing     Problem: DISCHARGE PLANNING  Goal: Discharge to home or other facility with appropriate resources  Description  INTERVENTIONS:  - Identify barriers to discharge w/patient and caregiver  - Arrange for needed discharge resources and transportation as appropriate  - Identify discharge learning needs (meds, wound care, etc )  - Arrange for interpretive services to assist at discharge as needed  - Refer to Case Management Department for coordinating discharge planning if the patient needs post-hospital services based on physician/advanced practitioner order or complex needs related to functional status, cognitive ability, or social support system  Outcome: Progressing     Problem: Knowledge Deficit  Goal: Patient/family/caregiver demonstrates understanding of disease process, treatment plan, medications, and discharge instructions  Description  Complete learning assessment and assess knowledge base    Interventions:  - Provide teaching at level of understanding  - Provide teaching via preferred learning methods  Outcome: Progressing

## 2020-07-01 NOTE — PROGRESS NOTES
Progress Note - Lisa Platt 79 y o  female MRN: [id number]    Unit/Bed#: W -01 Encounter: [id number]      Assessment: Lisa Platt is a 78 yo female that presented with several days of generalized weakness, nausea, and chills  CT of the abdomen shows there is a somewhat ill-defined heterogeneous area of hypodensity within the posterior aspect of the interpolar region left kidney which measures approximately 3 6 cm  Blood cultures positive for E coli bacteremia  She's had normal WBC count and creatinine  Repeat ultrasound imaging for further characterization of 3 6 cm ill-defined heterogeneous area of hypodensity of left interpolar kidney previously identified on CT was consistent with RCC rather than abscess  Plan:  Continue medical management  Antibiotic course as recommended by ID with conversion of ceftriaxone to Keflex for 10 day course through 7/6  Patient will require further outpatient workup for renal mass to include repeat imaging and possible percutaneous biopsy  Our service will contact patient with hospital follow-up appointment date and time  Subjective:   Denies fever, chills, flank, abdominal or suprapubic pain     Objective:     Vitals: Blood pressure 115/55, pulse (!) 54, temperature 98 2 °F (36 8 °C), resp  rate 18, height 5' 7" (1 702 m), weight 75 8 kg (167 lb), SpO2 96 %  ,Body mass index is 26 16 kg/m²        Intake/Output Summary (Last 24 hours) at 7/1/2020 1428  Last data filed at 7/1/2020 1100  Gross per 24 hour   Intake 2935 83 ml   Output    Net 2935 83 ml       Physical Exam:   General appearance: alert and oriented, in no acute distress, appears stated age, cooperative and no distress  Head: Normocephalic, without obvious abnormality, atraumatic  Neck: no adenopathy, no carotid bruit, no JVD, supple, symmetrical, trachea midline and thyroid not enlarged, symmetric, no tenderness/mass/nodules  Lungs: diminished breath sounds  Heart: regular rate and rhythm, S1, S2 normal, no murmur, click, rub or gallop  Abdomen: soft, non-tender; bowel sounds normal; no masses,  no organomegaly  Extremities: extremities normal, warm and well-perfused; no cyanosis, clubbing, or edema  Pulses: 2+ and symmetric  Neurologic: Grossly normal  No external urinary drains      Invasive Devices     Peripheral Intravenous Line            Peripheral IV 06/27/20 Left Forearm 4 days    Peripheral IV 06/27/20 Left Forearm 4 days              Lab Results   Component Value Date    WBC 4 21 (L) 07/01/2020    HGB 11 2 (L) 07/01/2020    HCT 35 0 07/01/2020    MCV 96 07/01/2020     07/01/2020     Lab Results   Component Value Date    SODIUM 143 07/01/2020    K 3 7 07/01/2020     (H) 07/01/2020    CO2 27 07/01/2020    BUN 11 07/01/2020    CREATININE 0 59 (L) 07/01/2020    GLUC 85 07/01/2020    CALCIUM 8 4 07/01/2020       Lab, Imaging and other studies: I have personally reviewed pertinent reports      VTE Pharmacologic Prophylaxis: Sequential compression device (Venodyne)   VTE Mechanical Prophylaxis: sequential compression device

## 2020-07-01 NOTE — ASSESSMENT & PLAN NOTE
· Patient had sepsis present on admission evidenced by fever and hypotension  Source of infection was pyelonephritis/E  Coli bacteremia  There was no organ dysfunction present and hypotension improved with IVF and albumin   COVID-19 negative    · Appreciate on going ID and urology input  · Rocephin transitioned to keflex 500mg PO Q6 thru 7/6/20

## 2020-07-02 ENCOUNTER — EPISODE CHANGES (OUTPATIENT)
Dept: CASE MANAGEMENT | Facility: OTHER | Age: 80
End: 2020-07-02

## 2020-07-21 ENCOUNTER — PATIENT OUTREACH (OUTPATIENT)
Dept: CASE MANAGEMENT | Facility: HOSPITAL | Age: 80
End: 2020-07-21

## 2020-07-22 ENCOUNTER — PATIENT OUTREACH (OUTPATIENT)
Dept: CASE MANAGEMENT | Facility: HOSPITAL | Age: 80
End: 2020-07-22

## 2020-07-22 NOTE — PROGRESS NOTES
Outpatient Care Management Note:  RE: Pt returned message left for her  She states that she is doing very well and 'is not sick'  She was appreciative of the call  Informed her of the bundle program and verified that she would call should any concerns arise

## 2020-09-28 ENCOUNTER — EPISODE CHANGES (OUTPATIENT)
Dept: CASE MANAGEMENT | Facility: OTHER | Age: 80
End: 2020-09-28

## 2022-11-03 ENCOUNTER — LAB REQUISITION (OUTPATIENT)
Dept: LAB | Facility: HOSPITAL | Age: 82
End: 2022-11-03

## 2022-11-03 DIAGNOSIS — K22.89 OTHER SPECIFIED DISEASE OF ESOPHAGUS: ICD-10-CM

## 2022-11-03 DIAGNOSIS — R10.13 EPIGASTRIC PAIN: ICD-10-CM

## 2022-11-03 DIAGNOSIS — K31.89 OTHER DISEASES OF STOMACH AND DUODENUM: ICD-10-CM

## 2023-02-27 ENCOUNTER — EVALUATION (OUTPATIENT)
Dept: PHYSICAL THERAPY | Facility: REHABILITATION | Age: 83
End: 2023-02-27

## 2023-02-27 DIAGNOSIS — R26.9 GAIT DISTURBANCE: Primary | ICD-10-CM

## 2023-02-27 NOTE — PROGRESS NOTES
PT Evaluation     Today's date: 2023  Patient name: Jeannette Alvarado  : 1940  MRN: [id number]  Referring provider: Praveen Lane DO  Dx:   Encounter Diagnosis     ICD-10-CM    1  Gait disturbance  R26 9           Start Time:   Stop Time: 92  Total time in clinic (min): 45 minutes    Assessment  Assessment details: Problem List:  1) decreased LE strength  2) balance difficulties    Jeannette Alvarado is a pleasant 81 y o  female who presents with balance and LE strength difficulties  she has weakness of her LE, decreased balance, and diminished cardiovascular endurance resulting in concern at no signs of improvement and fear of not being able to keep active  No further referral appears necessary at this time based upon examination results  I expect she will improve in 6-8 weeks  Positive prognostic indicators include positive attitude toward recovery and good understanding of diagnosis and treatment plan options  Negative prognostic indicators include chronicity of symptoms  Shelli would benefit from skilled physical therapy to address her limitations to address her balance and strength difficulties  Shelli will be joining the fitness program to work on her strength and balance  Went through exercises including warm up and cool down to ensure understanding  All questions answered to pt satisfaction  Impairments: abnormal or restricted ROM, activity intolerance, impaired physical strength, lacks appropriate home exercise program, pain with function, weight-bearing intolerance and poor posture     Symptom irritability: lowUnderstanding of Dx/Px/POC: good   Prognosis: good    Goals  ST-4 weeks  Patient will be independent with home exercise program    Patient will be able to manage symptoms independently    Patient will decrease pain by 25-50%    LTG: by discharge  Patient will improve FOTO to goal  Patient will report minimal (1-2/10) pain with aggravating activities to display improvements in overall functional status      Plan  Patient would benefit from: skilled physical therapy  Planned modality interventions: cryotherapy, thermotherapy: hydrocollator packs and unattended electrical stimulation  Planned therapy interventions: IADL retraining, joint mobilization, manual therapy, massage, ADL training, activity modification, abdominal trunk stabilization, ADL retraining, balance, balance/weight bearing training, neuromuscular re-education, body mechanics training, behavior modification, strengthening, stretching, therapeutic activities, therapeutic exercise, therapeutic training, transfer training, graded exercise, graded motor, home exercise program, graded activity, gait training, functional ROM exercises, patient education, postural training and flexibility  Frequency: 2x week  Duration in visits: 16  Duration in weeks: 8  Treatment plan discussed with: patient        Subjective Evaluation    History of Present Illness  Mechanism of injury: Darin Del Cid is a 81 y o  female presenting to physical therapy on 23 with referral from MD for gait and balance disturbance  Pt reports she has difficulty getting in and out of chairs and feels she is unsteady at times  Would like to join the fitness program so she can come and work on her strength and walking           Quality of life: good    Pain  Location: low back  Quality: dull ache    Patient Goals  Patient goals for therapy: increased strength, independence with ADLs/IADLs, return to sport/leisure activities, improved balance and increased motion          Objective    GAIT  6MWT: no AD, 840ft, no breaks  5xSTS: 15 04 seconds, used hands for standing/sitting  TU 50 seconds, used hands for standing and sitting, no AD       Precautions: standard    Manuals                                                                 Neuro Re-Ed                                                                                                        Ther Ex 2/27            STS             VG             Leg ext             balance                                                    Education/fitness 15'            Ther Activity                                       Gait Training                                       Modalities

## 2023-02-27 NOTE — LETTER
2023    Katrin Cummings DO  [address]  [address]  [place] [address] [address]    Patient: Daphne Henson   YOB: 1940   Date of Visit: 2023     Encounter Diagnosis     ICD-10-CM    1  Gait disturbance  R26 9           Dear Dr Rodger Perales: Thank you for your recent referral of Daphne Henson  Please review the attached evaluation summary from [facility] recent visit  Please verify that you agree with the plan of care by signing the attached order  If you have any questions or concerns, please do not hesitate to call  I sincerely appreciate the opportunity to share in the care of one of your patients and hope to have another opportunity to work with you in the near future  Sincerely,    Miranda Herron      Referring Provider:      I certify that I have read the below Plan of Care and certify the need for these services furnished under this plan of treatment while under my care  Katrin Cummings DO  [address]  [address]  [place] [address] [address]  Via Fax: [phone]          PT Evaluation     Today's date: 2023  Patient name: Daphne Henson  : 1940  MRN: [id number]  Referring provider: Dwayne Cadet DO  Dx:   Encounter Diagnosis     ICD-10-CM    1  Gait disturbance  R26 9           Start Time: 3242  Stop Time: 0524  Total time in clinic (min): 45 minutes    Assessment  Assessment details: Problem List:  1) decreased LE strength  2) balance difficulties    Daphne Henson is a pleasant 81 y o  female who presents with balance and LE strength difficulties  she has weakness of her LE, decreased balance, and diminished cardiovascular endurance resulting in concern at no signs of improvement and fear of not being able to keep active  No further referral appears necessary at this time based upon examination results  I expect she will improve in 6-8 weeks    Positive prognostic indicators include positive attitude toward recovery and good understanding of diagnosis and treatment plan options  Negative prognostic indicators include chronicity of symptoms  Everardo Staff would benefit from skilled physical therapy to address her limitations to address her balance and strength difficulties  Everardo Staff will be joining the fitness program to work on her strength and balance  Went through exercises including warm up and cool down to ensure understanding  All questions answered to pt satisfaction  Impairments: abnormal or restricted ROM, activity intolerance, impaired physical strength, lacks appropriate home exercise program, pain with function, weight-bearing intolerance and poor posture     Symptom irritability: lowUnderstanding of Dx/Px/POC: good   Prognosis: good    Goals  ST-4 weeks  Patient will be independent with home exercise program    Patient will be able to manage symptoms independently    Patient will decrease pain by 25-50%    LTG: by discharge  Patient will improve FOTO to goal  Patient will report minimal (1-2/10) pain with aggravating activities to display improvements in overall functional status      Plan  Patient would benefit from: skilled physical therapy  Planned modality interventions: cryotherapy, thermotherapy: hydrocollator packs and unattended electrical stimulation  Planned therapy interventions: IADL retraining, joint mobilization, manual therapy, massage, ADL training, activity modification, abdominal trunk stabilization, ADL retraining, balance, balance/weight bearing training, neuromuscular re-education, body mechanics training, behavior modification, strengthening, stretching, therapeutic activities, therapeutic exercise, therapeutic training, transfer training, graded exercise, graded motor, home exercise program, graded activity, gait training, functional ROM exercises, patient education, postural training and flexibility  Frequency: 2x week  Duration in visits: 16  Duration in weeks: 8  Treatment plan discussed with: patient        Subjective Evaluation    History of Present Illness  Mechanism of injury: Jayme Blount is a 81 y o  female presenting to physical therapy on 23 with referral from MD for gait and balance disturbance  Pt reports she has difficulty getting in and out of chairs and feels she is unsteady at times  Would like to join the fitness program so she can come and work on her strength and walking           Quality of life: good    Pain  Location: low back  Quality: dull ache    Patient Goals  Patient goals for therapy: increased strength, independence with ADLs/IADLs, return to sport/leisure activities, improved balance and increased motion          Objective    GAIT  6MWT: no AD, 840ft, no breaks  5xSTS: 15 04 seconds, used hands for standing/sitting  TU 50 seconds, used hands for standing and sitting, no AD      Precautions: standard    Manuals                                                                 Neuro Re-Ed                                                                                                        Ther Ex             STS             VG             Leg ext             balance                                                    Education/fitness 15'            Ther Activity                                       Gait Training                                       Modalities

## 2023-09-25 ENCOUNTER — TELEPHONE (OUTPATIENT)
Age: 83
End: 2023-09-25

## 2023-09-25 NOTE — TELEPHONE ENCOUNTER
Geovany Garcia [address], [address] [facility], [facility]    [phone]    Telephone Intake: Geriatric Assessment     - Chart Review  1. Has this patient been seen by our department in the last 3 years? No  2. Please route to provider for chart review prior to scheduling and let the caller know that this phone intake will be reviewed IF -  • Pt was recently hospitalized  • Pt is prescribed medications for behavior management or has a history of psychiatric hospitalization  • Pt plans to attend alone    Referral source: Mauro Clark who is scheduling/relationship to pt: Self   Caller's phone number: [phone]    Reason for referral: Patient concerns  and Family member concerns regarding memory concerns and home safety concerns. If there are behavioral concerns, is the pt prescribed medications to manage these? Yes, Zoloft    If so, how many? one   Has the patient ever had an inpatient psychiatric hospitalization? No,   What is the goal of the visit? Initial Assessment & Diagnosis        Has the patient been seen by a Neurologist or Geriatrician? No   If yes, is this appointment for a second opinion? No  Has the patient ever been diagnosed with dementia? No  Has the patient had an MRI NeuroQuant within the last 1 year? No (If so, please route to provider to determine if assessment + conference are needed or if only assessment should be scheduled)      Preferred language? English   Highest education level? 11 grade  Does the patient wear glasses? Yes   Does the patient use hearing aids? No     Is there a living will/healthcare POA in place/If so, who? Yes, Gerhardt Polio, Daughter    Does the pt/caregiver have access for a virtual visit (computer/smart phone with audio/video)? No    Caller was informed:   • Please make sure the pt is accompanied by someone who knows them well / caregiver / family member to participate in this appointment.    Who will accompany the pt (name and relationship)? Ld Winn, daughter  Phone number of person accompanying pt: [phone]  • Please make sure the pt attends all appointments, including the assessment, care conference, follow-up, whether in-person or virtual.  • For virtual visits, pt must be physically present in [facility]. Office packet mailed out to: [address] [place] [address]  Added to wait list for sooner appointments/notified that calls can be short notice (same day/day prior)?  Yes

## 2023-10-31 NOTE — SOCIAL WORK
Continued Stay SW/CM Assessment/Plan of Care Note     Progress note:  Per morning rounds, pt may potentially DC today, will need walk test first. RN said eliquis is $325 a month. Pt has straight medicaid, LIZETH called [facility], said $ is that high because pt hasn't met deductible yet. Once she does, usually copay is $47 but it is not possible to confirm that until pt does meet deductible. 30 day trial card can be applied. Epic chat to bedside RN to make aware.    SW to follow for home 02 orders.    10:32 AM  Met w pt and family at bedside re: eliquis co-pay. Informed pt of the information above, fam is ok with it. LIZETH told pt they would ask bedside RN if we can DC pt with 30 day supply, so they can use the 30 day free trial and will only have to pay $325 once in the new year. Epic chat to bedside RN informing and asking to f/w w pt. Pt agreeable to home 02 if recommended by respiratory therapist, however pt is on room air currently.    2:54 PM  Per RT, pt does not need home 02. [facility] reponse via [facility] that pt good to go for [facility], LIZETH notified [facility] via [facility] pt to DC today. Added to AVS.      See SW/CM flowsheets for other objective data.    Disposition Recommendations:  Preliminary discharge destination:    SW/CM recommendation for discharge: Home care    Discharge Plan/Needs:     Continued Care and Services - Admitted Since 10/25/2023    Coordination has not been started for this encounter.           Devices/ Equipment that need to be arranged for discharge:     Accepted   Pending insurance authorization   Others:    Anticipated date of DME availability:     Prior To Hospitalization:    Living Situation: Spouse, Adult children and residing at House    .  Support Systems: Family members   Home Devices/Equipment: Blood pressure monitor, Blood glucose monitor, Pulse Oximeter (T)            Mobility Assist Devices: None   Type of Service Prior to Hospitalization: None               Patient/Family discharge goal (s):  Home,  IMM reviewed with patient and daughter at bedside, signed by CM and placed in medical records bin  Copy provided to patient for her records  CM verified if patient or daughter had any concerns or questions regarding DC, patient and daughter reports no concerns  CM will continue to follow for potential needs  Home Care     Resources provided:           Therapy Recommendations for Discharge:   PT:      Last Filed Values       Value Time User    PT Discharge Needs  therapy 1-3 times per week 10/27/2023 11:58 AM Heather Samano, PT        OT:       Last Filed Values       Value Time User    OT Discharge Needs  therapy 1-3 times per week 10/27/2023  2:38 PM Jovani Banks OTR/L        SLP:    Last Filed Values     None          Mobility Equipment Recommended for Discharge: Possible RW      Barriers to Discharge  Identified Barriers to Discharge/Transition Planning:

## 2023-12-11 ENCOUNTER — OFFICE VISIT (OUTPATIENT)
Age: 83
End: 2023-12-11

## 2023-12-11 VITALS
SYSTOLIC BLOOD PRESSURE: 134 MMHG | DIASTOLIC BLOOD PRESSURE: 82 MMHG | BODY MASS INDEX: 27.19 KG/M2 | OXYGEN SATURATION: 96 % | HEIGHT: 65 IN | TEMPERATURE: 97.1 F | HEART RATE: 64 BPM | WEIGHT: 163.2 LBS

## 2023-12-11 DIAGNOSIS — N18.31 STAGE 3A CHRONIC KIDNEY DISEASE (HCC): ICD-10-CM

## 2023-12-11 DIAGNOSIS — C64.2 RENAL CELL CANCER, LEFT (HCC): ICD-10-CM

## 2023-12-11 DIAGNOSIS — I48.0 PAROXYSMAL ATRIAL FIBRILLATION (HCC): ICD-10-CM

## 2023-12-11 DIAGNOSIS — M19.079 ANKLE ARTHRITIS: ICD-10-CM

## 2023-12-11 DIAGNOSIS — M81.0 OSTEOPOROSIS, UNSPECIFIED OSTEOPOROSIS TYPE, UNSPECIFIED PATHOLOGICAL FRACTURE PRESENCE: ICD-10-CM

## 2023-12-11 DIAGNOSIS — R41.3 MEMORY LOSS: Primary | ICD-10-CM

## 2023-12-11 DIAGNOSIS — F33.8 SEASONAL AFFECTIVE DISORDER (HCC): ICD-10-CM

## 2023-12-11 PROBLEM — R41.89 COGNITIVE IMPAIRMENT: Status: ACTIVE | Noted: 2023-12-11

## 2023-12-11 PROBLEM — M19.90 OSTEOARTHROSIS: Status: ACTIVE | Noted: 2023-12-11

## 2023-12-11 PROCEDURE — 99205 OFFICE O/P NEW HI 60 MIN: CPT | Performed by: INTERNAL MEDICINE

## 2023-12-11 RX ORDER — TURMERIC/TURMERIC EXT/PEPR EXT 900-100 MG
1000 CAPSULE ORAL DAILY
COMMUNITY

## 2023-12-11 RX ORDER — ASCORBATE CALCIUM 500 MG
500 TABLET ORAL DAILY
COMMUNITY

## 2023-12-11 RX ORDER — OMEGA-3-ACID ETHYL ESTERS 1 G/1
1 CAPSULE, LIQUID FILLED ORAL DAILY
COMMUNITY

## 2023-12-11 RX ORDER — ACETAMINOPHEN 500 MG
500 TABLET ORAL AS NEEDED
COMMUNITY

## 2023-12-11 NOTE — PROGRESS NOTES
Sejal Powers [address], [address] [facility], [address]  [phone]    Social Work Intake    Lisbeth Zuluaga presents today for a geriatric assessment, accompanied by -Moses, daughter-Loretta. Social/Family History   Marital status:                                        Spouse's Name:  Wes Amador     Does patient have children? Yes How Many? 4 daughters  (If yes, where do the children live?) 3 live on the same street, 1 is near reading  Family members assisting patient at home: No   Are any relationships causing problems right now: No   Who is available to provide care in case of illness or crisis (please specify relation to patient / level of care able to provide)? [place] POA      Employment and Education   Education: Highest Level of Education: 11th grade    Currently Employed: No    Retired:  didn't work    Type of employment: Homemaker  [place]: No       [address] [facility] and Organizations: [employer], get together with their high school class   Major life events in past two years (ex: [facility], death in family, major illness etc.): kidney cancer, kidney removed    Have you ever used a home care agency, meal delivery service, or respite care?: daughters provided meals after surgery      End-Of-Life Checklist   Have wishes or desires for end-of-life care been discussed? Advanced directives: living will & POA        For all patients, we encourage seeing a  to establish a Financial Power of , a [address], and an Advanced Directive (living will). Particularly for patients experiencing memory concerns, we strongly recommend that this is completed as soon as possible. Safety Assessment   Is the patient still driving? Yes     Is the patient taking medications as prescribed? Yes     Is there a system in place for medication management? pillbox  Are firearms or weapons in the home?  [place] reilly  Recommendations per [employer]: removing them from the home, keep ammunition stored separately, encourage patient to consider "gifting" them, if necessary, ask local law enforcement for assistance in removing the firearms from the home. The family may receive compensation from a gun buy-back program.    Does the patient live alone? Lives with   What is the layout of the home? 1 level home, steps to enter multiple rooms  Do you feel comfortable leaving the person home alone? Yes    Have you noticed any burned pans or other signs of issues with the stove or other appliances? No   Do you have any concerns about the person’s cooking or eating habits? No   Are there working smoke detectors and fire extinguishers in the home? Yes    Have you thought about when it will no longer be safe for the patient to live alone? Remain in the home       [place] Cognitive Assessment (MoCA) Version 8.1  Education: 11th grade    Points Earned POSSIBLE Points   Visuospatial/Executive   Alternating Danielson Making 1 1   Visuoconstructional skills 1 1   Visuoconstructional skills (clock) 3 3   Naming   Naming Animals 3 3   Attention   Digit Span 2 2   Vigilance (letters) 1 1   Serial 7 subtraction 3 3   Language   Sentence Repetition 1 2   Verbal fluency 1 1   Abstraction   Abstraction (word pairings) 2 2   Delayed recall   Delayed recall 4 5   Memory index score: 13/15   Orientation   Orientation 5 6   TOTAL SCORE: 28/30  (Normal ?26/30)   Additional notes:        Geriatric Depression Scale (GDS) completed today, 1/15.

## 2023-12-11 NOTE — PROGRESS NOTES
[facility]  Consultation  [address] [facility], [address] [facility], [facility] [address] [address]   [phone]      NAME: Rosana Barcenas  AGE: 81 y.o.  SEX: female    DATE OF ENCOUNTER: 12/11/2023    Assessment and Plan     Memory loss  -Patient is here to get the initial assessment of her memory/forgetfulness  -MOCA 28/30 (lost 1 point in language, 1 in delayed recall)  -TUG 11 seconds  -GDS 1/15  -Will get Neurotrax to complete the assessment on 1/4/2024  -Remain active physically, mentally, and socially  -Pharmacologic and nonpharmacologic management will be discussed at care conference  -Recommend doing mentally challenging tasks such as crossword's or puzzles  -Patient is independent with ADLs and IADLs  -Patient drives, no driving safety concerns  -Patient has good family support  -Patient currently takes turmeric supplement, vitamin C, cranberry supplement, menaquinone-7, and omega-3  -Maintain chronic conditions and continue following with PCP  -Family care conference in 2-4 weeks to discuss diagnosis, management and prognosis    Paroxysmal atrial fibrillation ([address])  -Continue taking Eliquis 5 mg twice daily  -Managed by cardiologist    Renal cell cancer, left ([address])  -S/p left robot nephrectomy 9/4/2020   -Continue to follow-up with urology and PCP    Osteoporosis, unspecified osteoporosis type, unspecified pathological fracture presence  -Not on any medication currently    Seasonal affective disorder ([address])  -GDS 1/15   -Continue Zoloft 50 mg daily    Ankle arthritis  -Continue physical activity as tolerated  -Recommend Tylenol 650 mg every 8 hours as needed for pain    Stage 3a chronic kidney disease ([address])  -Avoid nephrotoxin and hypotension  -Maintain adequate hydration    Chief Complaint     Chief Complaint   Patient presents with    Geriatric Evaluation     New patient      Patient is here for a geriatric assessment with memory issues as a concern     History of Present Illness     We had the pleasure of evaluating Estrellita Jay who is a 81 y.o. female in Geriatric today, accompanied by her daughter Rio Love). Patient lives on her own with her spouse. She has a big family around. She has 4 daughters, 8 grandchildren, 25 great grandchildren  Patient has occasional mild memory loss such as forgetting some names, but nothing major. She does not have issues with finding words. She does not wander and no history of visual or auditory hallucinations. She is independent with ADLs and iADLs. She still drives and does not get citations or get into accidents. She has pain in her ankles and heels bilaterally so physically activity is limited; however, she still can climb the stairs and walks without a cane or a walker. She tripped on the staircase and caught herself. She is being careful with ambulation. She manages bills/finance together with her . She has chronic mild depression which is well controlled with Sertraline. Reports fair appetite and no trouble sleeping. She has left nephrectomy few years ago. Patient has a living will. POA will be Rio Love (daughter) and Luz Curiel (son-in-law)    Fam Hx: mother had stroke and passed away at 79 yo    The following portions of the patient's history were reviewed and updated as appropriate: allergies, current medications, past family history, past medical history, past social history, past surgical history and problem list.    Review of Systems     Review of Systems   Constitutional:  Negative for chills and fever. HENT:  Negative for ear pain and sore throat. Eyes:  Negative for pain and visual disturbance. Respiratory:  Negative for cough and shortness of breath. Cardiovascular:  Negative for chest pain and palpitations. Gastrointestinal:  Negative for abdominal pain and vomiting. Genitourinary:  Negative for dysuria and hematuria. Musculoskeletal:  Negative for arthralgias and back pain. Skin:  Negative for color change and rash.    Neurological: Negative for seizures and syncope. All other systems reviewed and are negative. Objective     /82 (BP Location: Left arm, Patient Position: Sitting, Cuff Size: Standard)   Pulse 64   Temp (!) 97.1 °F (36.2 °C) (Temporal)   Ht 5' 5" (1.651 m)   Wt 74 kg (163 lb 3.2 oz)   SpO2 96%   BMI 27.16 kg/m²     Physical Exam  Constitutional:       General: She is not in acute distress. HENT:      Head: Normocephalic and atraumatic. Nose: Nose normal.      Mouth/Throat:      Mouth: Mucous membranes are moist.      Pharynx: Oropharynx is clear. No oropharyngeal exudate or posterior oropharyngeal erythema. Eyes:      Extraocular Movements: Extraocular movements intact. Pupils: Pupils are equal, round, and reactive to light. Cardiovascular:      Rate and Rhythm: Normal rate. Pulses: Normal pulses. Pulmonary:      Effort: Pulmonary effort is normal. No respiratory distress. Musculoskeletal:      Cervical back: Normal range of motion. Skin:     General: Skin is warm and dry. Neurological:      General: No focal deficit present. Mental Status: She is alert. Comments: MOCA 28/30 (lost 1 point in language, 1 in delayed recall)  TUG 11 seconds  GDS 1/15   Psychiatric:         Mood and Affect: Mood normal.       Pertinent Laboratory/Diagnostic Studies:  I have reviewed all the lab results.   Blood work from 10/20/2023 including BMP: GFR 48  Work from 2023: CBC: Hb 12.9, TSH 2.07    Name: Abraham Santos  : 1940  MRN: [id number]  DOS: 2023    Katie Moreno  Neurology Resident PGY III    I have spent a total time of 60 minutes on 23 in caring for this patient including Prognosis, Risks and benefits of tx options, Instructions for management, Patient and family education, Importance of tx compliance, Risk factor reductions, Impressions, Counseling / Coordination of care, Documenting in the medical record, Reviewing / ordering tests, medicine, procedures  , and Obtaining or reviewing history  .

## 2024-01-03 ENCOUNTER — APPOINTMENT (OUTPATIENT)
Dept: PHYSICAL THERAPY | Facility: REHABILITATION | Age: 84
End: 2024-01-03
Payer: MEDICARE

## 2024-01-08 ENCOUNTER — OFFICE VISIT (OUTPATIENT)
Dept: PHYSICAL THERAPY | Facility: REHABILITATION | Age: 84
End: 2024-01-08
Payer: MEDICARE

## 2024-01-08 DIAGNOSIS — G89.29 CHRONIC BILATERAL LOW BACK PAIN WITHOUT SCIATICA: Primary | ICD-10-CM

## 2024-01-08 DIAGNOSIS — M54.50 CHRONIC BILATERAL LOW BACK PAIN WITHOUT SCIATICA: Primary | ICD-10-CM

## 2024-01-08 PROCEDURE — 97161 PT EVAL LOW COMPLEX 20 MIN: CPT | Performed by: PHYSICAL THERAPIST

## 2024-01-08 PROCEDURE — 97110 THERAPEUTIC EXERCISES: CPT | Performed by: PHYSICAL THERAPIST

## 2024-01-08 NOTE — PROGRESS NOTES
PT Evaluation     Today's date: 2024  Patient name: Gail Bradshaw  : 1940  MRN: [id number]  Referring provider: Huan Roca DO  Dx:   Encounter Diagnosis     ICD-10-CM    1. Chronic bilateral low back pain without sciatica  M54.50     G89.29           Start Time: 1600  Stop Time: 1630  Total time in clinic (min): 30 minutes    Assessment  Assessment details: Gail Bradshaw is a pleasant 83 y.o. female who presents with low back and feet pain. She has weakness of bilateral hips and mild lumbar hypomobility. Vivi would benefit from skilled physical therapy to address her limitations and allow her to return to her PLOF.     She would like to rejoin the fitness program so she can start exercising regularly.        Impairments: abnormal or restricted ROM, activity intolerance, impaired physical strength, lacks appropriate home exercise program, pain with function, weight-bearing intolerance and poor posture     Symptom irritability: lowUnderstanding of Dx/Px/POC: good   Prognosis: good    Goals  ST-4 weeks  Patient will be independent with home exercise program.   Patient will be able to manage symptoms independently.  Patient will decrease pain by 25-50%    LTG: by discharge  Patient will improve FOTO to goal  Patient will report minimal (1-2/10) pain with aggravating activities to display improvements in overall functional status        Plan  Patient would benefit from: skilled physical therapy  Planned modality interventions: cryotherapy, thermotherapy: hydrocollator packs and unattended electrical stimulation  Planned therapy interventions: IADL retraining, joint mobilization, manual therapy, massage, ADL training, activity modification, abdominal trunk stabilization, ADL retraining, balance, balance/weight bearing training, neuromuscular re-education, body mechanics training, behavior modification, strengthening, stretching, therapeutic activities, therapeutic exercise, therapeutic  training, transfer training, graded exercise, graded motor, home exercise program, graded activity, gait training, functional ROM exercises, patient education, postural training, IASTM, kinesiology taping and flexibility  Frequency: 2x week  Duration in visits: 16  Duration in weeks: 8  Treatment plan discussed with: patient        Subjective Evaluation    History of Present Illness  Mechanism of injury: Gali is a 83 y.o. female presenting to physical therapy on 24 with referral from MD for low back and feet pain. Would like to join the fitness program so she can get moving more.           Quality of life: good    Patient Goals  Patient goals for therapy: increased strength, independence with ADLs/IADLs, return to sport/leisure activities, decreased pain and increased motion    Pain  Current pain ratin  At best pain ratin  At worst pain ratin          Objective    Myotomes:  all intact b/l  Dermatome: all intact b/l        MMT         AROM          PROM    Hip       L       R        L           R      L     R   Flex.     [facility] WFL   Extn.     L WFL   Abd.     [facility] WFL   Add.     [facility]   IR.     [facility]   ER.     [facility]            G. Max         G. Med.         Iliop.               Precautions: osteoporosis, CKD    Manuals                                                                 Neuro Re-Ed                                                                                                        Ther Ex                                                                                                        Education 10'            Ther Activity                                       Gait Training                                       Modalities

## 2024-01-08 NOTE — PROGRESS NOTES
[facility]  [address], [address]  [place] [address]  [phone]    Virtual Care Conference    NAME: Gail Bradshaw  AGE: 83 y.o. SEX: female  YOB: 1940  DATE OF ASSESSMENT: 12/11/23  DATE OF CONFERENCE: 1/15/24    Family Present: Daughter (Loretta)  Staff Present: Trenton Baez MD     Patient / Family Goals of Care: Review cognitive decline and associated symptoms, discuss treatment options and care planning.     Medical Concerns (Current/Historical): Paroxysmal atrial fibrillation, renal cell cancer, left, osteoporosis, unspecified osteoporosis type, unspecified pathological fracture presence, seasonal affective disorder, ankle arthritis, stage 3a chronic kidney disease    Geriatric Syndromes/Age Related Syndromes: forgetfulness    Neuropsychological  Forgetfulness   [place] Cognitive Assessment: 28/30  Geriatric Depression Screen: 1/15  NeuroTrax (scheduled 1/10)  107.5 (global cognitive score)  97.2 (memory)   100.9 (executive function)  109.4 (attention)   118 (information processing speed)  109.8 (visual spatial)  113.8 (verbal function)  103.7 (motor skills)    Remain active physically, mentally and socially  Pharmaceutical and non-pharmaceutical interventions discussed  Engage in cognitively challenging exercises such as crosswords and puzzles  Maintain chronic conditions under control and continue following with PCP  Driving Safety: No concerns  Patient has good family support  Decision-making: At this time of the visit, it is my opinion that the patient is able to make his own medical decisions.  Caregiver support group info provided   Patient currently takes turmeric supplement, vitamin C, cranberry supplement, menaquinone-7, and omega-3  Repeat cognitive assessment in 6 months    Diagnostic Studies  Blood work from 10/20/2023 reviewed, BMP: GFR 48  TSH from 6/22/2023 was 2.07  CBC from 6/22/2023: Hb 12.9    Physical Finding Impacting  Function   Timed Up and Go Test: 11 seconds   Fall Risk: Moderate   Activities of Daily Living: Independent   Instrumental Activities of Daily Living: Independent    Encourage appropriate footwear at all times  Review fall risk prevention tips and adjust within the home environment as needed    Medications Reviewed   Medications seem appropriate for present conditions  Check with PCP before using over the counter medications  Avoid over the counter medications that can affect cognition (e.g., Benadryl, Tylenol PM)   Avoid NSAIDs due to risk of GI bleed and renal impairment    Other Findings   Overall health  BMI: 27.16 kg/m2  Maintain well-balanced diet  Continue following with primary care physician regularly  Paroxysmal atrial fibrillation  Continue taking Eliquis 5 mg twice daily  Managed by cardiologist  Renal cell cancer, left  S/p left robot nephrectomy 9/4/2020   Continue to follow-up with urology and PCP   Osteoporosis, unspecified osteoporosis type, unspecified pathological fracture presence  Not on any medication currently  Seasonal affective disorder  GDS 1/15  Continue Zoloft 50mg daily  Ankle arthritis  Continue physical activity as tolerated  Recommend Tylenol 650 mg every 8 hours as needed for pain  Stage 3a chronic kidney disease  Avoid nephrotoxin and hypotension  Maintain adequate hydration    Recommended Health Maintenance   Immunizations, if not contraindicated:   Influenza vaccine yearly  Pneumo vaccine every 5 years (65 years and over)  Shingles vaccine  COVID-19 vaccine    Social / Safety Considerations  Consider a [facility] for positive socialization, physical exercise, cognitive stimulation  Stay in touch with family and friends  Plan self-care activities for your mental well-being each week  Consider volunteering through [employer] ([phone]) or [employer]  Recommend review of fall risk prevention tips  Recommend use of fall precautions including fall alert  device  Consider assistance for medication administration such as blister packaging or use of an automated pill dispenser    Long Term Care Issues/advance care planning   Patient has POA and a Living Will  Maintain updated advance directives and provide a copy to your primary care provider  Educational information provided    Patient and family verbalized understanding of above care plan.    For care coordination purposes, this care plan will be shared with your primary care provider. With any questions, please contact our office at [phone].     Virtual Regular Visit    Verification of patient location:    Patient is located at Home in the following state in which I hold an active license [place]    Reason for visit is   Chief Complaint   Patient presents with    Virtual Regular Visit     Care COnference    Virtual Regular Visit     Virtual visit for family conference to discuss results and recommendations for memory loss.       Encounter provider Trenton Baez MD    Provider located at [address] [address] [phone]      Recent Visits  Date Type Provider Dept   01/10/24 Office Visit Trenton Baez MD [facility]   Showing recent visits within past 7 days and meeting all other requirements  Today's Visits  Date Type Provider Dept   01/15/24 Telemedicine Trenton Baez MD [facility]   Showing today's visits and meeting all other requirements  Future Appointments  No visits were found meeting these conditions.  Showing future appointments within next 150 days and meeting all other requirements       The patient was identified by name and date of birth. Gail Bradshaw was informed that this is a telemedicine visit and that the visit is being conducted through the Epic Embedded platform. She agrees to proceed..  My office door was closed. No one else was in the room.  She  acknowledged consent and understanding of privacy and security of the video platform. The patient has agreed to participate and understands they can discontinue the visit at any time.    Patient is aware this is a billable service.     Subjective    HPI     Gail Bradshaw is a 83 y.o. y.o. female who was seen virtually for family conference to review memory loss and associated symptoms, discuss treatment options and care planning.Patient's daughter was also in attendance. Patient reports feeling well and denies any medication changes since last visit. Patient denies any recent falls, hospitalization or any other acute complaints.    The following portions of the patient's history were reviewed and updated as appropriate: allergies, current medications, past family history, past medical history, past social history, past surgical history and problem list.     Review of Systems    Constitutional: Negative for activity change.   HENT: Negative for hearing loss, trouble swallowing.    Eyes: Negative for visual disturbance.   Respiratory: Negative for choking and shortness of breath.    Gastrointestinal: Negative for nausea.   Genitourinary: Negative for dysuria and frequency.   Musculoskeletal: Negative for back pain and neck pain.   Neurological: Negative for dizziness, facial asymmetry, speech difficulty, weakness and light-headedness.   Psychiatric/Behavioral: Negative for behavioral problems and confusion.      Video Exam    There were no vitals filed for this visit.    Physical Exam   Constitutional: not in acute distress. Normal appearance. Not ill-appearing, toxic-appearing or diaphoretic.   HENT: Normocephalic and atraumatic. External ear normal b/l. Nose normal. No congestion or rhinorrhea.   Eyes:  EOMI. No scleral icterus.  No discharge.  Conjunctivae normal.  Neck: No tracheal deviation present.   Pulmonary/Chest: Effort normal. No respiratory distress.  Musculoskeletal: Sitting comfortably in a chair    Neurological: Patient is alert. Facial expression roughly symmetric, no dysarthria,  Skin: No pallor.   Psychiatric: Patient has a normal mood and affect; behavior is normal.

## 2024-01-10 ENCOUNTER — OFFICE VISIT (OUTPATIENT)
Age: 84
End: 2024-01-10
Payer: MEDICARE

## 2024-01-10 DIAGNOSIS — R41.89 COGNITIVE IMPAIRMENT: Primary | ICD-10-CM

## 2024-01-10 PROCEDURE — 96139 PSYCL/NRPSYC TST TECH EA: CPT | Performed by: INTERNAL MEDICINE

## 2024-01-10 PROCEDURE — 96138 PSYCL/NRPSYC TECH 1ST: CPT | Performed by: INTERNAL MEDICINE

## 2024-01-10 NOTE — PROGRESS NOTES
[address]  [facility] [address]  [phone]    Gail Augustine was here today for Neurotrax comprehensive test. It took the patient 53 minutes to complete.

## 2024-01-14 PROBLEM — R68.89 FORGETFULNESS: Status: ACTIVE | Noted: 2024-01-14

## 2024-01-15 ENCOUNTER — TELEPHONE (OUTPATIENT)
Age: 84
End: 2024-01-15

## 2024-01-15 ENCOUNTER — TELEMEDICINE (OUTPATIENT)
Age: 84
End: 2024-01-15
Payer: MEDICARE

## 2024-01-15 DIAGNOSIS — Z71.89 ADVANCED CARE PLANNING/COUNSELING DISCUSSION: ICD-10-CM

## 2024-01-15 DIAGNOSIS — I48.0 PAROXYSMAL ATRIAL FIBRILLATION (HCC): ICD-10-CM

## 2024-01-15 DIAGNOSIS — M81.0 AGE RELATED OSTEOPOROSIS, UNSPECIFIED PATHOLOGICAL FRACTURE PRESENCE: ICD-10-CM

## 2024-01-15 DIAGNOSIS — F33.8 SEASONAL AFFECTIVE DISORDER (HCC): ICD-10-CM

## 2024-01-15 DIAGNOSIS — N18.31 STAGE 3A CHRONIC KIDNEY DISEASE (HCC): ICD-10-CM

## 2024-01-15 DIAGNOSIS — R68.89 FORGETFULNESS: Primary | ICD-10-CM

## 2024-01-15 DIAGNOSIS — C64.2 RENAL CELL CANCER, LEFT (HCC): ICD-10-CM

## 2024-01-15 DIAGNOSIS — M19.079 ANKLE ARTHRITIS: ICD-10-CM

## 2024-01-15 PROCEDURE — 99483 ASSMT & CARE PLN PT COG IMP: CPT | Performed by: INTERNAL MEDICINE

## 2024-01-15 NOTE — TELEPHONE ENCOUNTER
Called patient's daughter Loretta to schedule the appt after virtual visit but she said she will call back and schedule the appt accordingly.

## 2024-01-17 NOTE — PROGRESS NOTES
[facility]  [address], [address]  [place] [address] [phone]    Care Conference: Resources Provided    LSW participated in today's conference and provided the following resources, along with a copy of care plan:  [address] [place] [id number]    General Information  - 10 Ways to Love Your Brain    Safety  - CDC fall prevention / home safety checklist    Community Resources  - [facility] in [place] [facility] Resource Guide (contains information about higher levels of care, homecare, etc.)

## 2024-01-25 ENCOUNTER — OFFICE VISIT (OUTPATIENT)
Dept: FAMILY MEDICINE CLINIC | Facility: CLINIC | Age: 84
End: 2024-01-25
Payer: MEDICARE

## 2024-01-25 VITALS
HEIGHT: 65 IN | HEART RATE: 81 BPM | BODY MASS INDEX: 27.49 KG/M2 | OXYGEN SATURATION: 97 % | RESPIRATION RATE: 16 BRPM | TEMPERATURE: 97.5 F | SYSTOLIC BLOOD PRESSURE: 130 MMHG | WEIGHT: 165 LBS | DIASTOLIC BLOOD PRESSURE: 82 MMHG

## 2024-01-25 DIAGNOSIS — Z13.220 ENCOUNTER FOR SCREENING FOR LIPID DISORDER: ICD-10-CM

## 2024-01-25 DIAGNOSIS — Z00.00 MEDICARE ANNUAL WELLNESS VISIT, SUBSEQUENT: Primary | ICD-10-CM

## 2024-01-25 DIAGNOSIS — K21.9 CHRONIC GERD: ICD-10-CM

## 2024-01-25 DIAGNOSIS — R05.3 CHRONIC COUGH: ICD-10-CM

## 2024-01-25 DIAGNOSIS — E55.9 VITAMIN D DEFICIENCY: ICD-10-CM

## 2024-01-25 DIAGNOSIS — M19.079 ANKLE ARTHRITIS: ICD-10-CM

## 2024-01-25 DIAGNOSIS — F33.8 SEASONAL AFFECTIVE DISORDER (HCC): ICD-10-CM

## 2024-01-25 DIAGNOSIS — R68.89 FORGETFULNESS: ICD-10-CM

## 2024-01-25 DIAGNOSIS — C64.2 RENAL CELL CANCER, LEFT (HCC): ICD-10-CM

## 2024-01-25 DIAGNOSIS — I48.0 PAROXYSMAL ATRIAL FIBRILLATION (HCC): ICD-10-CM

## 2024-01-25 DIAGNOSIS — N18.31 STAGE 3A CHRONIC KIDNEY DISEASE (HCC): ICD-10-CM

## 2024-01-25 PROBLEM — B96.20 E COLI BACTEREMIA: Status: RESOLVED | Noted: 2020-06-30 | Resolved: 2024-01-25

## 2024-01-25 PROBLEM — N12 PYELONEPHRITIS: Status: RESOLVED | Noted: 2020-06-27 | Resolved: 2024-01-25

## 2024-01-25 PROBLEM — N28.89 RENAL MASS, LEFT: Status: RESOLVED | Noted: 2020-07-01 | Resolved: 2024-01-25

## 2024-01-25 PROBLEM — R78.81 E COLI BACTEREMIA: Status: RESOLVED | Noted: 2020-06-30 | Resolved: 2024-01-25

## 2024-01-25 PROBLEM — A41.51 SEPSIS DUE TO ESCHERICHIA COLI WITHOUT ACUTE ORGAN DYSFUNCTION (HCC): Status: RESOLVED | Noted: 2020-06-27 | Resolved: 2024-01-25

## 2024-01-25 PROBLEM — J18.9 BILATERAL PNEUMONIA: Status: RESOLVED | Noted: 2020-06-27 | Resolved: 2024-01-25

## 2024-01-25 PROBLEM — N15.1 RENAL ABSCESS, LEFT: Status: RESOLVED | Noted: 2020-06-27 | Resolved: 2024-01-25

## 2024-01-25 PROBLEM — R19.7 DIARRHEA: Status: RESOLVED | Noted: 2020-06-27 | Resolved: 2024-01-25

## 2024-01-25 PROCEDURE — G0439 PPPS, SUBSEQ VISIT: HCPCS | Performed by: FAMILY MEDICINE

## 2024-01-25 PROCEDURE — 99204 OFFICE O/P NEW MOD 45 MIN: CPT | Performed by: FAMILY MEDICINE

## 2024-01-25 RX ORDER — FAMOTIDINE 40 MG/1
40 TABLET, FILM COATED ORAL
Qty: 90 TABLET | Refills: 3 | Status: SHIPPED | OUTPATIENT
Start: 2024-01-25 | End: 2024-01-26 | Stop reason: SDUPTHER

## 2024-01-25 RX ORDER — OMEPRAZOLE 40 MG/1
40 CAPSULE, DELAYED RELEASE ORAL DAILY
COMMUNITY
Start: 2023-08-15

## 2024-01-25 RX ORDER — KETOCONAZOLE 20 MG/ML
SHAMPOO TOPICAL ONCE
COMMUNITY

## 2024-01-25 RX ORDER — MULTIVIT-MIN/IRON/FOLIC ACID/K 18-600-40
CAPSULE ORAL
COMMUNITY

## 2024-01-25 NOTE — ASSESSMENT & PLAN NOTE
Lab Results   Component Value Date    EGFR 87 07/01/2020    EGFR 84 06/30/2020    EGFR 86 06/29/2020    CREATININE 0.59 (L) 07/01/2020    CREATININE 0.67 06/30/2020    CREATININE 0.62 06/29/2020

## 2024-01-25 NOTE — ASSESSMENT & PLAN NOTE
Send swallowing study indicates esophageal dysmotility in mid esophagus.  Chest x-ray performed in January 2024 was clear.  Patient reports frequent episodes of cough associated with articulation or eating.  I suspect GERD as a primary etiology.  Recommend GI consult and pulmonary consult.  Start omeprazole 40 mg daily, continue famotidine 40 mg nightly

## 2024-01-25 NOTE — PATIENT INSTRUCTIONS

## 2024-01-25 NOTE — ASSESSMENT & PLAN NOTE
follows  S/p  left nephrectomy 2020  CT AP  No acute abnormality or evidence of recurrent or metastatic disease within the   chest abdomen or pelvis. Stable postsurgical changes status post left   nephrectomy. Right hepatic cyst. Colonic diverticulosis without acute   diverticulitis. Other findings as above.

## 2024-01-25 NOTE — PROGRESS NOTES
Assessment and Plan:     Problem List Items Addressed This Visit        Cardiovascular and Mediastinum    Paroxysmal atrial fibrillation (HCC)     No Rx for rate control, asymptomatic  Eliquis for anticoagulation  , cardiology, [facility]         Relevant Orders    TSH, 3rd generation       Musculoskeletal and Integument    Ankle arthritis     Chronic arthritic ankle pain.  Patient is not a candidate for NSAIDs or turmeric since she is on Eliquis.  She may use Tylenol arthritis.  She will follow-up with [facility] orthopedic surgery to discuss possible injections            Genitourinary    Renal cell cancer, left (HCC) (Chronic)      follows  S/p  left nephrectomy 2020  CT AP  No acute abnormality or evidence of recurrent or metastatic disease within the   chest abdomen or pelvis. Stable postsurgical changes status post left   nephrectomy. Right hepatic cyst. Colonic diverticulosis without acute   diverticulitis. Other findings as above.          Stage 3a chronic kidney disease (HCC)     Lab Results   Component Value Date    EGFR 87 07/01/2020    EGFR 84 06/30/2020    EGFR 86 06/29/2020    CREATININE 0.59 (L) 07/01/2020    CREATININE 0.67 06/30/2020    CREATININE 0.62 06/29/2020          Relevant Orders    CBC and differential    Comprehensive metabolic panel       Other    Seasonal affective disorder (HCC)     Doing  well on Zoloft 50 mg daily         Forgetfulness     MOCA 28/30 (lost 1 point in language, 1 in delayed recall)          Chronic cough     Send swallowing study indicates esophageal dysmotility in mid esophagus.  Chest x-ray performed in January 2024 was clear.  Patient reports frequent episodes of cough associated with articulation or eating.  I suspect GERD as a primary etiology.  Recommend GI consult and pulmonary consult.  Start omeprazole 40 mg daily, continue famotidine 40 mg nightly         Relevant Orders    Ambulatory Referral to Pulmonology   Other Visit Diagnoses     Medicare annual  "wellness visit, subsequent    -  Primary    Chronic GERD        Relevant Medications    omeprazole (PriLOSEC) 40 MG capsule    Other Relevant Orders    Ambulatory referral to Gastroenterology    Vitamin D deficiency        Relevant Orders    Vitamin D 25 hydroxy    Encounter for screening for lipid disorder        Relevant Orders    Lipid Panel with Direct LDL reflex      Return in about 4 months (around 6/5/2024) for follow up.       Preventive health issues were discussed with patient, and age appropriate screening tests were ordered as noted in patient's After Visit Summary.  Personalized health advice and appropriate referrals for health education or preventive services given if needed, as noted in patient's After Visit Summary.     History of Present Illness:     Patient presents for a Medicare Wellness Visit     New patient presents to establish care with our practice    Previous PCP-  [facility]     H/o  coughing uncontrollable episodes  - difficulty control, attacks,  Of often occurs with articulation or while eating  Last episode-  could not  catch her breath-was seen by Dr. Roca a few weeks ago.    ENT recent eval -diagnosed with \" irritable larynx\" [facility]  Swallow study  was normal as per patient.   Recent CXR 1/2024 - normal  SL swallowing study reviewed.  1. The swallowing act is unremarkable.   2. 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   3. No evidence of esophagitis or neoplasia.   4. No evidence of a hiatus hernia.     Arthritic foot pain.Used to f/up  with  and had injections with natural remedies.  Pain has been bothersome lately.    Echocardiogram July 2023: Ejection fraction 60 to 65%, left atrium was severely dilated, no significant valvular abnormalities    Recent evaluation by geriatrics by , geriatrics due to concern of memory.MOCA 28/30 (lost " 1 point in language, 1 in delayed recall)    UTD with pneumonia and shingles vaccines             Patient Care Team:  Vicky Wilson MD as PCP - General (Family Medicine)  Shruthi Ward as Care Manager (Care Coordination)     Review of Systems:     Review of Systems   Constitutional: Negative.    HENT:  Positive for postnasal drip.    Eyes: Negative.    Respiratory:  Positive for cough. Negative for chest tightness and shortness of breath.         As per HPI   Cardiovascular: Negative.    Gastrointestinal: Negative.    Endocrine: Negative.    Genitourinary: Negative.    Musculoskeletal:  Positive for arthralgias.   Allergic/Immunologic: Negative.    Neurological: Negative.    Hematological: Negative.    Psychiatric/Behavioral: Negative.          Problem List:     Patient Active Problem List   Diagnosis   • Renal cell cancer, left (HCC)   • Paroxysmal atrial fibrillation (HCC)   • Osteoporosis   • Seasonal affective disorder (HCC)   • Cognitive impairment   • Ankle arthritis   • Stage 3a chronic kidney disease (HCC)   • Forgetfulness   • Chronic cough      Past Medical and Surgical History:     Past Medical History:   Diagnosis Date   • E coli bacteremia    • Pyelonephritis    • Sepsis due to Escherichia coli without acute organ dysfunction (HCC)      Past Surgical History:   Procedure Laterality Date   • HEMORROIDECTOMY     • REPLACEMENT TOTAL KNEE Right 2004   • TOTAL HIP ARTHROPLASTY Right 2006      Family History:     Family History   Problem Relation Age of Onset   • Pancreatic cancer Brother       Social History:     Social History     Socioeconomic History   • Marital status: /Civil Union     Spouse name: None   • Number of children: None   • Years of education: None   • Highest education level: None   Occupational History   • None   Tobacco Use   • Smoking status: Never   • Smokeless tobacco: Never   Vaping Use   • Vaping status: Never Used   Substance and Sexual Activity   • Alcohol use: Not  Currently   • Drug use: No   • Sexual activity: None   Other Topics Concern   • None   Social History Narrative   • None     Social Determinants of Health     Financial Resource Strain: Low Risk  (1/25/2024)    Overall Financial Resource Strain (CARDIA)    • Difficulty of Paying Living Expenses: Not hard at all   Food Insecurity: Not on file   Transportation Needs: No Transportation Needs (1/25/2024)    PRAPARE - Transportation    • Lack of Transportation (Medical): No    • Lack of Transportation (Non-Medical): No   Physical Activity: Not on file   Stress: Not on file   Social Connections: Not on file   Intimate Partner Violence: Not on file   Housing Stability: Not on file      Medications and Allergies:     Current Outpatient Medications   Medication Sig Dispense Refill   • acetaminophen (TYLENOL) 500 mg tablet Take 500 mg by mouth if needed for mild pain     • apixaban (Eliquis) 5 mg Take 5 mg by mouth 2 (two) times a day     • Cholecalciferol (Vitamin D) 50 MCG (2000 UT) CAPS Take by mouth     • Cranberry 1000 MG CAPS Take 2,500 mg by mouth daily     • ketoconazole (NIZORAL) 2 % shampoo Apply topically once     • Menaquinone-7 (K2 PO) Take 50 mg by mouth daily     • omega-3-acid ethyl esters (LOVAZA) 1 g capsule Take 1 g by mouth daily     • omeprazole (PriLOSEC) 40 MG capsule Take 40 mg by mouth daily     • sertraline (ZOLOFT) 50 mg tablet Take 1 tablet by mouth daily     • Zinc Gluconate 50 MG CAPS Take 50 mg by mouth     • famotidine (PEPCID) 40 MG tablet Take 1 tablet (40 mg total) by mouth daily at bedtime 90 tablet 3     No current facility-administered medications for this visit.     Allergies   Allergen Reactions   • Benzocaine Other (See Comments) and Vomiting     anesthesia-not sure of specific drug   • Risedronate Other (See Comments)     Other reaction(s): stomach pains   Other reaction(s): Nausea and Vomiting, Stomach upset   • Nsaids GI Intolerance      Immunizations:     Immunization History    Administered Date(s) Administered   • COVID-19 J&J (Sunita) vaccine 0.5 mL 04/05/2021, 11/21/2021   • COVID-19 Pfizer vac (Qamar-sucrose, gray cap) 12 yr+ IM 05/23/2022   • INFLUENZA 11/07/2003, 10/02/2012, 12/19/2022, 11/09/2023   • Influenza Split High Dose Preservative Free IM 11/04/2020   • Influenza, Seasonal Vaccine, Quadrivalent, Adjuvanted, .5e 12/19/2022   • Pneumococcal Conjugate 13-Valent 12/08/2019   • Pneumococcal Polysaccharide PPV23 02/02/2005   • Zoster Vaccine Recombinant 09/26/2021, 01/20/2022      Health Maintenance:     There are no preventive care reminders to display for this patient.      Topic Date Due   • COVID-19 Vaccine (4 - 2023-24 season) 09/01/2023      Medicare Screening Tests and Risk Assessments:     Gail is here for her Subsequent Wellness visit. Last Medicare Wellness visit information reviewed, patient interviewed and updates made to the record today.      Health Risk Assessment:   Patient rates overall health as good. Patient feels that their physical health rating is same. Patient is satisfied with their life. Eyesight was rated as same. Hearing was rated as same. Patient feels that their emotional and mental health rating is same. Patients states they are never, rarely angry. Patient states they are never, rarely unusually tired/fatigued. Pain experienced in the last 7 days has been some. Patient's pain rating has been 6/10. Patient states that she has experienced no weight loss or gain in last 6 months. Feet pain.     Depression Screening:   PHQ-9 Score: 0      Fall Risk Screening:   In the past year, patient has experienced: no history of falling in past year      Urinary Incontinence Screening:   Patient has leaked urine accidently in the last six months.     Home Safety:  Patient does not have trouble with stairs inside or outside of their home. Patient has working smoke alarms and has working carbon monoxide detector. Home safety hazards include: none.     Nutrition:    Current diet is Regular.     Medications:   Patient is currently taking over-the-counter supplements. OTC medications include: see medication list. Patient is able to manage medications.     Activities of Daily Living (ADLs)/Instrumental Activities of Daily Living (IADLs):   Walk and transfer into and out of bed and chair?: Yes  Dress and groom yourself?: Yes    Bathe or shower yourself?: Yes    Feed yourself? Yes  Do your laundry/housekeeping?: Yes  Manage your money, pay your bills and track your expenses?: Yes  Make your own meals?: Yes    Do your own shopping?: Yes    Previous Hospitalizations:   Any hospitalizations or ED visits within the last 12 months?: No      Advance Care Planning:   Living will: Yes    Durable POA for healthcare: Yes    Advanced directive: Yes      Cognitive Screening:   Provider or family/friend/caregiver concerned regarding cognition?: No    PREVENTIVE SCREENINGS      Cardiovascular Screening:    General: Screening Current    Due for: Lipid Panel      Diabetes Screening:     General: Risks and Benefits Discussed    Due for: Blood Glucose      Colorectal Cancer Screening:     General: Screening Not Indicated and Patient Declines      Breast Cancer Screening:     General: Screening Not Indicated and Patient Declines      Cervical Cancer Screening:    General: Screening Not Indicated      Osteoporosis Screening:    General: Screening Not Indicated and History Osteoporosis      Abdominal Aortic Aneurysm (AAA) Screening:        General: Screening Not Indicated      Lung Cancer Screening:     General: Screening Not Indicated      Hepatitis C Screening:    General: Screening Not Indicated    Screening, Brief Intervention, and Referral to Treatment (SBIRT)      Brief Intervention  Alcohol & drug use screenings were reviewed. No concerns regarding substance use disorder identified.     Other Counseling Topics:   Regular weightbearing exercise and calcium and vitamin D intake.     No results  "found.     Physical Exam:     /82 (BP Location: Left arm, Patient Position: Sitting, Cuff Size: Standard)   Pulse 81   Temp 97.5 °F (36.4 °C) (Temporal)   Resp 16   Ht 5' 5\" (1.651 m)   Wt 74.8 kg (165 lb)   SpO2 97%   BMI 27.46 kg/m²     Physical Exam  Vitals and nursing note reviewed.   Constitutional:       General: She is not in acute distress.     Appearance: Normal appearance. She is well-developed. She is not ill-appearing.   HENT:      Head: Normocephalic and atraumatic.      Mouth/Throat:      Mouth: Mucous membranes are moist.      Pharynx: No oropharyngeal exudate or posterior oropharyngeal erythema.      Comments: Postnasal drip, mild, white  Eyes:      General: No scleral icterus.     Conjunctiva/sclera: Conjunctivae normal.   Neck:      Vascular: No carotid bruit.   Cardiovascular:      Rate and Rhythm: Normal rate and regular rhythm.      Heart sounds: Normal heart sounds. No murmur heard.  Pulmonary:      Effort: Pulmonary effort is normal. No respiratory distress.      Breath sounds: Normal breath sounds.   Abdominal:      General: Bowel sounds are normal. There is no distension or abdominal bruit.      Palpations: Abdomen is soft.      Tenderness: There is no abdominal tenderness.      Hernia: No hernia is present.   Musculoskeletal:      Cervical back: Neck supple. No rigidity.      Right lower leg: No edema.      Left lower leg: No edema.   Skin:     General: Skin is warm.   Neurological:      General: No focal deficit present.      Mental Status: She is alert and oriented to person, place, and time.   Psychiatric:         Mood and Affect: Mood normal.         Behavior: Behavior normal.         Thought Content: Thought content normal.          Vicky Wilson MD  "

## 2024-01-26 DIAGNOSIS — K21.9 CHRONIC GERD: ICD-10-CM

## 2024-01-26 RX ORDER — FAMOTIDINE 40 MG/1
40 TABLET, FILM COATED ORAL
Qty: 90 TABLET | Refills: 3 | Status: SHIPPED | OUTPATIENT
Start: 2024-01-26

## 2024-01-27 PROBLEM — J98.11 BILATERAL ATELECTASIS: Status: RESOLVED | Noted: 2020-06-28 | Resolved: 2024-01-27

## 2024-01-27 NOTE — ASSESSMENT & PLAN NOTE
Chronic arthritic ankle pain.  Patient is not a candidate for NSAIDs or turmeric since she is on Eliquis.  She may use Tylenol arthritis.  She will follow-up with [facility] orthopedic surgery to discuss possible injections

## 2024-02-02 ENCOUNTER — NURSE TRIAGE (OUTPATIENT)
Dept: OTHER | Facility: OTHER | Age: 84
End: 2024-02-02

## 2024-02-03 NOTE — TELEPHONE ENCOUNTER
"Reason for Disposition  • [1] Caller has URGENT medicine question about med that PCP or specialist prescribed AND [2] triager unable to answer question    Answer Assessment - Initial Assessment Questions  1. NAME of MEDICATION: \"What medicine are you calling about?\"      Cefuroxime Axetil ordered by her urologist and her regular meds Omeprazole /famotidine.  2. QUESTION: \"What is your question?\" (e.g., medication refill, side effect)      Can theese medications be taken together?  3. PRESCRIBING HCP: \"Who prescribed it?\" Reason: if prescribed by specialist, call should be referred to that group.      [facility] and / [facility]  4. SYMPTOMS: \"Do you have any symptoms?\"      Being treated for uti  5. SEVERITY: If symptoms are present, ask \"Are they mild, moderate or severe?\"      Possible drug interaction    Protocols used: Medication Question Call-ADULT-    "

## 2024-02-03 NOTE — TELEPHONE ENCOUNTER
"Regarding: Medication instruction/ medical guidance  ----- Message from Marian Graves sent at 2/2/2024  7:08 PM EST -----  Pt called, \" I was given abx from my urologist and in the brochure, I was instructed to call my doctor's office to ask if it's to take the abx if I am taking Omeprazole and Famotidine.\"    "

## 2024-02-06 ENCOUNTER — CONSULT (OUTPATIENT)
Dept: PULMONOLOGY | Facility: CLINIC | Age: 84
End: 2024-02-06
Payer: MEDICARE

## 2024-02-06 ENCOUNTER — APPOINTMENT (OUTPATIENT)
Dept: LAB | Facility: CLINIC | Age: 84
End: 2024-02-06
Payer: MEDICARE

## 2024-02-06 VITALS
WEIGHT: 165 LBS | HEART RATE: 88 BPM | TEMPERATURE: 98.1 F | SYSTOLIC BLOOD PRESSURE: 142 MMHG | DIASTOLIC BLOOD PRESSURE: 82 MMHG | BODY MASS INDEX: 27.46 KG/M2 | OXYGEN SATURATION: 99 %

## 2024-02-06 DIAGNOSIS — E55.9 VITAMIN D DEFICIENCY: ICD-10-CM

## 2024-02-06 DIAGNOSIS — R05.3 CHRONIC COUGH: Primary | ICD-10-CM

## 2024-02-06 DIAGNOSIS — I48.0 PAROXYSMAL ATRIAL FIBRILLATION (HCC): ICD-10-CM

## 2024-02-06 DIAGNOSIS — T78.40XA ALLERGY, UNSPECIFIED, INITIAL ENCOUNTER: ICD-10-CM

## 2024-02-06 DIAGNOSIS — J45.991 COUGH VARIANT ASTHMA: ICD-10-CM

## 2024-02-06 DIAGNOSIS — N18.31 STAGE 3A CHRONIC KIDNEY DISEASE (HCC): ICD-10-CM

## 2024-02-06 DIAGNOSIS — R05.3 CHRONIC COUGH: ICD-10-CM

## 2024-02-06 DIAGNOSIS — Z13.220 ENCOUNTER FOR SCREENING FOR LIPID DISORDER: ICD-10-CM

## 2024-02-06 LAB
25(OH)D3 SERPL-MCNC: 43.4 NG/ML (ref 30–100)
ALBUMIN SERPL BCP-MCNC: 4.2 G/DL (ref 3.5–5)
ALP SERPL-CCNC: 49 U/L (ref 34–104)
ALT SERPL W P-5'-P-CCNC: 19 U/L (ref 7–52)
ANION GAP SERPL CALCULATED.3IONS-SCNC: 8 MMOL/L
AST SERPL W P-5'-P-CCNC: 26 U/L (ref 13–39)
BASOPHILS # BLD AUTO: 0.03 THOUSANDS/ÂΜL (ref 0–0.1)
BASOPHILS NFR BLD AUTO: 1 % (ref 0–1)
BILIRUB SERPL-MCNC: 0.49 MG/DL (ref 0.2–1)
BUN SERPL-MCNC: 29 MG/DL (ref 5–25)
CALCIUM SERPL-MCNC: 10 MG/DL (ref 8.4–10.2)
CHLORIDE SERPL-SCNC: 100 MMOL/L (ref 96–108)
CHOLEST SERPL-MCNC: 242 MG/DL
CO2 SERPL-SCNC: 29 MMOL/L (ref 21–32)
CREAT SERPL-MCNC: 1.07 MG/DL (ref 0.6–1.3)
EOSINOPHIL # BLD AUTO: 0.19 THOUSAND/ÂΜL (ref 0–0.61)
EOSINOPHIL NFR BLD AUTO: 4 % (ref 0–6)
ERYTHROCYTE [DISTWIDTH] IN BLOOD BY AUTOMATED COUNT: 13.7 % (ref 11.6–15.1)
GFR SERPL CREATININE-BSD FRML MDRD: 48 ML/MIN/1.73SQ M
GLUCOSE P FAST SERPL-MCNC: 87 MG/DL (ref 65–99)
HCT VFR BLD AUTO: 41.1 % (ref 34.8–46.1)
HDLC SERPL-MCNC: 79 MG/DL
HGB BLD-MCNC: 13 G/DL (ref 11.5–15.4)
IMM GRANULOCYTES # BLD AUTO: 0.01 THOUSAND/UL (ref 0–0.2)
IMM GRANULOCYTES NFR BLD AUTO: 0 % (ref 0–2)
LDLC SERPL CALC-MCNC: 143 MG/DL (ref 0–100)
LYMPHOCYTES # BLD AUTO: 1.16 THOUSANDS/ÂΜL (ref 0.6–4.47)
LYMPHOCYTES NFR BLD AUTO: 24 % (ref 14–44)
MCH RBC QN AUTO: 30.1 PG (ref 26.8–34.3)
MCHC RBC AUTO-ENTMCNC: 31.6 G/DL (ref 31.4–37.4)
MCV RBC AUTO: 95 FL (ref 82–98)
MONOCYTES # BLD AUTO: 0.47 THOUSAND/ÂΜL (ref 0.17–1.22)
MONOCYTES NFR BLD AUTO: 10 % (ref 4–12)
NEUTROPHILS # BLD AUTO: 3.04 THOUSANDS/ÂΜL (ref 1.85–7.62)
NEUTS SEG NFR BLD AUTO: 61 % (ref 43–75)
NRBC BLD AUTO-RTO: 0 /100 WBCS
PLATELET # BLD AUTO: 186 THOUSANDS/UL (ref 149–390)
PMV BLD AUTO: 11.2 FL (ref 8.9–12.7)
POTASSIUM SERPL-SCNC: 5 MMOL/L (ref 3.5–5.3)
PROT SERPL-MCNC: 6.8 G/DL (ref 6.4–8.4)
RBC # BLD AUTO: 4.32 MILLION/UL (ref 3.81–5.12)
SODIUM SERPL-SCNC: 137 MMOL/L (ref 135–147)
TRIGL SERPL-MCNC: 99 MG/DL
TSH SERPL DL<=0.05 MIU/L-ACNC: 2.16 UIU/ML (ref 0.45–4.5)
WBC # BLD AUTO: 4.9 THOUSAND/UL (ref 4.31–10.16)

## 2024-02-06 PROCEDURE — 86602 ANTINOMYCES ANTIBODY: CPT

## 2024-02-06 PROCEDURE — 99205 OFFICE O/P NEW HI 60 MIN: CPT | Performed by: INTERNAL MEDICINE

## 2024-02-06 PROCEDURE — 86606 ASPERGILLUS ANTIBODY: CPT

## 2024-02-06 PROCEDURE — 80061 LIPID PANEL: CPT

## 2024-02-06 PROCEDURE — 86331 IMMUNODIFFUSION OUCHTERLONY: CPT

## 2024-02-06 PROCEDURE — 84443 ASSAY THYROID STIM HORMONE: CPT

## 2024-02-06 PROCEDURE — 86003 ALLG SPEC IGE CRUDE XTRC EA: CPT

## 2024-02-06 PROCEDURE — 36415 COLL VENOUS BLD VENIPUNCTURE: CPT

## 2024-02-06 PROCEDURE — 82306 VITAMIN D 25 HYDROXY: CPT

## 2024-02-06 PROCEDURE — 85025 COMPLETE CBC W/AUTO DIFF WBC: CPT

## 2024-02-06 PROCEDURE — 86671 FUNGUS NES ANTIBODY: CPT

## 2024-02-06 PROCEDURE — 82785 ASSAY OF IGE: CPT

## 2024-02-06 PROCEDURE — 80053 COMPREHEN METABOLIC PANEL: CPT

## 2024-02-06 RX ORDER — ALBUTEROL SULFATE 90 UG/1
2 AEROSOL, METERED RESPIRATORY (INHALATION) EVERY 6 HOURS PRN
Qty: 6.7 G | Refills: 1 | Status: SHIPPED | OUTPATIENT
Start: 2024-02-06

## 2024-02-06 RX ORDER — TITANIUM DIOXIDE, OCTINOXATE, ZINC OXIDE 4.61; 1.6; .78 G/40ML; G/40ML; G/40ML
400 CREAM TOPICAL
COMMUNITY

## 2024-02-06 RX ORDER — CEFUROXIME AXETIL 500 MG/1
500 TABLET ORAL 2 TIMES DAILY
COMMUNITY
Start: 2024-02-01 | End: 2024-02-11

## 2024-02-06 RX ORDER — OMEGA-3 FATTY ACIDS/FISH OIL 300-1000MG
1 CAPSULE ORAL
COMMUNITY

## 2024-02-06 NOTE — PROGRESS NOTES
Pulmonary Consultation   Gail Bradshaw 83 y.o. female MRN: [id number]    Reason for consultation: Evaluation and management of chronic cough    Requesting physician: Dr. Wilson    Assessment/Plan  Gail Bradshaw is a 83-year-old woman with PMH of chronic cough, paroxysmal atrial fibrillation on Apixaban 5 mg BID, anxiety/depression, GERD, and CKD stage IIIa in the setting of left RCC (s/p nephrecomty), who presents to the pulmonary clinic for the evaluation and management of chronic cough. Unaccompanied in the clinic today. TTE on 7/11/2023 - LVEF 60-65% with grade I diastolic dysfunction. CXR on 1/8/2024 - no active disease. 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 Differential includes laryngospasm (due to GERD, asthma, or irritable larynx syndrome) and cough variant asthma. Ordered PFTs to evaluate for asthma. Ordered NE allergy panel/hypersensitivity pneumonitis profile to evaluate for potential triggers. Prescribed Albuterol 2 puffs PRN - to be used around these episodes. Recommend patient to follow-up with GI and ST. Will consider ordering a CT thorax without contrast at her follow-up appointment. Staffed and seen with Dr. Monson on 2/6/2024. Follow-up in 3 months.     History of Present Illness   Gail Bradshaw is a 83-year-old woman with PMH of chronic cough, paroxysmal atrial fibrillation on Apixaban 5 mg BID, anxiety/depression, GERD, and CKD stage IIIa in the setting of left RCC (s/p nephrecomty), who presents to the pulmonary clinic for the evaluation and management of chronic cough. Unaccompanied in the clinic today. TTE on 7/11/2023 - LVEF 60-65% with grade I diastolic dysfunction. CXR on 1/8/2024 - no active disease. 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 Evaluated by [facility]NT at [facility] on 1/17/2024 diagnosed possible laryngospasm and advised to follow up with ST/GI. No prior PFTs or allergy testing. Denies any recent change in weight. Reviewed with patient her PMH, PSH, medications, allergies, social history, and family history which are as documented    On evaluation, patient is sitting in chair and in no acute distress. History of brief (~60 second) coughing fits for many years. Frequency of ~1-2 times per year (more frequent in the past ~18 months with ~4-5 episodes during that time). Denies a change in the nature or intensity of these events. One prior event was \"so severe\" she \"almost passed out.\" Episodes appear \"out of the blue\" but may be related to prolonged talking. Denies symptoms occur out of sleep or in the early morning. Chronic intermittent dry cough. She has window air conditioners and a wood burning furnace. Notes her  recently changed their air filter which had mold in it. No pets or tobacco exposure. Denies prior work related exposures. GERD managed with famotidine and omeprazole (no change in coughing fits since she started these medications). Endorses \"good sleep\" quality  -denies insomnia and awakens every ~3-4 hours due to nocturia. Occasional snoring per . Non-refreshing sleep ~20% of the time. Denies EDS or daytime nap. Intermittent orthostatic hypotension and chronic feet pain. Enjoys reading historical novels throughout the day. Denies a history of asthma. Denies SOB, DUTTON, wheezing, chest pain, palpitations, PND, orthopnea, peripheral edema, weakness, numbness/tingling, change in vision, tremor, imbalance, falls, dysarthria, or dysphagia. Drinks ~1 cup of coffee in the AM. She was a homemaker.     No issues with driving     Family History:  Denies family history of similar symptoms    Historical Information   Past Medical History:   Diagnosis Date    E " coli bacteremia     Pyelonephritis     Sepsis due to Escherichia coli without acute organ dysfunction (HCC)      Past Surgical History:   Procedure Laterality Date    HEMORROIDECTOMY      REPLACEMENT TOTAL KNEE Right 2004    TOTAL HIP ARTHROPLASTY Right 2006     Family History   Problem Relation Age of Onset    Pancreatic cancer Brother      Social History     Socioeconomic History    Marital status: /Civil Union     Spouse name: Not on file    Number of children: Not on file    Years of education: Not on file    Highest education level: Not on file   Occupational History    Not on file   Tobacco Use    Smoking status: Never    Smokeless tobacco: Never   Vaping Use    Vaping status: Never Used   Substance and Sexual Activity    Alcohol use: Not Currently    Drug use: No    Sexual activity: Not on file   Other Topics Concern    Not on file   Social History Narrative    Not on file     Social Determinants of Health     Financial Resource Strain: Low Risk  (1/25/2024)    Overall Financial Resource Strain (CARDIA)     Difficulty of Paying Living Expenses: Not hard at all   Food Insecurity: Not on file   Transportation Needs: No Transportation Needs (1/25/2024)    PRAPARE - Transportation     Lack of Transportation (Medical): No     Lack of Transportation (Non-Medical): No   Physical Activity: Not on file   Stress: Not on file   Social Connections: Not on file   Intimate Partner Violence: Not on file   Housing Stability: Not on file     Meds/Allergies   Allergies   Allergen Reactions    Benzocaine Other (See Comments) and Vomiting     anesthesia-not sure of specific drug    Risedronate Other (See Comments)     Other reaction(s): stomach pains   Other reaction(s): Nausea and Vomiting, Stomach upset    Nsaids GI Intolerance     Home medications:  Prior to Admission medications    Medication Sig Start Date End Date Taking? Authorizing Provider   acetaminophen (TYLENOL) 500 mg tablet Take 500 mg by mouth if needed for  mild pain    Historical Provider, MD   apixaban (Eliquis) 5 mg Take 5 mg by mouth 2 (two) times a day    Historical Provider, MD   Cholecalciferol (Vitamin D) 50 MCG (2000 UT) CAPS Take by mouth    Historical Provider, MD   Cranberry 1000 MG CAPS Take 2,500 mg by mouth daily    Historical Provider, MD   famotidine (PEPCID) 40 MG tablet Take 1 tablet (40 mg total) by mouth daily at bedtime 1/26/24   Vicky Wilson MD   ketoconazole (NIZORAL) 2 % shampoo Apply topically once    Historical Provider, MD   Menaquinone-7 (K2 PO) Take 50 mg by mouth daily    Historical Provider, MD   omega-3-acid ethyl esters (LOVAZA) 1 g capsule Take 1 g by mouth daily    Historical Provider, MD   omeprazole (PriLOSEC) 40 MG capsule Take 40 mg by mouth daily 8/15/23   Historical Provider, MD   sertraline (ZOLOFT) 50 mg tablet Take 1 tablet by mouth daily 2/21/18   Historical Provider, MD   Zinc Gluconate 50 MG CAPS Take 50 mg by mouth    Historical Provider, MD     Visit Vitals  /82   Pulse 88   Temp 98.1 °F (36.7 °C) (Tympanic)   Wt 74.8 kg (165 lb)   SpO2 99%   BMI 27.46 kg/m²   Smoking Status Never   BSA 1.82 m²     Physical Exam:  General: Sitting in chair, awake, and alert. No acute distress  HEENT: Nares patent, no craniofacial abnormalities. Mucous membranes, moist, no oral lesions, and normal dentition. Mallampati class - III  NECK: Trachea midline, no accessory muscle use, and no stridor   CARDIAC: Regular rate and rhythm  PULM: CTA bilaterally no wheezing, rhonchi or rales. No conversational dyspnea  EXT: No peripheral edema    NEURO: No focal neurologic deficits, moving all extremities appropriately    Labs: I have personally reviewed pertinent lab results.  Lab Results   Component Value Date    WBC 4.21 (L) 07/01/2020    HGB 11.2 (L) 07/01/2020    HCT 35.0 07/01/2020    MCV 96 07/01/2020     07/01/2020      Lab Results   Component Value Date    GLUCOSE 120 11/24/2015    CALCIUM 9.5 06/22/2023      "11/24/2015    K 4.8 06/22/2023    CO2 30 06/22/2023     06/22/2023    BUN 28 (H) 06/22/2023    CREATININE 1.07 06/22/2023     No results found for: \"IRON\", \"TIBC\", \"FERRITIN\"  No results found for: \"WFEYNZEA40\"  No results found for: \"FOLATE\"    Sleep studies:  No prior sleep study    Isiah Amezquita DO  [facility] Sleep Fellow  "

## 2024-02-07 ENCOUNTER — TELEPHONE (OUTPATIENT)
Age: 84
End: 2024-02-07

## 2024-02-07 LAB

## 2024-02-08 ENCOUNTER — TELEPHONE (OUTPATIENT)
Dept: FAMILY MEDICINE CLINIC | Facility: CLINIC | Age: 84
End: 2024-02-08

## 2024-02-08 ENCOUNTER — CONSULT (OUTPATIENT)
Dept: GASTROENTEROLOGY | Facility: CLINIC | Age: 84
End: 2024-02-08
Payer: MEDICARE

## 2024-02-08 VITALS
HEIGHT: 65 IN | TEMPERATURE: 97.5 F | BODY MASS INDEX: 27.49 KG/M2 | WEIGHT: 165 LBS | SYSTOLIC BLOOD PRESSURE: 116 MMHG | DIASTOLIC BLOOD PRESSURE: 72 MMHG

## 2024-02-08 DIAGNOSIS — J45.991 COUGH VARIANT ASTHMA: Primary | ICD-10-CM

## 2024-02-08 DIAGNOSIS — K21.9 CHRONIC GERD: ICD-10-CM

## 2024-02-08 PROCEDURE — 99203 OFFICE O/P NEW LOW 30 MIN: CPT | Performed by: INTERNAL MEDICINE

## 2024-02-08 NOTE — PROGRESS NOTES
[facility] - Outpatient Consultation  Gail Bradshaw 83 y.o. female MRN: [id number]  Encounter: [id number]          ASSESSMENT AND PLAN:      1. Chronic GERD  Continue omeprazole and famotidine  Review reflux precautions   I reviewed diet and lifestyle precautions. This includes limiting coffee, soda, tomatoes, citrus, fatty and spicy foods.  I recommend waiting 3 hours after dinner to lie down. I recommend eating small frequent meals as well as sleeping with head of bed elevated at night.  No further endoscopic evaluation indicated at this time    ______________________________________________________________________    HPI: 84-year-old female with longstanding reflux symptoms here for follow-up visit.  She takes famotidine for an omeprazole for reflux.  Her symptoms are well-controlled except intermittent cough.    She has no heartburn, regurgitation while taking omeprazole and famotidine  No dysphagia  REVIEW OF SYSTEMS:    CONSTITUTIONAL: Denies any fever, chills, rigors, and weight loss.  HEENT: No earache or tinnitus. Denies hearing loss or visual disturbances.  CARDIOVASCULAR: No chest pain or palpitations.   RESPIRATORY: Denies any cough, hemoptysis, shortness of breath or dyspnea on exertion.  GASTROINTESTINAL: As noted in the History of Present Illness.   GENITOURINARY: No problems with urination. Denies any hematuria or dysuria.  NEUROLOGIC: No dizziness or vertigo, denies headaches.   MUSCULOSKELETAL: Denies any muscle or joint pain.   SKIN: Denies skin rashes or itching.   ENDOCRINE: Denies excessive thirst. Denies intolerance to heat or cold.  PSYCHOSOCIAL: Denies depression or anxiety. Denies any recent memory loss.       Historical Information   Past Medical History:   Diagnosis Date    E coli bacteremia     Pyelonephritis     Sepsis due to Escherichia coli without acute organ dysfunction (HCC)      Past Surgical History:   Procedure Laterality Date    HEMORROIDECTOMY    "   REPLACEMENT TOTAL KNEE Right 2004    TOTAL HIP ARTHROPLASTY Right 2006     Social History   Social History     Substance and Sexual Activity   Alcohol Use Not Currently     Social History     Substance and Sexual Activity   Drug Use No     Social History     Tobacco Use   Smoking Status Never   Smokeless Tobacco Never     Family History   Problem Relation Age of Onset    Pancreatic cancer Brother        Meds/Allergies       Current Outpatient Medications:     acetaminophen (TYLENOL) 500 mg tablet    albuterol (Ventolin HFA) 90 mcg/act inhaler    apixaban (Eliquis) 5 mg    ascorbic acid (VITAMIN C) 250 MG CHEW    cefuroxime (CEFTIN) 500 mg tablet    Cholecalciferol (Vitamin D) 50 MCG (2000 UT) CAPS    Cranberry 1000 MG CAPS    Cranberry 400 MG CAPS    famotidine (PEPCID) 40 MG tablet    ketoconazole (NIZORAL) 2 % shampoo    Menaquinone-7 (K2 PO)    Omega 3 1000 MG CAPS    omega-3-acid ethyl esters (LOVAZA) 1 g capsule    omeprazole (PriLOSEC) 40 MG capsule    sertraline (ZOLOFT) 50 mg tablet    Zinc Gluconate 50 MG CAPS    Allergies   Allergen Reactions    Benzocaine Other (See Comments) and Vomiting     anesthesia-not sure of specific drug    Risedronate Other (See Comments)     Other reaction(s): stomach pains   Other reaction(s): Nausea and Vomiting, Stomach upset    Nsaids GI Intolerance           Objective     Blood pressure 116/72, temperature 97.5 °F (36.4 °C), temperature source Tympanic, height 5' 5\" (1.651 m), weight 74.8 kg (165 lb). Body mass index is 27.46 kg/m².        PHYSICAL EXAM:      General Appearance:   Alert, cooperative, no distress   HEENT:   Normocephalic, atraumatic, anicteric.     Neck:  Supple, symmetrical, trachea midline   Lungs:   Clear to auscultation bilaterally; no rales, rhonchi or wheezing; respirations unlabored    Heart::   Regular rate and rhythm; no murmur, rub, or gallop.   Abdomen:   Soft, non-tender, non-distended; normal bowel sounds; no masses, no organomegaly  "   Genitalia:   Deferred    Rectal:   Deferred    Extremities:  No cyanosis, clubbing or edema    Pulses:  2+ and symmetric    Skin:  No jaundice, rashes, or lesions    Lymph nodes:  No palpable cervical lymphadenopathy        Lab Results:   No visits with results within 1 Day(s) from this visit.   Latest known visit with results is:   Appointment on 02/06/2024   Component Date Value    WBC 02/06/2024 4.90     RBC 02/06/2024 4.32     Hemoglobin 02/06/2024 13.0     Hematocrit 02/06/2024 41.1     MCV 02/06/2024 95     MCH 02/06/2024 30.1     MCHC 02/06/2024 31.6     RDW 02/06/2024 13.7     MPV 02/06/2024 11.2     Platelets 02/06/2024 186     nRBC 02/06/2024 0     Neutrophils Relative 02/06/2024 61     Immat GRANS % 02/06/2024 0     Lymphocytes Relative 02/06/2024 24     Monocytes Relative 02/06/2024 10     Eosinophils Relative 02/06/2024 4     Basophils Relative 02/06/2024 1     Neutrophils Absolute 02/06/2024 3.04     Immature Grans Absolute 02/06/2024 0.01     Lymphocytes Absolute 02/06/2024 1.16     Monocytes Absolute 02/06/2024 0.47     Eosinophils Absolute 02/06/2024 0.19     Basophils Absolute 02/06/2024 0.03     Sodium 02/06/2024 137     Potassium 02/06/2024 5.0     Chloride 02/06/2024 100     CO2 02/06/2024 29     ANION GAP 02/06/2024 8     BUN 02/06/2024 29 (H)     Creatinine 02/06/2024 1.07     Glucose, Fasting 02/06/2024 87     Calcium 02/06/2024 10.0     AST 02/06/2024 26     ALT 02/06/2024 19     Alkaline Phosphatase 02/06/2024 49     Total Protein 02/06/2024 6.8     Albumin 02/06/2024 4.2     Total Bilirubin 02/06/2024 0.49     eGFR 02/06/2024 48     Cholesterol 02/06/2024 242 (H)     Triglycerides 02/06/2024 99     HDL, Direct 02/06/2024 79     LDL Calculated 02/06/2024 143 (H)     TSH 3RD GENERATON 02/06/2024 2.159     Vit D, 25-Hydroxy 02/06/2024 43.4     A.ALTERNATA 02/06/2024 <0.10     A.FUMIGATUS 02/06/2024 <0.10     [place] Grass 02/06/2024 <0.10     [place]rier  02/06/2024 <0.10     Cat  Epithellium-Dander 02/06/2024 <0.10     C.HERBARUM 02/06/2024 <0.10     Cockroach 02/06/2024 <0.10     Common Silver Birch 02/06/2024 <0.10     [place] 02/06/2024 <0.10     D. farinae 02/06/2024 <0.10     D. pteronyssinus 02/06/2024 <0.10     Dog Dander 02/06/2024 <0.10     Elm IgE 02/06/2024 <0.10     [place] Tree 02/06/2024 <0.10     Mugwort 02/06/2024 <0.10     [place] Tree 02/06/2024 <0.10     Oak 02/06/2024 <0.10     P.CHRYSOGENUM 02/06/2024 <0.10     Rough Pigweed  IgE 02/06/2024 <0.10     Common Ragweed 02/06/2024 <0.10     Sheep Sorrel IgE 02/06/2024 <0.10     [place] Tree 02/06/2024 <0.10     Harjinder Grass 02/06/2024 <0.10     [place] Tree 02/06/2024 <0.10     White [place]avier Tree 02/06/2024 <0.10     IgE 02/06/2024 6.93     MOUSE URINE 02/06/2024 <0.10          Radiology Results:   No results found.

## 2024-02-08 NOTE — PATIENT INSTRUCTIONS
Continue omeprazole 40 mg in the morning and Pepcid at bedtime   I reviewed diet and lifestyle precautions. This includes limiting coffee, soda, tomatoes, citrus, fatty and spicy foods.  I recommend waiting 3 hours after dinner to lie down. I recommend eating small frequent meals as well as sleeping with head of bed elevated at night.  I think her symptoms are due to reflux.  I recommend reflux precaution.  No further endoscopic evaluation indicated at this time.

## 2024-02-09 NOTE — TELEPHONE ENCOUNTER
Please contact patient regarding results of recent blood work.    All labs were normal aside from elevated cholesterol at 242.    I do not have any recent cholesterol panels for comparison.  Please ask patient to follow low-fat low-cholesterol diet.  She should discuss results of blood work at her forthcoming appointment with cardiologist.     Thank you

## 2024-02-12 LAB
A FUMIGATUS1 AB SER QL ID: NEGATIVE
A PULLULANS AB SER QL: NEGATIVE
LACEYELLA SACCHARI AB SER QL: NEGATIVE
PIGEON SERUM AB QL ID: NEGATIVE
S RECTIVIRGULA AB SER QL ID: NEGATIVE
T VULGARIS AB SER QL ID: NEGATIVE

## 2024-02-19 ENCOUNTER — TELEPHONE (OUTPATIENT)
Age: 84
End: 2024-02-19

## 2024-02-21 ENCOUNTER — HOSPITAL ENCOUNTER (OUTPATIENT)
Dept: PULMONOLOGY | Facility: HOSPITAL | Age: 84
Discharge: HOME | End: 2024-02-21
Payer: MEDICARE

## 2024-02-21 DIAGNOSIS — R05.3 CHRONIC COUGH: ICD-10-CM

## 2024-02-21 PROCEDURE — 94726 PLETHYSMOGRAPHY LUNG VOLUMES: CPT | Performed by: INTERNAL MEDICINE

## 2024-02-21 PROCEDURE — 94729 DIFFUSING CAPACITY: CPT

## 2024-02-21 PROCEDURE — 94060 EVALUATION OF WHEEZING: CPT | Performed by: INTERNAL MEDICINE

## 2024-02-21 PROCEDURE — 94729 DIFFUSING CAPACITY: CPT | Performed by: INTERNAL MEDICINE

## 2024-02-21 PROCEDURE — 94726 PLETHYSMOGRAPHY LUNG VOLUMES: CPT

## 2024-02-21 PROCEDURE — 94060 EVALUATION OF WHEEZING: CPT

## 2024-02-21 PROCEDURE — 94760 N-INVAS EAR/PLS OXIMETRY 1: CPT

## 2024-02-21 RX ORDER — ALBUTEROL SULFATE 2.5 MG/3ML
2.5 SOLUTION RESPIRATORY (INHALATION) ONCE
Status: COMPLETED | OUTPATIENT
Start: 2024-02-21 | End: 2024-02-21

## 2024-02-21 RX ADMIN — ALBUTEROL SULFATE 2.5 MG: 2.5 SOLUTION RESPIRATORY (INHALATION) at 15:27

## 2024-03-19 ENCOUNTER — TELEPHONE (OUTPATIENT)
Dept: PULMONOLOGY | Facility: CLINIC | Age: 84
End: 2024-03-19

## 2024-03-19 NOTE — TELEPHONE ENCOUNTER
Spoke with patient to reschedule 4/12 appt with Dr. Monson at [facility] office. Appointment now on 4/11.

## 2024-04-13 ENCOUNTER — NURSE TRIAGE (OUTPATIENT)
Dept: OTHER | Facility: OTHER | Age: 84
End: 2024-04-13

## 2024-04-13 NOTE — TELEPHONE ENCOUNTER
"Reason for Disposition  • Side (flank) or lower back pain present    Answer Assessment - Initial Assessment Questions  1. SYMPTOM: \"What's the main symptom you're concerned about?\" (e.g., frequency, incontinence)      Urgency    2. ONSET: \"When did the  urgency  start?\"     Late afternoon yesterday    3. PAIN: \"Is there any pain?\" If Yes, ask: \"How bad is it?\" (Scale: 1-10; mild, moderate, severe)      Left Lower Flank pain with urination    4. CAUSE: \"What do you think is causing the symptoms?\"      Possible UTI    5. OTHER SYMPTOMS: \"Do you have any other symptoms?\" (e.g., fever, flank pain, blood in urine, pain with urination)      Denies fever  L flank pain    6. PREGNANCY: \"Is there any chance you are pregnant?\" \"When was your last menstrual period?\"      N/A    Protocols used: Urinary Symptoms-ADULT-    "

## 2024-04-13 NOTE — TELEPHONE ENCOUNTER
"Regarding: possible bladder infection  ----- Message from Julianna Shay sent at 4/13/2024  1:53 PM EDT -----  \" I think I'm coming down with a bladder infection what should I do\"    "

## 2024-04-22 ENCOUNTER — TELEPHONE (OUTPATIENT)
Dept: FAMILY MEDICINE CLINIC | Facility: CLINIC | Age: 84
End: 2024-04-22

## 2024-04-22 DIAGNOSIS — F33.8 SEASONAL AFFECTIVE DISORDER (HCC): Primary | ICD-10-CM

## 2024-04-22 NOTE — TELEPHONE ENCOUNTER
Patient came in and stated that Dr Roca was prescriber her Sertraline 50 mg tablets take 1 in the evening and now she only has 3 left and would like to know if you could fill a 90 day for her?  Please call to advise

## 2024-04-25 ENCOUNTER — TELEPHONE (OUTPATIENT)
Age: 84
End: 2024-04-25

## 2024-04-25 ENCOUNTER — OFFICE VISIT (OUTPATIENT)
Dept: FAMILY MEDICINE CLINIC | Facility: CLINIC | Age: 84
End: 2024-04-25
Payer: MEDICARE

## 2024-04-25 VITALS — DIASTOLIC BLOOD PRESSURE: 80 MMHG | WEIGHT: 161.8 LBS | BODY MASS INDEX: 26.92 KG/M2 | SYSTOLIC BLOOD PRESSURE: 134 MMHG

## 2024-04-25 DIAGNOSIS — N30.01 ACUTE CYSTITIS WITH HEMATURIA: Primary | ICD-10-CM

## 2024-04-25 LAB
SL AMB  POCT GLUCOSE, UA: NEGATIVE
SL AMB LEUKOCYTE ESTERASE,UA: 125
SL AMB POCT BILIRUBIN,UA: NEGATIVE
SL AMB POCT BLOOD,UA: POSITIVE
SL AMB POCT CLARITY,UA: ABNORMAL
SL AMB POCT COLOR,UA: YELLOW
SL AMB POCT KETONES,UA: NEGATIVE
SL AMB POCT NITRITE,UA: NEGATIVE
SL AMB POCT PH,UA: NEGATIVE
SL AMB POCT SPECIFIC GRAVITY,UA: 1.01
SL AMB POCT URINE PROTEIN: 15
SL AMB POCT UROBILINOGEN: NEGATIVE

## 2024-04-25 PROCEDURE — 99213 OFFICE O/P EST LOW 20 MIN: CPT | Performed by: FAMILY MEDICINE

## 2024-04-25 PROCEDURE — 81002 URINALYSIS NONAUTO W/O SCOPE: CPT | Performed by: FAMILY MEDICINE

## 2024-04-25 PROCEDURE — 87086 URINE CULTURE/COLONY COUNT: CPT | Performed by: FAMILY MEDICINE

## 2024-04-25 PROCEDURE — 87077 CULTURE AEROBIC IDENTIFY: CPT | Performed by: FAMILY MEDICINE

## 2024-04-25 PROCEDURE — G2211 COMPLEX E/M VISIT ADD ON: HCPCS | Performed by: FAMILY MEDICINE

## 2024-04-25 PROCEDURE — 87186 SC STD MICRODIL/AGAR DIL: CPT | Performed by: FAMILY MEDICINE

## 2024-04-25 RX ORDER — SULFAMETHOXAZOLE AND TRIMETHOPRIM 800; 160 MG/1; MG/1
1 TABLET ORAL 2 TIMES DAILY
Qty: 14 TABLET | Refills: 0 | Status: SHIPPED | OUTPATIENT
Start: 2024-04-25 | End: 2024-05-02

## 2024-04-25 RX ORDER — CEPHALEXIN 500 MG/1
CAPSULE ORAL
COMMUNITY
Start: 2024-04-13

## 2024-04-25 NOTE — ASSESSMENT & PLAN NOTE
UTI.  Patient will have urine culture to verify infection.  She was given prescription for Bactrim DS to take 1 twice daily for 7 days.  We will make further recommendations pending results of test.

## 2024-04-25 NOTE — PROGRESS NOTES
FAMILY PRACTICE OFFICE VISIT       NAME: Gail Bradshaw  AGE: 84 y.o. SEX: female       : 1940        MRN: [id number]    DATE: 2024  TIME: 11:18 AM    Assessment and Plan     Problem List Items Addressed This Visit       Acute cystitis with hematuria - Primary     UTI.  Patient will have urine culture to verify infection.  She was given prescription for Bactrim DS to take 1 twice daily for 7 days.  We will make further recommendations pending results of test.         Relevant Medications    sulfamethoxazole-trimethoprim (BACTRIM DS) 800-160 mg per tablet    Other Relevant Orders    Urine culture    POCT urine dip (Completed)       TCM Call       None          TCM Call       None              Chief Complaint     Chief Complaint   Patient presents with    Urinary Tract Infection     Follow up  Goes to [facility] on  Dx:UTI ABX prescribed after urine sample  Patient states still with pressure and urgency       History of Present Illness     Patient in the office after being placed on cephalexin by urgent care center for suspected UTI infection that was confirmed on urine culture.  Patient began to feel slightly better however after completing course of antibiotic her symptoms began to exacerbate with frequency or dysuria.  She denies any documented fevers.    Urinary Tract Infection   Associated symptoms include frequency.       Review of Systems   Review of Systems   Constitutional: Negative.    Respiratory: Negative.     Cardiovascular: Negative.    Gastrointestinal: Negative.    Genitourinary:  Positive for dysuria and frequency.        As per HPI       Active Problem List     Patient Active Problem List   Diagnosis    Renal cell cancer, left (HCC)    Paroxysmal atrial fibrillation (HCC)    Osteoporosis    Seasonal affective disorder (HCC)    Cognitive impairment    Ankle arthritis    Stage 3a chronic kidney disease (HCC)    Forgetfulness    Chronic cough    Allergy, unspecified, initial encounter     Cough variant asthma    Acute cystitis with hematuria       Past Medical History:  Past Medical History:   Diagnosis Date    E coli bacteremia     Pyelonephritis     Sepsis due to Escherichia coli without acute organ dysfunction (HCC)        Past Surgical History:  Past Surgical History:   Procedure Laterality Date    HEMORROIDECTOMY      REPLACEMENT TOTAL KNEE Right 2004    TOTAL HIP ARTHROPLASTY Right 2006       Family History:  Family History   Problem Relation Age of Onset    Pancreatic cancer Brother        Social History:  Social History     Socioeconomic History    Marital status: /Civil Union     Spouse name: Not on file    Number of children: Not on file    Years of education: Not on file    Highest education level: Not on file   Occupational History    Not on file   Tobacco Use    Smoking status: Never    Smokeless tobacco: Never   Vaping Use    Vaping status: Never Used   Substance and Sexual Activity    Alcohol use: Not Currently    Drug use: No    Sexual activity: Not on file   Other Topics Concern    Not on file   Social History Narrative    Not on file     Social Determinants of Health     Financial Resource Strain: Low Risk  (1/25/2024)    Overall Financial Resource Strain (CARDIA)     Difficulty of Paying Living Expenses: Not hard at all   Food Insecurity: Not on file   Transportation Needs: No Transportation Needs (1/25/2024)    PRAPARE - Transportation     Lack of Transportation (Medical): No     Lack of Transportation (Non-Medical): No   Physical Activity: Not on file   Stress: Not on file   Social Connections: Not on file   Intimate Partner Violence: Not on file   Housing Stability: Not on file       Objective     Vitals:    04/25/24 0947   BP: 134/80     Wt Readings from Last 3 Encounters:   04/25/24 73.4 kg (161 lb 12.8 oz)   02/08/24 74.8 kg (165 lb)   02/06/24 74.8 kg (165 lb)       Physical Exam  Constitutional:       General: She is not in acute distress.     Appearance: Normal  appearance. She is not ill-appearing.   HENT:      Head: Normocephalic and atraumatic.   Eyes:      General:         Right eye: No discharge.         Left eye: No discharge.      Extraocular Movements: Extraocular movements intact.      Conjunctiva/sclera: Conjunctivae normal.      Pupils: Pupils are equal, round, and reactive to light.   Neck:      Vascular: No carotid bruit.   Cardiovascular:      Rate and Rhythm: Normal rate and regular rhythm.      Heart sounds: Normal heart sounds. No murmur heard.  Pulmonary:      Effort: Pulmonary effort is normal.      Breath sounds: Normal breath sounds. No wheezing, rhonchi or rales.   Abdominal:      General: Abdomen is flat. Bowel sounds are normal. There is no distension.      Palpations: Abdomen is soft.      Tenderness: There is no abdominal tenderness. There is no right CVA tenderness, left CVA tenderness, guarding or rebound.   Musculoskeletal:      Right lower leg: No edema.      Left lower leg: No edema.   Lymphadenopathy:      Cervical: No cervical adenopathy.   Skin:     Findings: No rash.   Neurological:      General: No focal deficit present.      Mental Status: She is alert and oriented to person, place, and time.      Cranial Nerves: No cranial nerve deficit.   Psychiatric:         Mood and Affect: Mood normal.         Behavior: Behavior normal.         Thought Content: Thought content normal.         Judgment: Judgment normal.         Pertinent Laboratory/Diagnostic Studies:  Lab Results   Component Value Date    GLUCOSE 120 11/24/2015    BUN 29 (H) 02/06/2024    CREATININE 1.07 02/06/2024    CALCIUM 10.0 02/06/2024     11/24/2015    K 5.0 02/06/2024    CO2 29 02/06/2024     02/06/2024     Lab Results   Component Value Date    ALT 19 02/06/2024    AST 26 02/06/2024    ALKPHOS 49 02/06/2024    BILITOT 0.30 11/24/2015       Lab Results   Component Value Date    WBC 4.90 02/06/2024    HGB 13.0 02/06/2024    HCT 41.1 02/06/2024    MCV 95 02/06/2024  "    02/06/2024       Lab Results   Component Value Date    TSH 2.07 06/22/2023       No results found for: \"CHOL\"  Lab Results   Component Value Date    TRIG 99 02/06/2024     Lab Results   Component Value Date    HDL 79 02/06/2024     Lab Results   Component Value Date    LDLCALC 143 (H) 02/06/2024     No results found for: \"HGBA1C\"    Results for orders placed or performed in visit on 04/25/24   POCT urine dip   Result Value Ref Range    LEUKOCYTE ESTERASE,     NITRITE,UA negative     SL AMB POCT UROBILINOGEN negative     POCT URINE PROTEIN 15      PH,UA negative     BLOOD,UA positive     SPECIFIC GRAVITY,UA 1.010     KETONES,UA negative     BILIRUBIN,UA negative     GLUCOSE, UA negative      COLOR,UA yellow     CLARITY,UA turbid        Orders Placed This Encounter   Procedures    Urine culture    POCT urine dip       ALLERGIES:  Allergies   Allergen Reactions    Benzocaine Other (See Comments) and Vomiting     anesthesia-not sure of specific drug    Risedronate Other (See Comments)     Other reaction(s): stomach pains   Other reaction(s): Nausea and Vomiting, Stomach upset    Nsaids GI Intolerance       Current Medications     Current Outpatient Medications   Medication Sig Dispense Refill    acetaminophen (TYLENOL) 500 mg tablet Take 500 mg by mouth if needed for mild pain      albuterol (Ventolin HFA) 90 mcg/act inhaler Inhale 2 puffs every 6 (six) hours as needed for wheezing (Coughing fits) Use PRN prior to coughing fits 6.7 g 1    apixaban (Eliquis) 5 mg Take 5 mg by mouth 2 (two) times a day      ascorbic acid (VITAMIN C) 250 MG CHEW Chew 500 mg daily      Cholecalciferol (Vitamin D) 50 MCG (2000 UT) CAPS Take by mouth      Cranberry 1000 MG CAPS Take 2,500 mg by mouth daily      Cranberry 400 MG CAPS Take 400 mg by mouth      famotidine (PEPCID) 40 MG tablet Take 1 tablet (40 mg total) by mouth daily at bedtime 90 tablet 3    ketoconazole (NIZORAL) 2 % shampoo Apply topically once      " Menaquinone-7 (K2 PO) Take 50 mg by mouth daily      Omega 3 1000 MG CAPS Take 1 g by mouth      omega-3-acid ethyl esters (LOVAZA) 1 g capsule Take 1 g by mouth daily      omeprazole (PriLOSEC) 40 MG capsule Take 40 mg by mouth daily      sertraline (ZOLOFT) 50 mg tablet Take 1 tablet (50 mg total) by mouth daily 90 tablet 3    sulfamethoxazole-trimethoprim (BACTRIM DS) 800-160 mg per tablet Take 1 tablet by mouth 2 (two) times a day for 7 days 14 tablet 0    Zinc Gluconate 50 MG CAPS Take 50 mg by mouth      cephalexin (KEFLEX) 500 mg capsule TAKE ONE CAPSULE BY MOUTH TWICE A DAY UNTIL FINISHED (Patient not taking: Reported on 4/25/2024)       No current facility-administered medications for this visit.         Health Maintenance     Health Maintenance   Topic Date Due    Osteoporosis Screening  Never done    Pneumococcal Vaccine: 65+ Years (3 of 3 - PPSV23 or PCV20) 12/08/2020    COVID-19 Vaccine (4 - 2023-24 season) 09/01/2023    Fall Risk  01/25/2025    Depression Screening  01/25/2025    Urinary Incontinence Screening  01/25/2025    Medicare Annual Wellness Visit (AWV)  01/25/2025    Zoster Vaccine  Completed    Influenza Vaccine  Completed    HIB Vaccine  Aged Out    IPV Vaccine  Aged Out    Hepatitis A Vaccine  Aged Out    Meningococcal ACWY Vaccine  Aged Out    HPV Vaccine  Aged Out     Immunization History   Administered Date(s) Administered    COVID-19 J&J (Genomas) vaccine 0.5 mL 04/05/2021, 11/21/2021    COVID-19 Pfizer vac (Qamar-sucrose, gray cap) 12 yr+ IM 05/23/2022    INFLUENZA 11/07/2003, 10/02/2012, 12/19/2022, 11/09/2023    Influenza Split High Dose Preservative Free IM 11/04/2020    Influenza, Seasonal Vaccine, Quadrivalent, Adjuvanted, .5e 12/19/2022    Pneumococcal Conjugate 13-Valent 12/08/2019    Pneumococcal Polysaccharide PPV23 02/02/2005    Zoster Vaccine Recombinant 09/26/2021, 01/20/2022       Garry Henley MD

## 2024-04-25 NOTE — TELEPHONE ENCOUNTER
Patient called in regarding UTI. She was seen at  urgent care on 4/13 and diagnosed with UTI. They gave her cephalexin for 7 days. She completed the entire course and finished that on 4/20. States that she is still having frequency and burning with urination. Please advise.

## 2024-04-27 LAB — BACTERIA UR CULT: ABNORMAL

## 2024-04-29 DIAGNOSIS — N30.01 ACUTE CYSTITIS WITH HEMATURIA: Primary | ICD-10-CM

## 2024-04-29 RX ORDER — NITROFURANTOIN 25; 75 MG/1; MG/1
100 CAPSULE ORAL 2 TIMES DAILY
Qty: 10 CAPSULE | Refills: 0 | Status: SHIPPED | OUTPATIENT
Start: 2024-04-29 | End: 2024-05-04

## 2024-05-25 PROBLEM — N30.01 ACUTE CYSTITIS WITH HEMATURIA: Status: RESOLVED | Noted: 2024-04-25 | Resolved: 2024-05-25

## 2024-05-31 ENCOUNTER — RA CDI HCC (OUTPATIENT)
Dept: OTHER | Facility: HOSPITAL | Age: 84
End: 2024-05-31

## 2024-06-04 ENCOUNTER — TELEPHONE (OUTPATIENT)
Dept: FAMILY MEDICINE CLINIC | Facility: CLINIC | Age: 84
End: 2024-06-04

## 2024-06-04 NOTE — TELEPHONE ENCOUNTER
Gail walked into the office and stated that she was under there Impression she needed to have labs completed before her apt on Friday 6/7/24. I told her that if you needed her to complete anything prior, we will let her know. If not, lab orders will be given at the time of the visit.   Are labs needed before her visit?

## 2024-06-28 ENCOUNTER — OFFICE VISIT (OUTPATIENT)
Dept: FAMILY MEDICINE CLINIC | Facility: CLINIC | Age: 84
End: 2024-06-28
Payer: MEDICARE

## 2024-06-28 VITALS
OXYGEN SATURATION: 97 % | BODY MASS INDEX: 27.26 KG/M2 | TEMPERATURE: 97.6 F | DIASTOLIC BLOOD PRESSURE: 80 MMHG | SYSTOLIC BLOOD PRESSURE: 128 MMHG | RESPIRATION RATE: 16 BRPM | WEIGHT: 163.6 LBS | HEIGHT: 65 IN | HEART RATE: 56 BPM

## 2024-06-28 DIAGNOSIS — G89.29 CHRONIC ANKLE PAIN, BILATERAL: ICD-10-CM

## 2024-06-28 DIAGNOSIS — M25.571 CHRONIC ANKLE PAIN, BILATERAL: ICD-10-CM

## 2024-06-28 DIAGNOSIS — I48.0 PAROXYSMAL ATRIAL FIBRILLATION (HCC): Primary | ICD-10-CM

## 2024-06-28 DIAGNOSIS — G89.29 CHRONIC LOW BACK PAIN WITHOUT SCIATICA, UNSPECIFIED BACK PAIN LATERALITY: ICD-10-CM

## 2024-06-28 DIAGNOSIS — F33.8 SEASONAL AFFECTIVE DISORDER (HCC): ICD-10-CM

## 2024-06-28 DIAGNOSIS — N18.31 STAGE 3A CHRONIC KIDNEY DISEASE (HCC): ICD-10-CM

## 2024-06-28 DIAGNOSIS — C64.2 RENAL CELL CANCER, LEFT (HCC): Chronic | ICD-10-CM

## 2024-06-28 DIAGNOSIS — M54.50 CHRONIC LOW BACK PAIN WITHOUT SCIATICA, UNSPECIFIED BACK PAIN LATERALITY: ICD-10-CM

## 2024-06-28 DIAGNOSIS — M25.572 CHRONIC ANKLE PAIN, BILATERAL: ICD-10-CM

## 2024-06-28 DIAGNOSIS — M81.0 OSTEOPOROSIS, UNSPECIFIED OSTEOPOROSIS TYPE, UNSPECIFIED PATHOLOGICAL FRACTURE PRESENCE: ICD-10-CM

## 2024-06-28 DIAGNOSIS — R05.3 CHRONIC COUGH: ICD-10-CM

## 2024-06-28 PROCEDURE — G2211 COMPLEX E/M VISIT ADD ON: HCPCS | Performed by: FAMILY MEDICINE

## 2024-06-28 PROCEDURE — 99214 OFFICE O/P EST MOD 30 MIN: CPT | Performed by: FAMILY MEDICINE

## 2024-06-28 NOTE — PATIENT INSTRUCTIONS
Let's try diclofenac gel/topical anti-inflammatory to your back along with Tylenol arthritis as needed at night.  If this regimen is helpful you can stay on that.  If it does not work-please call me back and we will try low-dose of muscle relaxant   Flu vaccine-September

## 2024-06-28 NOTE — PROGRESS NOTES
Ambulatory Visit  Name: Gail Bradshaw      : 1940      MRN: [id number]  Encounter Provider: Vicky Wilson MD  Encounter Date: 2024   Encounter department: [facility]    Assessment & Plan   1. Paroxysmal atrial fibrillation (HCC)  Assessment & Plan:  Anticoagulated with Eliquis  Patient mike under care of [facility] cardiology,   No rate controlling Rx - occasional palpitation, no dizziness or dyspnea or chest pain.  Patient will discuss at forthcoming appointment with cardiology in a few months.  2. Osteoporosis, unspecified osteoporosis type, unspecified pathological fracture presence  Assessment & Plan:  Prtefers  to hold Off  Rx  3. Chronic cough  Assessment & Plan:  Symptoms of chronic cough have resolved.    Current regimen includes:  Omeprazole 40 mg daily   Pepcid 40 mg qhs    4. Chronic low back pain without sciatica, unspecified back pain laterality  -     Diclofenac Sodium (VOLTAREN) 1 %; Apply 4 g topically 3 (three) times a day as needed (knee pain/ back pain)  5. Chronic ankle pain, bilateral  -     Ambulatory referral to Podiatry; Future  6. Stage 3a chronic kidney disease (HCC)  Assessment & Plan:  Lab Results   Component Value Date    EGFR 48 2024    EGFR 48 (L) 10/20/2023    EGFR 52 (L) 2023    CREATININE 1.07 2024    CREATININE 1.13 (H) 10/20/2023    CREATININE 1.07 2023   Continue monitoring.  Stable  7. Renal cell cancer, left (HCC)  Assessment & Plan:  They have [facility] follows  8. Seasonal affective disorder (HCC)  Assessment & Plan:  Doing well on Zoloft 50 mg daily       Patient Instructions   Let's try diclofenac gel/topical anti-inflammatory to your back along with Tylenol arthritis as needed at night.  If this regimen is helpful you can stay on that.  If it does not work-please call me back and we will try low-dose of muscle relaxant   Flu vaccine-September    Return in about 7 months (around 2025) for  Medicare wellness, follow up.      History of Present Illness     The patient presents for follow-up.  She was recently treated for for UTI    4/24- [facility]   Then    Then , urology  Took 3 antibiotics including Keflex, then Bactrim and then subsequently Cipro.  Cipro caused GI side effects but UTI symptoms have resolved.  Patient with chronic vaginal dryness, uses estrogen cream twice a week.  Chronic cough has resolved after trial of Pepcid.  She remains on PPI in the morning and H2 blocker at night.  She was seen by  [facility] but has no further follow-ups.  History of osteoporosis: Patient declines further testing or treatment.  Hyperlipidemia: Patient declines medications for elevated cholesterol.  She reports very infrequent palpitations.  No associated dizziness, chest pain or dyspnea.  She has pending follow-up with cardiology in the few months.  She remains on Eliquis for chronic A-fib.    Chronic low back pain, unchanged for years.  No sciatica.  Described as nagging, worse after standing all day long.  No relief with lidocaine patches.  Patient has been exercising/swimming lately.  Back feels better in the pool.    Chronic arthritic ankle and foot pain.      Review of Systems   Constitutional: Negative.    HENT: Negative.     Respiratory: Negative.     Cardiovascular:  Positive for palpitations.        Infrequent, as per HPI   Endocrine: Negative.    Genitourinary: Negative.    Musculoskeletal:  Positive for arthralgias and back pain.   Skin: Negative.    Allergic/Immunologic: Negative.    Neurological: Negative.    Hematological: Negative.    Psychiatric/Behavioral: Negative.       Past Medical History:   Diagnosis Date   • E coli bacteremia    • Pyelonephritis    • Sepsis due to Escherichia coli without acute organ dysfunction (HCC)      Past Surgical History:   Procedure Laterality Date   • HEMORROIDECTOMY     • REPLACEMENT TOTAL KNEE Right 2004   • TOTAL HIP ARTHROPLASTY Right 2006      Family History   Problem Relation Age of Onset   • Pancreatic cancer Brother      Social History     Tobacco Use   • Smoking status: Never   • Smokeless tobacco: Never   Vaping Use   • Vaping status: Never Used   Substance and Sexual Activity   • Alcohol use: Not Currently   • Drug use: No   • Sexual activity: Not on file     Current Outpatient Medications on File Prior to Visit   Medication Sig   • acetaminophen (TYLENOL) 500 mg tablet Take 500 mg by mouth if needed for mild pain   • apixaban (Eliquis) 5 mg Take 5 mg by mouth 2 (two) times a day   • ascorbic acid (VITAMIN C) 250 MG CHEW Chew 500 mg daily   • Cholecalciferol (Vitamin D) 50 MCG (2000 UT) CAPS Take by mouth   • Cranberry 1000 MG CAPS Take 2,500 mg by mouth daily   • Cranberry 400 MG CAPS Take 400 mg by mouth   • famotidine (PEPCID) 40 MG tablet Take 1 tablet (40 mg total) by mouth daily at bedtime   • ketoconazole (NIZORAL) 2 % shampoo Apply topically once   • Menaquinone-7 (K2 PO) Take 50 mg by mouth daily   • Omega 3 1000 MG CAPS Take 1 g by mouth   • omega-3-acid ethyl esters (LOVAZA) 1 g capsule Take 1 g by mouth daily   • omeprazole (PriLOSEC) 40 MG capsule Take 40 mg by mouth daily   • sertraline (ZOLOFT) 50 mg tablet Take 1 tablet (50 mg total) by mouth daily   • Zinc Gluconate 50 MG CAPS Take 50 mg by mouth   • albuterol (Ventolin HFA) 90 mcg/act inhaler Inhale 2 puffs every 6 (six) hours as needed for wheezing (Coughing fits) Use PRN prior to coughing fits (Patient not taking: Reported on 6/28/2024)     Allergies   Allergen Reactions   • Benzocaine Other (See Comments) and Vomiting     anesthesia-not sure of specific drug   • Ciprofloxacin Abdominal Pain     Able to take   • Risedronate Other (See Comments)     Other reaction(s): stomach pains   Other reaction(s): Nausea and Vomiting, Stomach upset   • Nsaids GI Intolerance     Immunization History   Administered Date(s) Administered   • COVID-19 J&J ([employer]ense) vaccine 0.5 mL 04/05/2021,  "11/21/2021   • COVID-19 Pfizer vac (Qamar-sucrose, gray cap) 12 yr+ IM 05/23/2022   • INFLUENZA 11/07/2003, 10/02/2012, 12/19/2022, 11/09/2023   • Influenza Split High Dose Preservative Free IM 11/04/2020   • Influenza, Seasonal Vaccine, Quadrivalent, Adjuvanted, .5e 12/19/2022   • Pneumococcal Conjugate 13-Valent 12/08/2019   • Pneumococcal Polysaccharide PPV23 02/02/2005   • Zoster Vaccine Recombinant 09/26/2021, 01/20/2022     Objective     /80 (BP Location: Left arm, Patient Position: Sitting, Cuff Size: Standard)   Pulse 56   Temp 97.6 °F (36.4 °C) (Temporal)   Resp 16   Ht 5' 5\" (1.651 m)   Wt 74.2 kg (163 lb 9.6 oz)   SpO2 97%   BMI 27.22 kg/m²     Physical Exam  Vitals and nursing note reviewed.   Constitutional:       General: She is not in acute distress.     Appearance: Normal appearance. She is well-developed. She is not ill-appearing.   HENT:      Head: Normocephalic and atraumatic.   Eyes:      Conjunctiva/sclera: Conjunctivae normal.   Neck:      Thyroid: No thyromegaly.      Vascular: No carotid bruit.   Cardiovascular:      Rate and Rhythm: Normal rate. Rhythm irregular.      Heart sounds: Normal heart sounds. No murmur heard.  Pulmonary:      Effort: Pulmonary effort is normal. No respiratory distress.      Breath sounds: Normal breath sounds. No wheezing.   Abdominal:      General: There is no abdominal bruit.   Musculoskeletal:         General: Normal range of motion.      Cervical back: Neck supple.      Right lower leg: No edema.      Left lower leg: No edema.      Comments: Tenderness L4-L5 L5-S1, paraspinal spasm   Neurological:      General: No focal deficit present.      Mental Status: She is alert and oriented to person, place, and time.      Cranial Nerves: No cranial nerve deficit.      Coordination: Coordination normal.   Psychiatric:         Mood and Affect: Mood normal.         Behavior: Behavior normal.       Administrative Statements         "

## 2024-06-28 NOTE — ASSESSMENT & PLAN NOTE
Anticoagulated with Eliquis  Patient mike under care of [facility] cardiology,   No rate controlling Rx - occasional palpitation, no dizziness or dyspnea or chest pain.  Patient will discuss at forthcoming appointment with cardiology in a few months.

## 2024-06-28 NOTE — ASSESSMENT & PLAN NOTE
Symptoms of chronic cough have resolved.    Current regimen includes:  Omeprazole 40 mg daily   Pepcid 40 mg qhs

## 2024-07-01 NOTE — ASSESSMENT & PLAN NOTE
Lab Results   Component Value Date    EGFR 48 02/06/2024    EGFR 48 (L) 10/20/2023    EGFR 52 (L) 06/22/2023    CREATININE 1.07 02/06/2024    CREATININE 1.13 (H) 10/20/2023    CREATININE 1.07 06/22/2023   Continue monitoring.  Stable

## 2024-07-15 DIAGNOSIS — K21.9 CHRONIC GERD: Primary | ICD-10-CM

## 2024-07-16 RX ORDER — OMEPRAZOLE 40 MG/1
40 CAPSULE, DELAYED RELEASE ORAL DAILY
Qty: 90 CAPSULE | Refills: 3 | Status: SHIPPED | OUTPATIENT
Start: 2024-07-16

## 2024-08-30 ENCOUNTER — TELEPHONE (OUTPATIENT)
Dept: FAMILY MEDICINE CLINIC | Facility: CLINIC | Age: 84
End: 2024-08-30

## 2024-08-30 DIAGNOSIS — L21.9 SEBORRHEA: Primary | ICD-10-CM

## 2024-08-30 RX ORDER — KETOCONAZOLE 20 MG/ML
1 SHAMPOO TOPICAL 2 TIMES WEEKLY
Qty: 120 ML | Refills: 1 | Status: SHIPPED | OUTPATIENT
Start: 2024-09-02

## 2024-11-25 ENCOUNTER — OFFICE VISIT (OUTPATIENT)
Dept: FAMILY MEDICINE CLINIC | Facility: CLINIC | Age: 84
End: 2024-11-25
Payer: MEDICARE

## 2024-11-25 VITALS
SYSTOLIC BLOOD PRESSURE: 128 MMHG | TEMPERATURE: 97.5 F | OXYGEN SATURATION: 97 % | BODY MASS INDEX: 26.59 KG/M2 | WEIGHT: 159.6 LBS | DIASTOLIC BLOOD PRESSURE: 78 MMHG | HEART RATE: 51 BPM | RESPIRATION RATE: 16 BRPM | HEIGHT: 65 IN

## 2024-11-25 DIAGNOSIS — M51.360 DEGENERATION OF INTERVERTEBRAL DISC OF LUMBAR REGION WITH DISCOGENIC BACK PAIN: ICD-10-CM

## 2024-11-25 DIAGNOSIS — S32.040G CLOSED COMPRESSION FRACTURE OF L4 LUMBAR VERTEBRA WITH DELAYED HEALING, SUBSEQUENT ENCOUNTER: Primary | ICD-10-CM

## 2024-11-25 PROCEDURE — 99213 OFFICE O/P EST LOW 20 MIN: CPT | Performed by: FAMILY MEDICINE

## 2024-11-25 PROCEDURE — G2211 COMPLEX E/M VISIT ADD ON: HCPCS | Performed by: FAMILY MEDICINE

## 2024-11-25 RX ORDER — GABAPENTIN 100 MG/1
CAPSULE ORAL
Qty: 60 CAPSULE | Refills: 1 | Status: SHIPPED | OUTPATIENT
Start: 2024-11-25

## 2024-11-25 NOTE — PROGRESS NOTES
Name: Gail Bradshaw      : 1940      MRN: [id number]  Encounter Provider: Vicky Wilson MD  Encounter Date: 2024   Encounter department: [facility]    Assessment & Plan  Closed compression fracture of L4 lumbar vertebra with delayed healing, subsequent encounter  The patient with history of osteoporosis presents for follow-up after mechanical fall at home on 2024.  She was evaluated urgent care center and had x-rays concerning for possible L4 compression deformity that could represent acute fracture.  There is no reproducible spine tenderness on exam.  Nevertheless, we will proceed with MRI of lumbar spine for further evaluation.  Advised patient to schedule follow-up with spine orthopedic surgery.  Continue Tylenol, add low-dose gabapentin at night.  Avoid NSAIDs.  Orders:    MRI lumbar spine wo contrast; Future    Ambulatory referral to Orthopedic Surgery; Future    gabapentin (NEURONTIN) 100 mg capsule; Take 1 to 2 capsules at bedtime    Degeneration of intervertebral disc of lumbar region with discogenic back pain    Orders:    MRI lumbar spine wo contrast; Future    Ambulatory referral to Orthopedic Surgery; Future         History of Present Illness     Patient presents for follow-up after recent evaluation with urgent care center on 2024.  She sustained mechanical fall at home on .  Patient was rolling back on her wheelchair and tripped over the step going down to her kitchen.  She fell backwards on her buttocks and lower back.  No head strike, no LOC.      Patient had x-ray performed at the urgent care center/[facility] revealin.Superior endplate compression deformity of the L4 vertebral body with what   appears to be some cortical irregularity of the anterosuperior aspect of the L4 vertebral body suspicious for acute fracture. Correlate with point tenderness.   2. Multilevel moderate to severe degenerative changes of the lumbar spine. Levoscoliosis of  the lumbar spine. Grade 1 anterolisthesis of L4 on L5 again seen.     Patient with history of osteoporosis.  She reports left-sided low back pain, no sciatica.  She has been wearing a brace.  Patient is planning to schedule follow-up with orthopedic surgery as per recommendation of urgent care center.  She has been using Tylenol and/or Tylenol arthritis for pain.        Review of Systems   Constitutional: Negative.    HENT: Negative.     Respiratory: Negative.     Cardiovascular: Negative.    Gastrointestinal: Negative.    Genitourinary: Negative.    Musculoskeletal:  Positive for back pain.     Past Medical History:   Diagnosis Date    E coli bacteremia 6/30/2020    Pyelonephritis 6/27/2020    Sepsis due to Escherichia coli without acute organ dysfunction (HCC) 6/27/2020     Past Surgical History:   Procedure Laterality Date    HEMORROIDECTOMY      REPLACEMENT TOTAL KNEE Right 2004    TOTAL HIP ARTHROPLASTY Right 2006     Family History   Problem Relation Age of Onset    Pancreatic cancer Brother      Social History     Tobacco Use    Smoking status: Never    Smokeless tobacco: Never   Vaping Use    Vaping status: Never Used   Substance and Sexual Activity    Alcohol use: Not Currently    Drug use: No    Sexual activity: Not on file     Current Outpatient Medications on File Prior to Visit   Medication Sig    acetaminophen (TYLENOL) 500 mg tablet Take 500 mg by mouth if needed for mild pain    apixaban (Eliquis) 5 mg Take 5 mg by mouth 2 (two) times a day    ascorbic acid (VITAMIN C) 250 MG CHEW Chew 500 mg daily    Cholecalciferol (Vitamin D) 50 MCG (2000 UT) CAPS Take by mouth    Cranberry 1000 MG CAPS Take 2,500 mg by mouth daily    Cranberry 400 MG CAPS Take 400 mg by mouth    Diclofenac Sodium (VOLTAREN) 1 % Apply 4 g topically 3 (three) times a day as needed (knee pain/ back pain)    famotidine (PEPCID) 40 MG tablet Take 1 tablet (40 mg total) by mouth daily at bedtime    ketoconazole (NIZORAL) 2 % shampoo  "Apply 1 Application topically 2 (two) times a week    Menaquinone-7 (K2 PO) Take 50 mg by mouth daily    omega-3-acid ethyl esters (LOVAZA) 1 g capsule Take 1 g by mouth daily    omeprazole (PriLOSEC) 40 MG capsule Take 1 capsule (40 mg total) by mouth daily    sertraline (ZOLOFT) 50 mg tablet Take 1 tablet (50 mg total) by mouth daily    Zinc Gluconate 50 MG CAPS Take 50 mg by mouth    albuterol (Ventolin HFA) 90 mcg/act inhaler Inhale 2 puffs every 6 (six) hours as needed for wheezing (Coughing fits) Use PRN prior to coughing fits (Patient not taking: Reported on 6/28/2024)    Omega 3 1000 MG CAPS Take 1 g by mouth (Patient not taking: Reported on 11/25/2024)     Allergies   Allergen Reactions    Benzocaine Other (See Comments) and Vomiting     anesthesia-not sure of specific drug    Ciprofloxacin Abdominal Pain     Able to take    Risedronate Other (See Comments)     Other reaction(s): stomach pains   Other reaction(s): Nausea and Vomiting, Stomach upset    Nsaids GI Intolerance     Immunization History   Administered Date(s) Administered    COVID-19 J&J ([employer]) vaccine 0.5 mL 04/05/2021, 11/21/2021    COVID-19 Pfizer vac (Qamar-sucrose, gray cap) 12 yr+ IM 05/23/2022    INFLUENZA 11/07/2003, 10/02/2012, 12/19/2022, 11/09/2023    Influenza Split High Dose Preservative Free IM 11/04/2020    Influenza, Seasonal Vaccine, Quadrivalent, Adjuvanted, .5e 12/19/2022    Pneumococcal Conjugate 13-Valent 12/08/2019    Pneumococcal Polysaccharide PPV23 02/02/2005    Zoster Vaccine Recombinant 09/26/2021, 01/20/2022    influenza, trivalent, adjuvanted 09/08/2024     Objective   /78 (BP Location: Left arm, Patient Position: Sitting, Cuff Size: Standard)   Pulse (!) 51   Temp 97.5 °F (36.4 °C) (Temporal)   Resp 16   Ht 5' 5\" (1.651 m)   Wt 72.4 kg (159 lb 9.6 oz)   SpO2 97%   BMI 26.56 kg/m²     Physical Exam  Vitals and nursing note reviewed.   Constitutional:       Appearance: Normal appearance. "   Musculoskeletal:      Comments: Ambulates independently, mild antalgia.  Paraspinal spasm and tenderness L4-L5 L5-S1 on the left.  No significant mid spine tenderness on palpation.   Neurological:      General: No focal deficit present.      Mental Status: She is alert and oriented to person, place, and time.   Psychiatric:         Mood and Affect: Mood normal.         Behavior: Behavior normal.         Thought Content: Thought content normal.

## 2024-11-28 PROBLEM — S32.040A CLOSED COMPRESSION FRACTURE OF FOURTH LUMBAR VERTEBRA (HCC): Status: ACTIVE | Noted: 2024-11-28

## 2024-11-28 PROBLEM — M51.360 DEGENERATION OF INTERVERTEBRAL DISC OF LUMBAR REGION WITH DISCOGENIC BACK PAIN: Status: ACTIVE | Noted: 2024-11-28

## 2024-11-28 NOTE — ASSESSMENT & PLAN NOTE
Orders:  •  MRI lumbar spine wo contrast; Future  •  Ambulatory referral to Orthopedic Surgery; Future

## 2024-11-28 NOTE — ASSESSMENT & PLAN NOTE
The patient with history of osteoporosis presents for follow-up after mechanical fall at home on 11/7/2024.  She was evaluated urgent care center and had x-rays concerning for possible L4 compression deformity that could represent acute fracture.  There is no reproducible spine tenderness on exam.  Nevertheless, we will proceed with MRI of lumbar spine for further evaluation.  Advised patient to schedule follow-up with spine orthopedic surgery.  Continue Tylenol, add low-dose gabapentin at night.  Avoid NSAIDs.  Orders:    MRI lumbar spine wo contrast; Future    Ambulatory referral to Orthopedic Surgery; Future    gabapentin (NEURONTIN) 100 mg capsule; Take 1 to 2 capsules at bedtime

## 2024-12-13 ENCOUNTER — HOSPITAL ENCOUNTER (OUTPATIENT)
Dept: RADIOLOGY | Age: 84
Discharge: HOME | End: 2024-12-13
Payer: MEDICARE

## 2024-12-13 DIAGNOSIS — M51.360 DEGENERATION OF INTERVERTEBRAL DISC OF LUMBAR REGION WITH DISCOGENIC BACK PAIN: ICD-10-CM

## 2024-12-13 DIAGNOSIS — S32.040G CLOSED COMPRESSION FRACTURE OF L4 LUMBAR VERTEBRA WITH DELAYED HEALING, SUBSEQUENT ENCOUNTER: ICD-10-CM

## 2024-12-13 PROCEDURE — 72148 MRI LUMBAR SPINE W/O DYE: CPT

## 2024-12-16 ENCOUNTER — TELEPHONE (OUTPATIENT)
Dept: FAMILY MEDICINE CLINIC | Facility: CLINIC | Age: 84
End: 2024-12-16

## 2024-12-16 NOTE — TELEPHONE ENCOUNTER
Lumbar spine MRI results reviewed    There is a subacute compression fracture of the L4 superior endplate with approximately 25% loss of height of the vertebral body and mild marrow edema. No prior imaging at this institution for comparison.     Moderate levoscoliosis with multilevel lumbar degenerative change. Mild canal stenosis. Moderate distal left foraminal narrowing due to far lateral disc and endplate hypertrophic changes

## 2024-12-16 NOTE — TELEPHONE ENCOUNTER
Please contact the patient.  I reviewed results of low back MRI    It indicates subacute fracture of lumbar 4 vertebrae    It also indicates prominent arthritis, stenosis, scoliosis and disc degeneration in her lower back.    As per our discussion during last office visit, patient should be evaluated by orthopedic spine surgery.  The patient was planning to schedule visit with either Dr. Amato at [facility] or Dr. Ferguson at [facility].    It is very important that she sees one of them and discusses results of MRI    Thank you

## 2024-12-17 NOTE — TELEPHONE ENCOUNTER
Called patient daughter Loretta at [phone] is on Communication Consent and relay MRI results and provider message/instructions.  Unable to contact the patient.

## 2024-12-29 DIAGNOSIS — K21.9 CHRONIC GERD: ICD-10-CM

## 2024-12-30 RX ORDER — FAMOTIDINE 40 MG/1
40 TABLET, FILM COATED ORAL
Qty: 90 TABLET | Refills: 1 | Status: SHIPPED | OUTPATIENT
Start: 2024-12-30

## 2025-01-31 DIAGNOSIS — S32.040G CLOSED COMPRESSION FRACTURE OF L4 LUMBAR VERTEBRA WITH DELAYED HEALING, SUBSEQUENT ENCOUNTER: ICD-10-CM

## 2025-01-31 RX ORDER — GABAPENTIN 100 MG/1
100-200 CAPSULE ORAL
Qty: 60 CAPSULE | Refills: 5 | Status: SHIPPED | OUTPATIENT
Start: 2025-01-31

## 2025-02-03 ENCOUNTER — RA CDI HCC (OUTPATIENT)
Dept: OTHER | Facility: HOSPITAL | Age: 85
End: 2025-02-03

## 2025-02-10 ENCOUNTER — OFFICE VISIT (OUTPATIENT)
Dept: FAMILY MEDICINE CLINIC | Facility: CLINIC | Age: 85
End: 2025-02-10
Payer: MEDICARE

## 2025-02-10 VITALS
RESPIRATION RATE: 16 BRPM | TEMPERATURE: 97.3 F | SYSTOLIC BLOOD PRESSURE: 136 MMHG | BODY MASS INDEX: 25.62 KG/M2 | WEIGHT: 153.8 LBS | DIASTOLIC BLOOD PRESSURE: 84 MMHG | OXYGEN SATURATION: 98 % | HEART RATE: 110 BPM | HEIGHT: 65 IN

## 2025-02-10 DIAGNOSIS — S32.040D CLOSED COMPRESSION FRACTURE OF L4 LUMBAR VERTEBRA WITH ROUTINE HEALING, SUBSEQUENT ENCOUNTER: ICD-10-CM

## 2025-02-10 DIAGNOSIS — R53.83 OTHER FATIGUE: ICD-10-CM

## 2025-02-10 DIAGNOSIS — C64.2 RENAL CELL CANCER, LEFT (HCC): ICD-10-CM

## 2025-02-10 DIAGNOSIS — N18.31 STAGE 3A CHRONIC KIDNEY DISEASE (HCC): ICD-10-CM

## 2025-02-10 DIAGNOSIS — F33.8 SEASONAL AFFECTIVE DISORDER (HCC): ICD-10-CM

## 2025-02-10 DIAGNOSIS — M81.0 OSTEOPOROSIS, UNSPECIFIED OSTEOPOROSIS TYPE, UNSPECIFIED PATHOLOGICAL FRACTURE PRESENCE: ICD-10-CM

## 2025-02-10 DIAGNOSIS — Z00.00 MEDICARE ANNUAL WELLNESS VISIT, SUBSEQUENT: Primary | ICD-10-CM

## 2025-02-10 DIAGNOSIS — E55.9 VITAMIN D DEFICIENCY: ICD-10-CM

## 2025-02-10 DIAGNOSIS — Z78.0 MENOPAUSE: ICD-10-CM

## 2025-02-10 DIAGNOSIS — I48.0 PAROXYSMAL ATRIAL FIBRILLATION (HCC): ICD-10-CM

## 2025-02-10 PROCEDURE — 99214 OFFICE O/P EST MOD 30 MIN: CPT | Performed by: FAMILY MEDICINE

## 2025-02-10 PROCEDURE — G2211 COMPLEX E/M VISIT ADD ON: HCPCS | Performed by: FAMILY MEDICINE

## 2025-02-10 PROCEDURE — G0439 PPPS, SUBSEQ VISIT: HCPCS | Performed by: FAMILY MEDICINE

## 2025-02-10 RX ORDER — ESTRADIOL 0.1 MG/G
CREAM VAGINAL
COMMUNITY

## 2025-02-10 RX ORDER — AMOXICILLIN 500 MG/1
TABLET, FILM COATED ORAL
COMMUNITY

## 2025-02-10 RX ORDER — CHLORHEXIDINE GLUCONATE ORAL RINSE 1.2 MG/ML
SOLUTION DENTAL
COMMUNITY

## 2025-02-10 NOTE — ASSESSMENT & PLAN NOTE
History of osteoporosis.  Proceed with up-to-date DEXA scan.  Orders:  •  DXA bone density spine hip and pelvis; Future  •  Ambulatory referral to Endocrinology; Future

## 2025-02-10 NOTE — ASSESSMENT & PLAN NOTE
Patient follows with orthopedic surgery at [facility].  Back pain has improved, did not resolve.  She is on gabapentin 100 mg nightly, I recommend to increase dose to 100 mg at dinner and 100 mg at night.  Reseed with DEXA scan.  Referral to endocrinology to discuss osteoporosis treatment options.  Orders:  •  DXA bone density spine hip and pelvis; Future  •  Ambulatory referral to Endocrinology; Future

## 2025-02-10 NOTE — PATIENT INSTRUCTIONS
10 to 12-hour fasting blood work, please proceed anytime now  Please schedule bone density scan, [phone]  Referral to endocrinology to discuss results of bone density and treatment of osteoporosis  Please take 1 capsule of gabapentin at dinner and second capsule of gabapentin at night

## 2025-02-10 NOTE — ASSESSMENT & PLAN NOTE
Lab Results   Component Value Date    EGFR 48 02/06/2024    EGFR 48 (L) 10/20/2023    EGFR 52 (L) 06/22/2023    CREATININE 1.07 02/06/2024    CREATININE 1.13 (H) 10/20/2023    CREATININE 1.07 06/22/2023   Proceed with blood work  Orders:  •  CBC and differential; Future  •  Comprehensive metabolic panel; Future  •  Lipid Panel with Direct LDL reflex; Future  •  TSH, 3rd generation; Future

## 2025-02-10 NOTE — PROGRESS NOTES
Name: Gail Bradshaw      : 1940      MRN: [id number]  Encounter Provider: Vicky Wilson MD  Encounter Date: 2/10/2025   Encounter department: [facility]    Assessment & Plan  Medicare annual wellness visit, subsequent         Closed compression fracture of L4 lumbar vertebra with routine healing, subsequent encounter  [facility].  Back pain has improved, did not resolve.  She is on gabapentin 100 mg nightly, I recommend to increase dose to 100 mg at dinner and 100 mg at night.  Reseed with DEXA scan.  Referral to endocrinology to discuss osteoporosis treatment options.  Orders:  •  DXA bone density spine hip and pelvis; Future  •  Ambulatory referral to Endocrinology; Future    Osteoporosis, unspecified osteoporosis type, unspecified pathological fracture presence  History of osteoporosis.  Proceed with up-to-date DEXA scan.  Orders:  •  DXA bone density spine hip and pelvis; Future  •  Ambulatory referral to Endocrinology; Future    Menopause    Orders:  •  DXA bone density spine hip and pelvis; Future    Renal cell cancer, left (HCC)  [facility] urology follows, annual visit in October         Paroxysmal atrial fibrillation (HCC)  On Eliquis, follows with cardiology, asymptomatic       Stage 3a chronic kidney disease (HCC)  Lab Results   Component Value Date    EGFR 48 2024    EGFR 48 (L) 10/20/2023    EGFR 52 (L) 2023    CREATININE 1.07 2024    CREATININE 1.13 (H) 10/20/2023    CREATININE 1.07 2023   Proceed with blood work  Orders:  •  CBC and differential; Future  •  Comprehensive metabolic panel; Future  •  Lipid Panel with Direct LDL reflex; Future  •  TSH, 3rd generation; Future    Vitamin D deficiency    Orders:  •  Vitamin D 25 hydroxy; Future    Other fatigue    Orders:  •  TSH, 3rd generation; Future    Seasonal affective disorder (HCC)  Symptoms are well-controlled on sertraline 50 mg daily          Preventive  health issues were discussed with patient, and age appropriate screening tests were ordered as noted in patient's After Visit Summary. Personalized health advice and appropriate referrals for health education or preventive services given if needed, as noted in patient's After Visit Summary.    Return in about 6 months (around 8/10/2025).    Patient Instructions   10 to 12-hour fasting blood work, please proceed anytime now  Please schedule bone density scan,   Referral to endocrinology to discuss results of bone density and treatment of osteoporosis  Please take 1 capsule of gabapentin at dinner and second capsule of gabapentin at night        History of Present Illness     Follow-up chronic medical conditions  Back pain has improved.  It usually worsens after physical activity and improves with rest.  Patient had recent follow-up with [facility], Dr. Ferguson for evaluation of lumbar compression fracture.  No indication for surgery/procedures.  Patient will follow-up in a few months.    She has been feeling generally stably.  Offers no complaints of chest pain, palpitations, shortness of breath or dizziness.  Known A-fib.  She is asymptomatic.              Patient Care Team:  Vicky Wilson MD as PCP - General (Family Medicine)  Shruthi Ward as Care Manager (Care Coordination)    Review of Systems   Constitutional: Negative.    HENT: Negative.     Respiratory: Negative.     Cardiovascular: Negative.    Gastrointestinal: Negative.    Genitourinary: Negative.    Musculoskeletal:  Positive for arthralgias and back pain.   Skin: Negative.    Neurological: Negative.    Hematological: Negative.    Psychiatric/Behavioral: Negative.       Medical History Reviewed by provider this encounter:  Tobacco  Allergies  Meds  Problems  Med Hx  Surg Hx  Fam Hx       Annual Wellness Visit Questionnaire   Gail is here for her Subsequent Wellness visit. Last Medicare Wellness  visit information reviewed, patient interviewed and updates made to the record today.      Health Risk Assessment:   Patient rates overall health as good. Patient feels that their physical health rating is same. Patient is satisfied with their life. Eyesight was rated as same. Hearing was rated as same. Patient feels that their emotional and mental health rating is same. Patients states they are never, rarely angry. Patient states they are never, rarely unusually tired/fatigued. Pain experienced in the last 7 days has been some. Patient's pain rating has been 6/10. Patient states that she has experienced no weight loss or gain in last 6 months. Feet pain.     Depression Screening:   PHQ-9 Score: 0      Fall Risk Screening:   In the past year, patient has experienced: no history of falling in past year      Urinary Incontinence Screening:   Patient has leaked urine accidently in the last six months.     Home Safety:  Patient does not have trouble with stairs inside or outside of their home. Patient has working smoke alarms and has working carbon monoxide detector. Home safety hazards include: none.     Nutrition:   Current diet is Regular.     Medications:   Patient is currently taking over-the-counter supplements. OTC medications include: see medication list. Patient is able to manage medications.     Activities of Daily Living (ADLs)/Instrumental Activities of Daily Living (IADLs):   Walk and transfer into and out of bed and chair?: Yes  Dress and groom yourself?: Yes    Bathe or shower yourself?: Yes    Feed yourself? Yes  Do your laundry/housekeeping?: Yes  Manage your money, pay your bills and track your expenses?: Yes  Make your own meals?: Yes    Do your own shopping?: Yes    Previous Hospitalizations:   Any hospitalizations or ED visits within the last 12 months?: No      Advance Care Planning:   Living will: Yes    Durable POA for healthcare: Yes    Advanced directive: Yes      Cognitive Screening:    Provider or family/friend/caregiver concerned regarding cognition?: No    PREVENTIVE SCREENINGS      Cardiovascular Screening:    General: Screening Current    Due for: Lipid Panel      Diabetes Screening:     General: Risks and Benefits Discussed    Due for: Blood Glucose      Colorectal Cancer Screening:     General: Screening Not Indicated and Patient Declines      Breast Cancer Screening:     General: Screening Not Indicated      Cervical Cancer Screening:    General: Screening Not Indicated      Osteoporosis Screening:    General: Screening Not Indicated and History Osteoporosis      Abdominal Aortic Aneurysm (AAA) Screening:        General: Screening Not Indicated      Lung Cancer Screening:     General: Screening Not Indicated      Hepatitis C Screening:    General: Screening Not Indicated    Screening, Brief Intervention, and Referral to Treatment (SBIRT)       Brief Intervention  Alcohol & drug use screenings were reviewed. No concerns regarding substance use disorder identified.     Other Counseling Topics:   Regular weightbearing exercise and calcium and vitamin D intake.     Social Drivers of Health     Financial Resource Strain: Low Risk  (1/25/2024)    Overall Financial Resource Strain (CARDIA)    • Difficulty of Paying Living Expenses: Not hard at all   Food Insecurity: No Food Insecurity (2/10/2025)    Hunger Vital Sign    • Worried About Running Out of Food in the Last Year: Never true    • Ran Out of Food in the Last Year: Never true   Transportation Needs: No Transportation Needs (2/10/2025)    PRAPARE - Transportation    • Lack of Transportation (Medical): No    • Lack of Transportation (Non-Medical): No   Housing Stability: Low Risk  (2/10/2025)    Housing Stability Vital Sign    • Unable to Pay for Housing in the Last Year: No    • Number of Times Moved in the Last Year: 1    • Homeless in the Last Year: No   Utilities: Not At Risk (2/10/2025)    [facility] Utilities    • Threatened with loss of  "utilities: No     No results found.    Objective   /84 (BP Location: Left arm, Patient Position: Sitting, Cuff Size: Standard)   Pulse (!) 110   Temp (!) 97.3 °F (36.3 °C) (Temporal)   Resp 16   Ht 5' 5\" (1.651 m)   Wt 69.8 kg (153 lb 12.8 oz)   LMP  (LMP Unknown)   SpO2 98%   BMI 25.59 kg/m²     Physical Exam  Vitals and nursing note reviewed.   Constitutional:       General: She is not in acute distress.     Appearance: Normal appearance. She is well-developed. She is not ill-appearing.   HENT:      Head: Normocephalic and atraumatic.   Eyes:      Conjunctiva/sclera: Conjunctivae normal.   Neck:      Thyroid: No thyromegaly.      Vascular: No carotid bruit.   Cardiovascular:      Rate and Rhythm: Normal rate. Rhythm irregularly irregular.      Heart sounds: Normal heart sounds. No murmur heard.     Comments: HR 90-96 bpm  Pulmonary:      Effort: Pulmonary effort is normal. No respiratory distress.      Breath sounds: Normal breath sounds. No wheezing.   Abdominal:      General: There is no abdominal bruit.   Musculoskeletal:         General: Normal range of motion.      Cervical back: Neck supple.   Neurological:      General: No focal deficit present.      Mental Status: She is alert and oriented to person, place, and time.      Cranial Nerves: No cranial nerve deficit.      Coordination: Coordination normal.   Psychiatric:         Mood and Affect: Mood normal.         Behavior: Behavior normal.         "

## 2025-02-13 PROBLEM — R41.89 COGNITIVE IMPAIRMENT: Status: RESOLVED | Noted: 2023-12-11 | Resolved: 2025-02-13

## 2025-02-25 ENCOUNTER — OFFICE VISIT (OUTPATIENT)
Dept: FAMILY MEDICINE CLINIC | Facility: CLINIC | Age: 85
End: 2025-02-25
Payer: MEDICARE

## 2025-02-25 VITALS
OXYGEN SATURATION: 98 % | HEART RATE: 113 BPM | HEIGHT: 65 IN | TEMPERATURE: 98 F | BODY MASS INDEX: 26.79 KG/M2 | RESPIRATION RATE: 18 BRPM | WEIGHT: 160.8 LBS | SYSTOLIC BLOOD PRESSURE: 140 MMHG | DIASTOLIC BLOOD PRESSURE: 90 MMHG

## 2025-02-25 DIAGNOSIS — N30.00 ACUTE CYSTITIS WITHOUT HEMATURIA: Primary | ICD-10-CM

## 2025-02-25 LAB
SL AMB  POCT GLUCOSE, UA: NEGATIVE
SL AMB LEUKOCYTE ESTERASE,UA: ABNORMAL
SL AMB POCT BILIRUBIN,UA: NEGATIVE
SL AMB POCT BLOOD,UA: ABNORMAL
SL AMB POCT CLARITY,UA: ABNORMAL
SL AMB POCT COLOR,UA: YELLOW
SL AMB POCT KETONES,UA: NEGATIVE
SL AMB POCT NITRITE,UA: NEGATIVE
SL AMB POCT PH,UA: 6
SL AMB POCT SPECIFIC GRAVITY,UA: 1.02
SL AMB POCT URINE PROTEIN: POSITIVE
SL AMB POCT UROBILINOGEN: 0.2

## 2025-02-25 PROCEDURE — 81002 URINALYSIS NONAUTO W/O SCOPE: CPT | Performed by: FAMILY MEDICINE

## 2025-02-25 PROCEDURE — 99213 OFFICE O/P EST LOW 20 MIN: CPT | Performed by: FAMILY MEDICINE

## 2025-02-25 PROCEDURE — G2211 COMPLEX E/M VISIT ADD ON: HCPCS | Performed by: FAMILY MEDICINE

## 2025-02-25 RX ORDER — SULFAMETHOXAZOLE AND TRIMETHOPRIM 800; 160 MG/1; MG/1
1 TABLET ORAL EVERY 12 HOURS SCHEDULED
Qty: 6 TABLET | Refills: 0 | Status: SHIPPED | OUTPATIENT
Start: 2025-02-25 | End: 2025-02-28

## 2025-02-25 NOTE — PATIENT INSTRUCTIONS
Dipstick urine has some pus cells and it consistent with an early infection.  Bactrim DS twice daily for 3 days.  Increase fluid intake.  Follow-up as scheduled.

## 2025-02-25 NOTE — PROGRESS NOTES
Name: Gail Bradshaw      : 1940      MRN: [id number]  Encounter Provider: Isac Saunders MD  Encounter Date: 2025   Encounter department: [facility]    Assessment & Plan  Acute cystitis without hematuria    Orders:  •  sulfamethoxazole-trimethoprim (BACTRIM DS) 800-160 mg per tablet; Take 1 tablet by mouth every 12 (twelve) hours for 3 days       Patient Instructions   Dipstick urine has some pus cells and it consistent with an early infection.  Bactrim DS twice daily for 3 days.  Increase fluid intake.  Follow-up as scheduled.      History of Present Illness     HPI  Here with a concerned about urinary tract infection.  Does not have burning but it feels weird.  Last UTI about 9 months ago.  Sexually active.  Does use Estrace vaginal cream but still uncomfortable.  Sexually active in the last few days.    Review of Systems    Past Medical History:   Diagnosis Date   • Closed compression fracture of fourth lumbar vertebra ([facility]) 2024   • E coli bacteremia 2020   • Osteoporosis 2023   • Paroxysmal atrial fibrillation ([facility]) 2023   • Pyelonephritis 2020   • Renal cell cancer, left ([facility]) 2023   • Seasonal affective disorder ([facility]) 2023   • Sepsis due to Escherichia coli without acute organ dysfunction ([facility]) 2020   • Stage 3a chronic kidney disease ([facility]) 2023     Past Surgical History:   Procedure Laterality Date   • HEMORROIDECTOMY     • REPLACEMENT TOTAL KNEE Right    • TOTAL HIP ARTHROPLASTY Right      Social History     Social History Narrative     since .   is the same age.  2 months older.      4 children. All girls.  8 grandchildren.  19 great-grandchildren.    Was a homemaker.    Enjoys reading.  Visits people, especially those who live alone.     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    Has a home [place] in their barn.   also does Bible study in the barn on  or .      Current Outpatient Medications on File Prior to Visit   Medication Sig   • acetaminophen (TYLENOL) 500 mg tablet Take 500 mg by mouth if needed for mild pain   • amoxicillin (AMOXIL) 500 MG tablet TAKE 4 TABLETS BY MOUTH ONE HOUR PRIOR TO APPOINTMENT   • apixaban (Eliquis) 5 mg Take 5 mg by mouth 2 (two) times a day   • ascorbic acid (VITAMIN C) 250 MG CHEW Chew 500 mg daily   • chlorhexidine (PERIDEX) 0.12 % solution RINSE MOUTH WITH WITH 15 ML FOR 30 SECONDS IN THE MORNING AND IN THE EVENING AFTER TOOTHBRUSHING. EXPECTORATE AFTER RINSING, DO NOT SWALLOW   • Cholecalciferol (Vitamin D) 50 MCG (2000 UT) CAPS Take by mouth   • Cranberry 1000 MG CAPS Take 2,500 mg by mouth daily   • Cranberry 400 MG CAPS Take 400 mg by mouth   • Diclofenac Sodium (VOLTAREN) 1 % Apply 4 g topically 3 (three) times a day as needed (knee pain/ back pain)   • estradiol (ESTRACE) 0.1 mg/g vaginal cream PLACE A DIME-SIZED AMOUNT AROUND THE URETHRA 3 TIMES WEEKLY BEFORE BED   • famotidine (PEPCID) 40 MG tablet TAKE ONE TABLET BY MOUTH AT BEDTIME   • gabapentin (NEURONTIN) 100 mg capsule TAKE 1-2 CAPSULES BY MOUTH AT BEDTIME   • ketoconazole (NIZORAL) 2 % shampoo Apply 1 Application topically 2 (two) times a week   • Menaquinone-7 (K2 PO) Take 50 mg by mouth daily   • omega-3-acid ethyl esters (LOVAZA) 1 g capsule Take 1 g by mouth daily   • omeprazole (PriLOSEC) 40 MG capsule Take 1 capsule (40 mg total) by mouth daily   • sertraline (ZOLOFT) 50 mg tablet Take 1 tablet (50 mg total) by mouth daily   • Zinc Gluconate 50 MG CAPS Take 50 mg by mouth   • albuterol (Ventolin HFA) 90 mcg/act inhaler Inhale 2 puffs every 6 (six) hours as needed for wheezing (Coughing fits) Use PRN prior to coughing fits (Patient not taking: Reported on 6/28/2024)   • [DISCONTINUED] Omega 3 1000 MG CAPS Take 1 g by mouth (Patient not taking: Reported on 11/25/2024)     Allergies   Allergen Reactions   • Benzocaine Other (See Comments) and Vomiting      "anesthesia-not sure of specific drug   • Ciprofloxacin Abdominal Pain     Able to take   • Risedronate Other (See Comments)     Other reaction(s): stomach pains   Other reaction(s): Nausea and Vomiting, Stomach upset   • Nsaids GI Intolerance     Immunization History   Administered Date(s) Administered   • COVID-19 J&J (innocutis) vaccine 0.5 mL 04/05/2021, 11/21/2021   • COVID-19 Pfizer vac (Qamar-sucrose, gray cap) 12 yr+ IM 05/23/2022   • INFLUENZA 11/07/2003, 10/02/2012, 12/19/2022, 11/09/2023   • Influenza Split High Dose Preservative Free IM 11/04/2020   • Influenza, Seasonal Vaccine, Quadrivalent, Adjuvanted, .5e 12/19/2022   • Pneumococcal Conjugate 13-Valent 12/08/2019   • Pneumococcal Polysaccharide PPV23 02/02/2005   • Zoster Vaccine Recombinant 09/26/2021, 01/20/2022   • influenza, trivalent, adjuvanted 09/08/2024     Objective   /90   Pulse (!) 113   Temp 98 °F (36.7 °C) (Temporal)   Resp 18   Ht 5' 5\" (1.651 m)   Wt 72.9 kg (160 lb 12.8 oz)   LMP  (LMP Unknown)   SpO2 98%   BMI 26.76 kg/m²     Physical Exam  Appears well.  Spunky.  Abdomen is soft.  No suprapubic tenderness.  Urine 1+ leukocytes, 1+ blood.      "

## 2025-03-04 ENCOUNTER — TELEPHONE (OUTPATIENT)
Age: 85
End: 2025-03-04

## 2025-03-04 DIAGNOSIS — N30.00 ACUTE CYSTITIS WITHOUT HEMATURIA: Primary | ICD-10-CM

## 2025-03-04 RX ORDER — NITROFURANTOIN 25; 75 MG/1; MG/1
100 CAPSULE ORAL 2 TIMES DAILY
Qty: 10 CAPSULE | Refills: 0 | Status: SHIPPED | OUTPATIENT
Start: 2025-03-04 | End: 2025-03-09

## 2025-03-04 NOTE — TELEPHONE ENCOUNTER
Pt called in requesting another urinalysis, stated shes still having symptoms and medication is not working. Please follow up

## 2025-03-08 ENCOUNTER — OFFICE VISIT (OUTPATIENT)
Dept: URGENT CARE | Facility: CLINIC | Age: 85
End: 2025-03-08
Payer: MEDICARE

## 2025-03-08 VITALS
SYSTOLIC BLOOD PRESSURE: 123 MMHG | TEMPERATURE: 96.7 F | OXYGEN SATURATION: 98 % | HEART RATE: 104 BPM | RESPIRATION RATE: 18 BRPM | DIASTOLIC BLOOD PRESSURE: 78 MMHG

## 2025-03-08 DIAGNOSIS — N39.0 URINARY TRACT INFECTION WITHOUT HEMATURIA, SITE UNSPECIFIED: Primary | ICD-10-CM

## 2025-03-08 LAB
SL AMB  POCT GLUCOSE, UA: ABNORMAL
SL AMB LEUKOCYTE ESTERASE,UA: ABNORMAL
SL AMB POCT BILIRUBIN,UA: ABNORMAL
SL AMB POCT BLOOD,UA: ABNORMAL
SL AMB POCT CLARITY,UA: ABNORMAL
SL AMB POCT COLOR,UA: YELLOW
SL AMB POCT KETONES,UA: ABNORMAL
SL AMB POCT NITRITE,UA: ABNORMAL
SL AMB POCT PH,UA: 6
SL AMB POCT SPECIFIC GRAVITY,UA: 1.02
SL AMB POCT URINE PROTEIN: ABNORMAL
SL AMB POCT UROBILINOGEN: 0.2

## 2025-03-08 PROCEDURE — 81002 URINALYSIS NONAUTO W/O SCOPE: CPT | Performed by: PHYSICIAN ASSISTANT

## 2025-03-08 PROCEDURE — 99213 OFFICE O/P EST LOW 20 MIN: CPT | Performed by: PHYSICIAN ASSISTANT

## 2025-03-08 PROCEDURE — 87086 URINE CULTURE/COLONY COUNT: CPT | Performed by: FAMILY MEDICINE

## 2025-03-08 PROCEDURE — G0463 HOSPITAL OUTPT CLINIC VISIT: HCPCS | Performed by: PHYSICIAN ASSISTANT

## 2025-03-08 RX ORDER — CEPHALEXIN 500 MG/1
500 CAPSULE ORAL EVERY 8 HOURS SCHEDULED
Qty: 30 CAPSULE | Refills: 0 | Status: SHIPPED | OUTPATIENT
Start: 2025-03-08 | End: 2025-03-18

## 2025-03-08 NOTE — PROGRESS NOTES
[facility]        NAME: Gail Bradshaw is a 85 y.o. female  : 1940    MRN: [id number]  DATE: 2025  TIME: 8:26 AM    /78   Pulse 104   Temp (!) 96.7 °F (35.9 °C)   Resp 18   LMP  (LMP Unknown)   SpO2 98%     Assessment and Plan   Urinary tract infection without hematuria, site unspecified [N39.0]  1. Urinary tract infection without hematuria, site unspecified  POCT urine dip    Urine culture    cephalexin (KEFLEX) 500 mg capsule            Patient Instructions       Follow up with PCP in 3-5 days.  Proceed to  ER if symptoms worsen.    Chief Complaint     Chief Complaint   Patient presents with    Possible UTI     Patient states starting last week, she started experiencing some burning with urination.         History of Present Illness       Pt with 1 week faint burning with urine and pelvis cramping, no cramping now in office         Review of Systems   Review of Systems   Constitutional: Negative.    HENT: Negative.     Eyes: Negative.    Respiratory: Negative.     Cardiovascular: Negative.    Gastrointestinal: Negative.    Endocrine: Negative.    Genitourinary:  Positive for dysuria.   Musculoskeletal: Negative.    Skin: Negative.    Allergic/Immunologic: Negative.    Neurological: Negative.    Hematological: Negative.    Psychiatric/Behavioral: Negative.     All other systems reviewed and are negative.        Current Medications       Current Outpatient Medications:     cephalexin (KEFLEX) 500 mg capsule, Take 1 capsule (500 mg total) by mouth every 8 (eight) hours for 10 days, Disp: 30 capsule, Rfl: 0    acetaminophen (TYLENOL) 500 mg tablet, Take 500 mg by mouth if needed for mild pain, Disp: , Rfl:     albuterol (Ventolin HFA) 90 mcg/act inhaler, Inhale 2 puffs every 6 (six) hours as needed for wheezing (Coughing fits) Use PRN prior to coughing fits (Patient not taking: Reported on 2024), Disp: 6.7 g, Rfl: 1    amoxicillin (AMOXIL) 500 MG tablet, TAKE 4 TABLETS BY  MOUTH ONE HOUR PRIOR TO APPOINTMENT, Disp: , Rfl:     apixaban (Eliquis) 5 mg, Take 5 mg by mouth 2 (two) times a day, Disp: , Rfl:     ascorbic acid (VITAMIN C) 250 MG CHEW, Chew 500 mg daily, Disp: , Rfl:     chlorhexidine (PERIDEX) 0.12 % solution, RINSE MOUTH WITH WITH 15 ML FOR 30 SECONDS IN THE MORNING AND IN THE EVENING AFTER TOOTHBRUSHING. EXPECTORATE AFTER RINSING, DO NOT SWALLOW, Disp: , Rfl:     Cholecalciferol (Vitamin D) 50 MCG (2000 UT) CAPS, Take by mouth, Disp: , Rfl:     Cranberry 1000 MG CAPS, Take 2,500 mg by mouth daily, Disp: , Rfl:     Cranberry 400 MG CAPS, Take 400 mg by mouth, Disp: , Rfl:     Diclofenac Sodium (VOLTAREN) 1 %, Apply 4 g topically 3 (three) times a day as needed (knee pain/ back pain), Disp: 350 g, Rfl: 2    estradiol (ESTRACE) 0.1 mg/g vaginal cream, PLACE A DIME-SIZED AMOUNT AROUND THE URETHRA 3 TIMES WEEKLY BEFORE BED, Disp: , Rfl:     famotidine (PEPCID) 40 MG tablet, TAKE ONE TABLET BY MOUTH AT BEDTIME, Disp: 90 tablet, Rfl: 1    gabapentin (NEURONTIN) 100 mg capsule, TAKE 1-2 CAPSULES BY MOUTH AT BEDTIME, Disp: 60 capsule, Rfl: 5    ketoconazole (NIZORAL) 2 % shampoo, Apply 1 Application topically 2 (two) times a week, Disp: 120 mL, Rfl: 1    Menaquinone-7 (K2 PO), Take 50 mg by mouth daily, Disp: , Rfl:     nitrofurantoin (MACROBID) 100 mg capsule, Take 1 capsule (100 mg total) by mouth 2 (two) times a day for 5 days, Disp: 10 capsule, Rfl: 0    omega-3-acid ethyl esters (LOVAZA) 1 g capsule, Take 1 g by mouth daily, Disp: , Rfl:     omeprazole (PriLOSEC) 40 MG capsule, Take 1 capsule (40 mg total) by mouth daily, Disp: 90 capsule, Rfl: 3    sertraline (ZOLOFT) 50 mg tablet, Take 1 tablet (50 mg total) by mouth daily, Disp: 90 tablet, Rfl: 3    Zinc Gluconate 50 MG CAPS, Take 50 mg by mouth, Disp: , Rfl:     Current Allergies     Allergies as of 03/08/2025 - Reviewed 03/08/2025   Allergen Reaction Noted    Benzocaine Other (See Comments), Vomiting, and GI Intolerance  01/25/2024    Ciprofloxacin Abdominal Pain 06/28/2024    Risedronate Other (See Comments) and GI Intolerance 06/29/2015    Nsaids GI Intolerance             The following portions of the patient's history were reviewed and updated as appropriate: allergies, current medications, past family history, past medical history, past social history, past surgical history and problem list.     Past Medical History:   Diagnosis Date    Closed compression fracture of fourth lumbar vertebra ([facility]) 11/28/2024    E coli bacteremia 06/30/2020    Osteoporosis 12/11/2023    Paroxysmal atrial fibrillation (HCC) 12/11/2023    Pyelonephritis 06/27/2020    Renal cell cancer, left (HCC) 12/11/2023    Seasonal affective disorder (HCC) 12/11/2023    Sepsis due to Escherichia coli without acute organ dysfunction ([facility]) 06/27/2020    Stage 3a chronic kidney disease ([facility]) 12/11/2023       Past Surgical History:   Procedure Laterality Date    HEMORROIDECTOMY      REPLACEMENT TOTAL KNEE Right 2004    TOTAL HIP ARTHROPLASTY Right 2006       Family History   Problem Relation Age of Onset    Pancreatic cancer Brother          Medications have been verified.        Objective   /78   Pulse 104   Temp (!) 96.7 °F (35.9 °C)   Resp 18   LMP  (LMP Unknown)   SpO2 98%        Physical Exam     Physical Exam  Vitals and nursing note reviewed.   Constitutional:       Appearance: Normal appearance. She is normal weight.   HENT:      Head: Normocephalic and atraumatic.   Cardiovascular:      Rate and Rhythm: Normal rate and regular rhythm.      Pulses: Normal pulses.      Heart sounds: Normal heart sounds.   Pulmonary:      Effort: Pulmonary effort is normal.      Breath sounds: Normal breath sounds.   Abdominal:      General: Bowel sounds are normal.      Palpations: Abdomen is soft.   Musculoskeletal:      Cervical back: Normal range of motion and neck supple.   Skin:     General: Skin is warm.   Neurological:      Mental Status: She is alert and  oriented to person, place, and time.

## 2025-03-10 ENCOUNTER — RESULTS FOLLOW-UP (OUTPATIENT)
Dept: FAMILY MEDICINE CLINIC | Facility: CLINIC | Age: 85
End: 2025-03-10

## 2025-03-10 LAB — BACTERIA UR CULT: NORMAL

## 2025-03-10 NOTE — RESULT ENCOUNTER NOTE
Repeat urine culture grew mixed contaminants, not felt to be a true infection.  At this point, you have completed the second course of antibiotics and hopefully not having symptoms.

## 2025-04-26 ENCOUNTER — HOSPITAL ENCOUNTER (EMERGENCY)
Facility: HOSPITAL | Age: 85
Discharge: HOME | End: 2025-04-26
Attending: EMERGENCY MEDICINE | Admitting: EMERGENCY MEDICINE
Payer: MEDICARE

## 2025-04-26 VITALS
DIASTOLIC BLOOD PRESSURE: 88 MMHG | RESPIRATION RATE: 22 BRPM | OXYGEN SATURATION: 100 % | TEMPERATURE: 97.5 F | HEART RATE: 109 BPM | SYSTOLIC BLOOD PRESSURE: 142 MMHG

## 2025-04-26 DIAGNOSIS — G43.909 MIGRAINE: Primary | ICD-10-CM

## 2025-04-26 LAB
ALBUMIN SERPL BCG-MCNC: 4.3 G/DL (ref 3.5–5)
ALP SERPL-CCNC: 61 U/L (ref 34–104)
ALT SERPL W P-5'-P-CCNC: 48 U/L (ref 7–52)
ANION GAP SERPL CALCULATED.3IONS-SCNC: 12 MMOL/L (ref 4–13)
AST SERPL W P-5'-P-CCNC: 48 U/L (ref 13–39)
BASOPHILS # BLD AUTO: 0.02 THOUSANDS/ÂΜL (ref 0–0.1)
BASOPHILS NFR BLD AUTO: 0 % (ref 0–1)
BILIRUB SERPL-MCNC: 0.96 MG/DL (ref 0.2–1)
BUN SERPL-MCNC: 20 MG/DL (ref 5–25)
CALCIUM SERPL-MCNC: 9.8 MG/DL (ref 8.4–10.2)
CHLORIDE SERPL-SCNC: 107 MMOL/L (ref 96–108)
CO2 SERPL-SCNC: 21 MMOL/L (ref 21–32)
CREAT SERPL-MCNC: 0.96 MG/DL (ref 0.6–1.3)
EOSINOPHIL # BLD AUTO: 0.08 THOUSAND/ÂΜL (ref 0–0.61)
EOSINOPHIL NFR BLD AUTO: 2 % (ref 0–6)
ERYTHROCYTE [DISTWIDTH] IN BLOOD BY AUTOMATED COUNT: 14 % (ref 11.6–15.1)
GFR SERPL CREATININE-BSD FRML MDRD: 54 ML/MIN/1.73SQ M
GLUCOSE SERPL-MCNC: 98 MG/DL (ref 65–140)
HCT VFR BLD AUTO: 46.1 % (ref 34.8–46.1)
HGB BLD-MCNC: 15 G/DL (ref 11.5–15.4)
IMM GRANULOCYTES # BLD AUTO: 0.02 THOUSAND/UL (ref 0–0.2)
IMM GRANULOCYTES NFR BLD AUTO: 0 % (ref 0–2)
LIPASE SERPL-CCNC: 20 U/L (ref 11–82)
LYMPHOCYTES # BLD AUTO: 1.27 THOUSANDS/ÂΜL (ref 0.6–4.47)
LYMPHOCYTES NFR BLD AUTO: 25 % (ref 14–44)
MCH RBC QN AUTO: 30.9 PG (ref 26.8–34.3)
MCHC RBC AUTO-ENTMCNC: 32.5 G/DL (ref 31.4–37.4)
MCV RBC AUTO: 95 FL (ref 82–98)
MONOCYTES # BLD AUTO: 0.53 THOUSAND/ÂΜL (ref 0.17–1.22)
MONOCYTES NFR BLD AUTO: 10 % (ref 4–12)
NEUTROPHILS # BLD AUTO: 3.19 THOUSANDS/ÂΜL (ref 1.85–7.62)
NEUTS SEG NFR BLD AUTO: 63 % (ref 43–75)
NRBC BLD AUTO-RTO: 0 /100 WBCS
PLATELET # BLD AUTO: 184 THOUSANDS/UL (ref 149–390)
PMV BLD AUTO: 10.6 FL (ref 8.9–12.7)
POTASSIUM SERPL-SCNC: 4.2 MMOL/L (ref 3.5–5.3)
PROT SERPL-MCNC: 6.6 G/DL (ref 6.4–8.4)
RBC # BLD AUTO: 4.86 MILLION/UL (ref 3.81–5.12)
SODIUM SERPL-SCNC: 140 MMOL/L (ref 135–147)
WBC # BLD AUTO: 5.11 THOUSAND/UL (ref 4.31–10.16)

## 2025-04-26 PROCEDURE — 80053 COMPREHEN METABOLIC PANEL: CPT

## 2025-04-26 PROCEDURE — 99284 EMERGENCY DEPT VISIT MOD MDM: CPT | Performed by: EMERGENCY MEDICINE

## 2025-04-26 PROCEDURE — 85025 COMPLETE CBC W/AUTO DIFF WBC: CPT

## 2025-04-26 PROCEDURE — 96365 THER/PROPH/DIAG IV INF INIT: CPT

## 2025-04-26 PROCEDURE — 99284 EMERGENCY DEPT VISIT MOD MDM: CPT

## 2025-04-26 PROCEDURE — 36415 COLL VENOUS BLD VENIPUNCTURE: CPT

## 2025-04-26 PROCEDURE — 83690 ASSAY OF LIPASE: CPT

## 2025-04-26 PROCEDURE — 96375 TX/PRO/DX INJ NEW DRUG ADDON: CPT

## 2025-04-26 RX ORDER — ACETAMINOPHEN 10 MG/ML
1000 INJECTION, SOLUTION INTRAVENOUS ONCE
Status: COMPLETED | OUTPATIENT
Start: 2025-04-26 | End: 2025-04-26

## 2025-04-26 RX ORDER — MAGNESIUM SULFATE HEPTAHYDRATE 40 MG/ML
2 INJECTION, SOLUTION INTRAVENOUS ONCE
Status: COMPLETED | OUTPATIENT
Start: 2025-04-26 | End: 2025-04-26

## 2025-04-26 RX ORDER — METOCLOPRAMIDE HYDROCHLORIDE 5 MG/ML
10 INJECTION INTRAMUSCULAR; INTRAVENOUS ONCE
Status: COMPLETED | OUTPATIENT
Start: 2025-04-26 | End: 2025-04-26

## 2025-04-26 RX ORDER — DIPHENHYDRAMINE HYDROCHLORIDE 50 MG/ML
25 INJECTION, SOLUTION INTRAMUSCULAR; INTRAVENOUS ONCE
Status: COMPLETED | OUTPATIENT
Start: 2025-04-26 | End: 2025-04-26

## 2025-04-26 RX ADMIN — METOCLOPRAMIDE 10 MG: 5 INJECTION, SOLUTION INTRAMUSCULAR; INTRAVENOUS at 19:47

## 2025-04-26 RX ADMIN — ACETAMINOPHEN 1000 MG: 10 INJECTION INTRAVENOUS at 19:39

## 2025-04-26 RX ADMIN — DIPHENHYDRAMINE HYDROCHLORIDE 25 MG: 50 INJECTION, SOLUTION INTRAMUSCULAR; INTRAVENOUS at 19:57

## 2025-04-26 RX ADMIN — SODIUM CHLORIDE 1000 ML: 0.9 INJECTION, SOLUTION INTRAVENOUS at 19:49

## 2025-04-26 RX ADMIN — MAGNESIUM SULFATE HEPTAHYDRATE 2 G: 40 INJECTION, SOLUTION INTRAVENOUS at 19:57

## 2025-04-27 NOTE — DISCHARGE INSTRUCTIONS
Dear Gail Bradshaw,     It was our pleasure to care for you here at [facility]. It was an honor and privilege to be part of your health care team. Please review this entire after visit summary and read your personalized discharge instructions below:  Please follow up with your PCP within 1 week or as soon as possible.  Please take tylenol as needed for headache.  Please return to the ER if your symptoms worsen or fail to improve, if you experience numbness tingling or unilateral weakness, or if you experience anything concerning to you.    Sincerely,     Garry Francis MD

## 2025-04-27 NOTE — ED ATTENDING ATTESTATION
"4/26/2025  I, Osbaldo Villarreal MD, saw and evaluated the patient. I have discussed the patient with the resident/non-physician practitioner and agree with the resident's/non-physician practitioner's findings, Plan of Care, and MDM as documented in the resident's/non-physician practitioner's note, except where noted. All available labs and Radiology studies were reviewed.  I was present for key portions of any procedure(s) performed by the resident/non-physician practitioner and I was immediately available to provide assistance.       At this point I agree with the current assessment done in the Emergency Department.  I have conducted an independent evaluation of this patient a history and physical is as follows:    S:  Chief Complaint   Patient presents with    Headache     Patient c/o headache that began approx 4 days ago. Patient took 2 \"migraine tablets today\".     Gail is an 85 y.o. female who presents with the chief complaint of a generalized headache with nausea.  She reports she often gets headaches like this but is able to take an excedrin, vomit, take a nap and then feel better.  She has had this headache off and on for the past 4 days.  No fevers or chills .     O:  ED Triage Vitals   Temperature Pulse Respirations Blood Pressure SpO2   04/26/25 1901 04/26/25 1901 04/26/25 1901 04/26/25 1901 04/26/25 1901   97.5 °F (36.4 °C) 103 16 (!) 172/120 97 %      Temp Source Heart Rate Source Patient Position - Orthostatic VS BP Location FiO2 (%)   04/26/25 1901 04/26/25 1901 04/26/25 2015 04/26/25 2015 --   Oral Monitor Sitting Right arm       Pain Score       04/26/25 1945       10 - Worst Possible Pain         Physical Exam  Vitals and nursing note reviewed.   Constitutional:       General: She is in acute distress.      Appearance: She is well-developed.   HENT:      Head: Normocephalic and atraumatic.   Eyes:      Pupils: Pupils are equal, round, and reactive to light.   Neck:      Vascular: No JVD. "   Cardiovascular:      Rate and Rhythm: Regular rhythm. Tachycardia present.      Heart sounds: Normal heart sounds. No murmur heard.     No friction rub. No gallop.   Pulmonary:      Effort: Pulmonary effort is normal. No respiratory distress.      Breath sounds: Normal breath sounds. No wheezing or rales.   Chest:      Chest wall: No tenderness.   Musculoskeletal:         General: No tenderness. Normal range of motion.      Cervical back: Normal range of motion.   Skin:     General: Skin is warm and dry.   Neurological:      General: No focal deficit present.      Mental Status: She is alert and oriented to person, place, and time.   Psychiatric:         Behavior: Behavior normal.         Thought Content: Thought content normal.         Judgment: Judgment normal.       A/P:  85 y.o. female presents with chief complaint of a generalized headache.  This headache is similar to prior headaches and was gradual in onset but has been persistent.  No fevers, chills, meningismus.  No confusion or focal neurological deficits.  Will treat with migraine cocktail - adjusted for patient's allergies and prior effective treatments.   Will check cbc to rule out anemia as cause, will check metabolic panel to rule out significant metabolic derangement or dehydration.  We may consider a non-con CT of the head.  No recent trauma.  Not on blood thinners.     ED Course     Medications   metoclopramide (REGLAN) injection 10 mg (10 mg Intravenous Given 4/26/25 1947)   diphenhydrAMINE (BENADRYL) injection 25 mg (25 mg Intravenous Given 4/26/25 1957)   magnesium sulfate 2 g/50 mL IVPB (premix) 2 g (0 g Intravenous Stopped 4/26/25 2057)   sodium chloride 0.9 % bolus 1,000 mL (0 mL Intravenous Stopped 4/26/25 2050)   acetaminophen (Ofirmev) injection 1,000 mg (0 mg Intravenous Stopped 4/26/25 1954)     Labs Reviewed   COMPREHENSIVE METABOLIC PANEL - Abnormal       Result Value Ref Range Status    Sodium 140  135 - 147 mmol/L Final     Potassium 4.2  3.5 - 5.3 mmol/L Final    Chloride 107  96 - 108 mmol/L Final    CO2 21  21 - 32 mmol/L Final    ANION GAP 12  4 - 13 mmol/L Final    BUN 20  5 - 25 mg/dL Final    Creatinine 0.96  0.60 - 1.30 mg/dL Final    Comment: Standardized to IDMS reference method    Glucose 98  65 - 140 mg/dL Final    Comment: If the patient is fasting, the ADA then defines impaired fasting glucose as > 100 mg/dL and diabetes as > or equal to 123 mg/dL.    Calcium 9.8  8.4 - 10.2 mg/dL Final    AST 48 (*) 13 - 39 U/L Final    ALT 48  7 - 52 U/L Final    Comment: Specimen collection should occur prior to Sulfasalazine administration due to the potential for falsely depressed results.     Alkaline Phosphatase 61  34 - 104 U/L Final    Total Protein 6.6  6.4 - 8.4 g/dL Final    Albumin 4.3  3.5 - 5.0 g/dL Final    Total Bilirubin 0.96  0.20 - 1.00 mg/dL Final    Comment: Use of this assay is not recommended for patients undergoing treatment with eltrombopag due to the potential for falsely elevated results.  N-acetyl-p-benzoquinone imine (metabolite of Acetaminophen) will generate erroneously low results in samples for patients that have taken an overdose of Acetaminophen.    eGFR 54  ml/min/1.73sq m Final    Narrative:     National Kidney Disease Foundation guidelines for Chronic Kidney Disease (CKD):     Stage 1 with normal or high GFR (GFR > 90 mL/min/1.73 square meters)    Stage 2 Mild CKD (GFR = 60-89 mL/min/1.73 square meters)    Stage 3A Moderate CKD (GFR = 45-59 mL/min/1.73 square meters)    Stage 3B Moderate CKD (GFR = 30-44 mL/min/1.73 square meters)    Stage 4 Severe CKD (GFR = 15-29 mL/min/1.73 square meters)    Stage 5 End Stage CKD (GFR <15 mL/min/1.73 square meters)  Note: GFR calculation is accurate only with a steady state creatinine   LIPASE - Normal    Lipase 20  11 - 82 u/L Final   CBC AND DIFFERENTIAL    WBC 5.11  4.31 - 10.16 Thousand/uL Final    RBC 4.86  3.81 - 5.12 Million/uL Final    Hemoglobin 15.0   11.5 - 15.4 g/dL Final    Hematocrit 46.1  34.8 - 46.1 % Final    MCV 95  82 - 98 fL Final    MCH 30.9  26.8 - 34.3 pg Final    MCHC 32.5  31.4 - 37.4 g/dL Final    RDW 14.0  11.6 - 15.1 % Final    MPV 10.6  8.9 - 12.7 fL Final    Platelets 184  149 - 390 Thousands/uL Final    nRBC 0  /100 WBCs Final    Segmented % 63  43 - 75 % Final    Immature Grans % 0  0 - 2 % Final    Lymphocytes % 25  14 - 44 % Final    Monocytes % 10  4 - 12 % Final    Eosinophils Relative 2  0 - 6 % Final    Basophils Relative 0  0 - 1 % Final    Absolute Neutrophils 3.19  1.85 - 7.62 Thousands/µL Final    Absolute Immature Grans 0.02  0.00 - 0.20 Thousand/uL Final    Absolute Lymphocytes 1.27  0.60 - 4.47 Thousands/µL Final    Absolute Monocytes 0.53  0.17 - 1.22 Thousand/µL Final    Eosinophils Absolute 0.08  0.00 - 0.61 Thousand/µL Final    Basophils Absolute 0.02  0.00 - 0.10 Thousands/µL Final         Critical Care Time  Procedures    Time reflects when diagnosis was documented in both MDM as applicable and the Disposition within this note       Time User Action Codes Description Comment    4/26/2025  9:07 PM Garry Francis Add [G43.909] Migraine           ED Disposition       ED Disposition   Discharge    Condition   Stable    Date/Time   Sat Apr 26, 2025  9:07 PM    Comment   Gail Bradshaw discharge to home/self care.                   Follow-up Information       Follow up With Specialties Details Why Contact Info    Vicky Wilson MD Family Medicine In 1 week  [address] [place] [id number] [phone]

## 2025-04-27 NOTE — ED PROVIDER NOTES
Time reflects when diagnosis was documented in both MDM as applicable and the Disposition within this note       Time User Action Codes Description Comment    4/26/2025  9:07 PM Garry Francis Add [G43.909] Migraine           ED Disposition       ED Disposition   Discharge    Condition   Stable    Date/Time   Sat Apr 26, 2025  9:07 PM    Comment   Gail Bradshaw discharge to home/self care.                   Assessment & Plan       Medical Decision Making  Patient seen and examined. Physical exam is notable for normal neurologic exam. Remainder of exam is within normal limits.    Differential: Migraine headache, less likely intracranial abnormality given benign neuroexam    Appropriate labs and imaging ordered. Pain control with migraine cocktail.    Labs unremarkable. This supports a diagnosis of migraine headache. All results discussed with patient and family, they express understanding.     Patient is agreeable to discharge home with follow-up with primary care doctor. All questions answered and return precautions discussed.       Amount and/or Complexity of Data Reviewed  Labs: ordered.    Risk  Prescription drug management.             Medications   metoclopramide (REGLAN) injection 10 mg (10 mg Intravenous Given 4/26/25 1947)   diphenhydrAMINE (BENADRYL) injection 25 mg (25 mg Intravenous Given 4/26/25 1957)   magnesium sulfate 2 g/50 mL IVPB (premix) 2 g (0 g Intravenous Stopped 4/26/25 2057)   sodium chloride 0.9 % bolus 1,000 mL (0 mL Intravenous Stopped 4/26/25 2050)   acetaminophen (Ofirmev) injection 1,000 mg (0 mg Intravenous Stopped 4/26/25 1954)       ED Risk Strat Scores                    No data recorded        SBIRT 20yo+      Flowsheet Row Most Recent Value   Initial Alcohol Screen: US AUDIT-C     1. How often do you have a drink containing alcohol? 0 Filed at: 04/26/2025 1901   2. How many drinks containing alcohol do you have on a typical day you are drinking?  0 Filed at: 04/26/2025 1901   3b.  "FEMALE Any Age, or MALE 65+: How often do you have 4 or more drinks on one occassion? 0 Filed at: 04/26/2025 1901   Audit-C Score 0 Filed at: 04/26/2025 1901   EMILY: How many times in the past year have you...    Used an illegal drug or used a prescription medication for non-medical reasons? Never Filed at: 04/26/2025 1901                            History of Present Illness       Chief Complaint   Patient presents with    Headache     Patient c/o headache that began approx 4 days ago. Patient took 2 \"migraine tablets today\".       Past Medical History:   Diagnosis Date    Closed compression fracture of fourth lumbar vertebra ([facility]) 11/28/2024    E coli bacteremia 06/30/2020    Osteoporosis 12/11/2023    Paroxysmal atrial fibrillation ([facility]) 12/11/2023    Pyelonephritis 06/27/2020    Renal cell cancer, left ([facility]) 12/11/2023    Seasonal affective disorder ([facility]) 12/11/2023    Sepsis due to Escherichia coli without acute organ dysfunction ([facility]) 06/27/2020    Stage 3a chronic kidney disease ([facility]) 12/11/2023      Past Surgical History:   Procedure Laterality Date    HEMORROIDECTOMY      REPLACEMENT TOTAL KNEE Right 2004    TOTAL HIP ARTHROPLASTY Right 2006      Family History   Problem Relation Age of Onset    Pancreatic cancer Brother       Social History     Tobacco Use    Smoking status: Never    Smokeless tobacco: Never   Vaping Use    Vaping status: Never Used   Substance Use Topics    Alcohol use: Not Currently    Drug use: No      E-Cigarette/Vaping    E-Cigarette Use Never User       E-Cigarette/Vaping Substances    Nicotine No     THC No     CBD No     Flavoring No     Other No     Unknown No       I have reviewed and agree with the history as documented.     Gail Bradshaw is a 85 y.o. female     They presented to the emergency department on April 26, 2025. Patient presents with:  Headache: Patient c/o headache that began approx 4 days ago. Patient took 2 \"migraine tablets today\".  .    The patient states that " she has had a headache for 4 days along with nausea.  She has a history of migraines and this is exactly like her normal migraine however it is lasting longer.  Normally she takes Excedrin Migraine, vomits 1 time, and goes to sleep and when she wakes up the next day her headache is gone.  She tried however it did not work.  She did vomit 1 time few days ago, nonbloody nonbilious.  She has had nausea since that time but has not vomited. Patient denies visual changes, lightheadedness, dizziness, chest pain, shortness of breath, neck pain, palpitations, abdominal pain, hematuria, dysuria, diarrhea, or any other complaint at this time.            History provided by:  Patient  Headache  Associated symptoms: nausea and vomiting    Associated symptoms: no abdominal pain, no back pain, no congestion, no cough, no diarrhea, no dizziness, no drainage, no ear pain, no eye pain, no fever, no numbness, no seizures, no sore throat and no weakness        Review of Systems   Constitutional:  Negative for chills, diaphoresis and fever.   HENT:  Negative for congestion, ear pain, postnasal drip, rhinorrhea and sore throat.    Eyes:  Negative for pain and visual disturbance.   Respiratory:  Negative for cough, chest tightness and shortness of breath.    Cardiovascular:  Negative for chest pain and palpitations.   Gastrointestinal:  Positive for nausea and vomiting. Negative for abdominal pain, constipation and diarrhea.   Genitourinary:  Negative for dysuria and hematuria.   Musculoskeletal:  Negative for arthralgias and back pain.   Skin:  Negative for color change and rash.   Neurological:  Positive for headaches. Negative for dizziness, seizures, syncope, weakness, light-headedness and numbness.   All other systems reviewed and are negative.          Objective       ED Triage Vitals   Temperature Pulse Blood Pressure Respirations SpO2 Patient Position - Orthostatic VS   04/26/25 1901 04/26/25 1901 04/26/25 1901 04/26/25 1901  04/26/25 1901 04/26/25 2015   97.5 °F (36.4 °C) 103 (!) 172/120 16 97 % Sitting      Temp Source Heart Rate Source BP Location FiO2 (%) Pain Score    04/26/25 1901 04/26/25 1901 04/26/25 2015 -- 04/26/25 1945    Oral Monitor Right arm  10 - Worst Possible Pain      Vitals      Date and Time Temp Pulse SpO2 Resp BP Pain Score FACES Pain Rating User   04/26/25 2015 -- 109 100 % 22 142/88 -- -- DP   04/26/25 1945 -- -- -- -- -- 10 - Worst Possible Pain -- DP   04/26/25 1901 97.5 °F (36.4 °C) 103 97 % 16 172/120 -- -- SG            Physical Exam  Constitutional:       General: She is not in acute distress.     Appearance: She is not diaphoretic.   HENT:      Head: Normocephalic and atraumatic.      Nose: No congestion or rhinorrhea.      Mouth/Throat:      Mouth: Mucous membranes are moist.      Pharynx: No oropharyngeal exudate.   Eyes:      General: No visual field deficit or scleral icterus.  Cardiovascular:      Rate and Rhythm: Normal rate and regular rhythm.      Heart sounds: Normal heart sounds. No murmur heard.     No friction rub. No gallop.   Pulmonary:      Effort: No respiratory distress.      Breath sounds: Normal breath sounds. No wheezing, rhonchi or rales.   Abdominal:      General: Abdomen is flat. There is no distension.      Palpations: Abdomen is soft.      Tenderness: There is no abdominal tenderness. There is no guarding.   Lymphadenopathy:      Cervical: No cervical adenopathy.   Skin:     General: Skin is warm and dry.      Capillary Refill: Capillary refill takes less than 2 seconds.      Findings: No rash.   Neurological:      General: No focal deficit present.      Mental Status: She is alert and oriented to person, place, and time.      GCS: GCS eye subscore is 4. GCS verbal subscore is 5. GCS motor subscore is 6.      Cranial Nerves: Cranial nerves 2-12 are intact. No dysarthria or facial asymmetry.      Sensory: Sensation is intact.      Motor: Motor function is intact. No weakness or  tremor.      Coordination: Coordination is intact. Finger-Nose-Finger Test and Heel to Shin Test normal.         Results Reviewed       Procedure Component Value Units Date/Time    Comprehensive metabolic panel [[id number]]  (Abnormal) Collected: 04/1941    Lab Status: Final result Specimen: Blood from Arm, Right Updated: 04/26/25 2002     Sodium 140 mmol/L      Potassium 4.2 mmol/L      Chloride 107 mmol/L      CO2 21 mmol/L      ANION GAP 12 mmol/L      BUN 20 mg/dL      Creatinine 0.96 mg/dL      Glucose 98 mg/dL      Calcium 9.8 mg/dL      AST 48 U/L      ALT 48 U/L      Alkaline Phosphatase 61 U/L      Total Protein 6.6 g/dL      Albumin 4.3 g/dL      Total Bilirubin 0.96 mg/dL      eGFR 54 ml/min/1.73sq m     Narrative:      National Kidney Disease Foundation guidelines for Chronic Kidney Disease (CKD):     Stage 1 with normal or high GFR (GFR > 90 mL/min/1.73 square meters)    Stage 2 Mild CKD (GFR = 60-89 mL/min/1.73 square meters)    Stage 3A Moderate CKD (GFR = 45-59 mL/min/1.73 square meters)    Stage 3B Moderate CKD (GFR = 30-44 mL/min/1.73 square meters)    Stage 4 Severe CKD (GFR = 15-29 mL/min/1.73 square meters)    Stage 5 End Stage CKD (GFR <15 mL/min/1.73 square meters)  Note: GFR calculation is accurate only with a steady state creatinine    Lipase [[id number]]  (Normal) Collected: 04/1941    Lab Status: Final result Specimen: Blood from Arm, Right Updated: 04/26/25 2002     Lipase 20 u/L     CBC and differential [[id number]] Collected: 04/1941    Lab Status: Final result Specimen: Blood from Arm, Right Updated: 04/26/25 1946     WBC 5.11 Thousand/uL      RBC 4.86 Million/uL      Hemoglobin 15.0 g/dL      Hematocrit 46.1 %      MCV 95 fL      MCH 30.9 pg      MCHC 32.5 g/dL      RDW 14.0 %      MPV 10.6 fL      Platelets 184 Thousands/uL      nRBC 0 /100 WBCs      Segmented % 63 %      Immature Grans % 0 %      Lymphocytes % 25 %      Monocytes % 10 %      Eosinophils Relative 2 %       Basophils Relative 0 %      Absolute Neutrophils 3.19 Thousands/µL      Absolute Immature Grans 0.02 Thousand/uL      Absolute Lymphocytes 1.27 Thousands/µL      Absolute Monocytes 0.53 Thousand/µL      Eosinophils Absolute 0.08 Thousand/µL      Basophils Absolute 0.02 Thousands/µL             No orders to display       Procedures    ED Medication and Procedure Management   Prior to Admission Medications   Prescriptions Last Dose Informant Patient Reported? Taking?   Cholecalciferol (Vitamin D) 50 MCG (2000 UT) CAPS  Self Yes No   Sig: Take by mouth   Cranberry 1000 MG CAPS  Self Yes No   Sig: Take 2,500 mg by mouth daily   Cranberry 400 MG CAPS  Self Yes No   Sig: Take 400 mg by mouth   Diclofenac Sodium (VOLTAREN) 1 %  Self No No   Sig: Apply 4 g topically 3 (three) times a day as needed (knee pain/ back pain)   Menaquinone-7 (K2 PO)  Self Yes No   Sig: Take 50 mg by mouth daily   Zinc Gluconate 50 MG CAPS  Self Yes No   Sig: Take 50 mg by mouth   acetaminophen (TYLENOL) 500 mg tablet  Self Yes No   Sig: Take 500 mg by mouth if needed for mild pain   albuterol (Ventolin HFA) 90 mcg/act inhaler  Self No No   Sig: Inhale 2 puffs every 6 (six) hours as needed for wheezing (Coughing fits) Use PRN prior to coughing fits   Patient not taking: Reported on 6/28/2024   amoxicillin (AMOXIL) 500 MG tablet  Self Yes No   Sig: TAKE 4 TABLETS BY MOUTH ONE HOUR PRIOR TO APPOINTMENT   apixaban (Eliquis) 5 mg  Self Yes No   Sig: Take 5 mg by mouth 2 (two) times a day   ascorbic acid (VITAMIN C) 250 MG CHEW  Self Yes No   Sig: Chew 500 mg daily   chlorhexidine (PERIDEX) 0.12 % solution  Self Yes No   Sig: RINSE MOUTH WITH WITH 15 ML FOR 30 SECONDS IN THE MORNING AND IN THE EVENING AFTER TOOTHBRUSHING. EXPECTORATE AFTER RINSING, DO NOT SWALLOW   estradiol (ESTRACE) 0.1 mg/g vaginal cream  Self Yes No   Sig: PLACE A DIME-SIZED AMOUNT AROUND THE URETHRA 3 TIMES WEEKLY BEFORE BED   famotidine (PEPCID) 40 MG tablet  Self No No    Sig: TAKE ONE TABLET BY MOUTH AT BEDTIME   gabapentin (NEURONTIN) 100 mg capsule  Self No No   Sig: TAKE 1-2 CAPSULES BY MOUTH AT BEDTIME   ketoconazole (NIZORAL) 2 % shampoo  Self No No   Sig: Apply 1 Application topically 2 (two) times a week   omega-3-acid ethyl esters (LOVAZA) 1 g capsule  Self Yes No   Sig: Take 1 g by mouth daily   omeprazole (PriLOSEC) 40 MG capsule  Self No No   Sig: Take 1 capsule (40 mg total) by mouth daily   sertraline (ZOLOFT) 50 mg tablet  Self No No   Sig: Take 1 tablet (50 mg total) by mouth daily      Facility-Administered Medications: None     Discharge Medication List as of 4/26/2025  9:08 PM        CONTINUE these medications which have NOT CHANGED    Details   acetaminophen (TYLENOL) 500 mg tablet Take 500 mg by mouth if needed for mild pain, Historical Med      albuterol (Ventolin HFA) 90 mcg/act inhaler Inhale 2 puffs every 6 (six) hours as needed for wheezing (Coughing fits) Use PRN prior to coughing fits, Starting Tue 2/6/2024, Normal      amoxicillin (AMOXIL) 500 MG tablet TAKE 4 TABLETS BY MOUTH ONE HOUR PRIOR TO APPOINTMENT, Historical Med      apixaban (Eliquis) 5 mg Take 5 mg by mouth 2 (two) times a day, Historical Med      ascorbic acid (VITAMIN C) 250 MG CHEW Chew 500 mg daily, Historical Med      chlorhexidine (PERIDEX) 0.12 % solution RINSE MOUTH WITH WITH 15 ML FOR 30 SECONDS IN THE MORNING AND IN THE EVENING AFTER TOOTHBRUSHING. EXPECTORATE AFTER RINSING, DO NOT SWALLOW, Historical Med      Cholecalciferol (Vitamin D) 50 MCG (2000 UT) CAPS Take by mouth, Historical Med      !! Cranberry 1000 MG CAPS Take 2,500 mg by mouth daily, Historical Med      !! Cranberry 400 MG CAPS Take 400 mg by mouth, Historical Med      Diclofenac Sodium (VOLTAREN) 1 % Apply 4 g topically 3 (three) times a day as needed (knee pain/ back pain), Starting Fri 6/28/2024, Normal      estradiol (ESTRACE) 0.1 mg/g vaginal cream PLACE A DIME-SIZED AMOUNT AROUND THE URETHRA 3 TIMES WEEKLY  BEFORE BED, Historical Med      famotidine (PEPCID) 40 MG tablet TAKE ONE TABLET BY MOUTH AT BEDTIME, Starting Mon 12/30/2024, Normal      gabapentin (NEURONTIN) 100 mg capsule TAKE 1-2 CAPSULES BY MOUTH AT BEDTIME, Starting Fri 1/31/2025, Normal      ketoconazole (NIZORAL) 2 % shampoo Apply 1 Application topically 2 (two) times a week, Starting Mon 9/2/2024, Normal      Menaquinone-7 (K2 PO) Take 50 mg by mouth daily, Historical Med      omega-3-acid ethyl esters (LOVAZA) 1 g capsule Take 1 g by mouth daily, Historical Med      omeprazole (PriLOSEC) 40 MG capsule Take 1 capsule (40 mg total) by mouth daily, Starting Tue 7/16/2024, Normal      sertraline (ZOLOFT) 50 mg tablet Take 1 tablet (50 mg total) by mouth daily, Starting Mon 4/22/2024, Normal      Zinc Gluconate 50 MG CAPS Take 50 mg by mouth, Historical Med       !! - Potential duplicate medications found. Please discuss with provider.        No discharge procedures on file.  ED SEPSIS DOCUMENTATION   Time reflects when diagnosis was documented in both MDM as applicable and the Disposition within this note       Time User Action Codes Description Comment    4/26/2025  9:07 PM Garry Francis Add [G43.909] Migraine                  Garry Francis MD  04/26/25 [id number]

## 2025-04-28 ENCOUNTER — VBI (OUTPATIENT)
Dept: FAMILY MEDICINE CLINIC | Facility: CLINIC | Age: 85
End: 2025-04-28

## 2025-04-28 NOTE — TELEPHONE ENCOUNTER
04/28/25 11:33 AM    Patient contacted post ED visit, [facility] department spoke with patient/caregiver and outreach was successful.    Thank you.  Sita Christian  [facility]

## 2025-05-02 ENCOUNTER — OFFICE VISIT (OUTPATIENT)
Dept: FAMILY MEDICINE CLINIC | Facility: CLINIC | Age: 85
End: 2025-05-02
Payer: MEDICARE

## 2025-05-02 VITALS
DIASTOLIC BLOOD PRESSURE: 84 MMHG | TEMPERATURE: 97.8 F | RESPIRATION RATE: 16 BRPM | BODY MASS INDEX: 26.59 KG/M2 | HEART RATE: 82 BPM | HEIGHT: 65 IN | WEIGHT: 159.6 LBS | OXYGEN SATURATION: 97 % | SYSTOLIC BLOOD PRESSURE: 128 MMHG

## 2025-05-02 DIAGNOSIS — F33.8 SEASONAL AFFECTIVE DISORDER (HCC): ICD-10-CM

## 2025-05-02 DIAGNOSIS — G43.909 MIGRAINE WITHOUT STATUS MIGRAINOSUS, NOT INTRACTABLE, UNSPECIFIED MIGRAINE TYPE: Primary | ICD-10-CM

## 2025-05-02 PROCEDURE — 99213 OFFICE O/P EST LOW 20 MIN: CPT | Performed by: FAMILY MEDICINE

## 2025-05-02 PROCEDURE — G2211 COMPLEX E/M VISIT ADD ON: HCPCS | Performed by: FAMILY MEDICINE

## 2025-05-02 RX ORDER — METHYLPREDNISOLONE 4 MG/1
TABLET ORAL
Qty: 21 EACH | Refills: 0 | Status: SHIPPED | OUTPATIENT
Start: 2025-05-02

## 2025-05-02 NOTE — PATIENT INSTRUCTIONS
Non fasting blood work  If lingering headache persists-start Medrol dose pack  Please increase dose of sertraline from 50 to 75 mg daily (1.5 tablets) every day

## 2025-05-02 NOTE — PROGRESS NOTES
Name: Gail Bradshaw      : 1940      MRN: [id number]  Encounter Provider: Vicky Wilson MD  Encounter Date: 2025   Encounter department: [facility]    Assessment & Plan  Migraine without status migrainosus, not intractable, unspecified migraine type  Worsening of migraine headaches due to stress  Improved after ED visit on 2025, residual lingering symptoms  Overall typical migraine headaches  Proceed with additional labs  If no ongoing improvement-start Medrol Dosepak  Orders:  •  C-reactive protein; Future  •  Sedimentation rate, automated; Future  •  Hepatic function panel; Future  •  Lyme Total AB W Reflex to IGM/IGG; Future  •  methylPREDNISolone 4 MG tablet therapy pack; Use as directed on package    Seasonal affective disorder (HCC)  Orders:  •  sertraline (ZOLOFT) 50 mg tablet; Take 1.5 tablets (75 mg total) by mouth daily    Patient reports increased symptoms of anxiety  Headaches are primarily stress-induced  I recommend to increase dose of sertraline from 50 to 75 mg daily       Patient Instructions   Non fasting blood work  If lingering headache persists-start Medrol dose pack  Please increase dose of sertraline from 50 to 75 mg daily (1.5 tablets) every day    Return if symptoms worsen or fail to improve.    History of Present Illness     Patient presents for follow-up recent emergency room visit for evaluation of headache.  She is here today accompanied by her daughter.  Patient reports history of migraines throughout her life.  She usually develops migraine as a result of significant stress.  Patient reports being under significant pressure and stress due to her 's hernia surgery a week ago.  His surgery went well.  Patient reports starting headache few days prior to his surgery.  Headache is retro-orbital and parietal.  Patient reports nausea and vomiting along with a headache.  No light sensitivity.  Patient stated that she took Excedrin which did  "not help.  She could not eat.  Currently reports that headache improved but did not resolve completely, \"hangover symptoms\".    Patient is afebrile.    ED records reviewed.  No imaging.  Labs unremarkable.  Headache improved after receiving migraine remedies in the ER      Review of Systems   Constitutional:  Positive for fatigue. Negative for fever.   HENT: Negative.     Respiratory: Negative.     Cardiovascular: Negative.    Gastrointestinal: Negative.         Nausea vomiting on the peak of headache, currently asymptomatic   Genitourinary: Negative.    Musculoskeletal: Negative.    Neurological:  Positive for headaches.   Psychiatric/Behavioral:  The patient is nervous/anxious.      Past Medical History:   Diagnosis Date   • Closed compression fracture of fourth lumbar vertebra ([facility]) 11/28/2024   • E coli bacteremia 06/30/2020   • Osteoporosis 12/11/2023   • Paroxysmal atrial fibrillation ([facility]) 12/11/2023   • Pyelonephritis 06/27/2020   • Renal cell cancer, left ([facility]) 12/11/2023   • Seasonal affective disorder ([facility]) 12/11/2023   • Sepsis due to Escherichia coli without acute organ dysfunction ([facility]) 06/27/2020   • Stage 3a chronic kidney disease ([facility]) 12/11/2023     Past Surgical History:   Procedure Laterality Date   • HEMORROIDECTOMY     • REPLACEMENT TOTAL KNEE Right 2004   • TOTAL HIP ARTHROPLASTY Right 2006     Family History   Problem Relation Age of Onset   • Pancreatic cancer Brother      Social History     Tobacco Use   • Smoking status: Never   • Smokeless tobacco: Never   Vaping Use   • Vaping status: Never Used   Substance and Sexual Activity   • Alcohol use: Not Currently   • Drug use: No   • Sexual activity: Not on file     Current Outpatient Medications on File Prior to Visit   Medication Sig   • acetaminophen (TYLENOL) 500 mg tablet Take 500 mg by mouth if needed for mild pain   • amoxicillin (AMOXIL) 500 MG tablet TAKE 4 TABLETS BY MOUTH ONE HOUR PRIOR TO APPOINTMENT   • apixaban (Eliquis) 5 mg " Take 5 mg by mouth 2 (two) times a day   • ascorbic acid (VITAMIN C) 250 MG CHEW Chew 500 mg daily   • chlorhexidine (PERIDEX) 0.12 % solution RINSE MOUTH WITH WITH 15 ML FOR 30 SECONDS IN THE MORNING AND IN THE EVENING AFTER TOOTHBRUSHING. EXPECTORATE AFTER RINSING, DO NOT SWALLOW   • Cholecalciferol (Vitamin D) 50 MCG (2000 UT) CAPS Take by mouth   • Cranberry 1000 MG CAPS Take 2,500 mg by mouth daily   • Cranberry 400 MG CAPS Take 400 mg by mouth   • Diclofenac Sodium (VOLTAREN) 1 % Apply 4 g topically 3 (three) times a day as needed (knee pain/ back pain)   • estradiol (ESTRACE) 0.1 mg/g vaginal cream PLACE A DIME-SIZED AMOUNT AROUND THE URETHRA 3 TIMES WEEKLY BEFORE BED   • famotidine (PEPCID) 40 MG tablet TAKE ONE TABLET BY MOUTH AT BEDTIME   • gabapentin (NEURONTIN) 100 mg capsule TAKE 1-2 CAPSULES BY MOUTH AT BEDTIME   • ketoconazole (NIZORAL) 2 % shampoo Apply 1 Application topically 2 (two) times a week   • Menaquinone-7 (K2 PO) Take 50 mg by mouth daily   • omega-3-acid ethyl esters (LOVAZA) 1 g capsule Take 1 g by mouth daily   • omeprazole (PriLOSEC) 40 MG capsule Take 1 capsule (40 mg total) by mouth daily   • albuterol (Ventolin HFA) 90 mcg/act inhaler Inhale 2 puffs every 6 (six) hours as needed for wheezing (Coughing fits) Use PRN prior to coughing fits (Patient not taking: Reported on 6/28/2024)   • Zinc Gluconate 50 MG CAPS Take 50 mg by mouth (Patient not taking: Reported on 5/2/2025)     Allergies   Allergen Reactions   • Benzocaine Other (See Comments), Vomiting and GI Intolerance     anesthesia-not sure of specific drug   • Ciprofloxacin Abdominal Pain     Able to take   • Risedronate Other (See Comments) and GI Intolerance     Other reaction(s): stomach pains   Other reaction(s): Nausea and Vomiting, Stomach upset   • Nsaids GI Intolerance     Immunization History   Administered Date(s) Administered   • COVID-19 J&J ("[employer]") vaccine 0.5 mL 04/05/2021, 11/21/2021   • COVID-19 Pfizer vac  "(Qamar-sucrose, gray cap) 12 yr+ IM 05/23/2022   • INFLUENZA 11/07/2003, 10/02/2012, 12/19/2022, 11/09/2023   • Influenza Split High Dose Preservative Free IM 11/04/2020   • Influenza, Seasonal Vaccine, Quadrivalent, Adjuvanted, .5e 12/19/2022   • Pneumococcal Conjugate 13-Valent 12/08/2019   • Pneumococcal Polysaccharide PPV23 02/02/2005   • Zoster Vaccine Recombinant 09/26/2021, 01/20/2022   • influenza, trivalent, adjuvanted 09/08/2024     Objective   /84 (BP Location: Left arm, Patient Position: Sitting, Cuff Size: Standard)   Pulse 82   Temp 97.8 °F (36.6 °C) (Temporal)   Resp 16   Ht 5' 5\" (1.651 m)   Wt 72.4 kg (159 lb 9.6 oz)   SpO2 97%   BMI 26.56 kg/m²     Physical Exam  Vitals and nursing note reviewed.   Constitutional:       General: She is not in acute distress.     Appearance: Normal appearance. She is not ill-appearing.   Neurological:      General: No focal deficit present.      Mental Status: She is alert and oriented to person, place, and time.   Psychiatric:         Mood and Affect: Mood normal.         Behavior: Behavior normal.         Thought Content: Thought content normal.         "

## 2025-05-02 NOTE — ASSESSMENT & PLAN NOTE
Orders:  •  sertraline (ZOLOFT) 50 mg tablet; Take 1.5 tablets (75 mg total) by mouth daily    Patient reports increased symptoms of anxiety  Headaches are primarily stress-induced  I recommend to increase dose of sertraline from 50 to 75 mg daily

## 2025-05-05 PROBLEM — G43.909 MIGRAINE WITHOUT STATUS MIGRAINOSUS, NOT INTRACTABLE: Status: ACTIVE | Noted: 2025-05-05

## 2025-05-06 ENCOUNTER — APPOINTMENT (OUTPATIENT)
Dept: LAB | Facility: CLINIC | Age: 85
End: 2025-05-06
Attending: FAMILY MEDICINE
Payer: MEDICARE

## 2025-05-06 DIAGNOSIS — G43.909 MIGRAINE WITHOUT STATUS MIGRAINOSUS, NOT INTRACTABLE, UNSPECIFIED MIGRAINE TYPE: ICD-10-CM

## 2025-05-06 DIAGNOSIS — N18.31 STAGE 3A CHRONIC KIDNEY DISEASE (HCC): ICD-10-CM

## 2025-05-06 DIAGNOSIS — R53.83 OTHER FATIGUE: ICD-10-CM

## 2025-05-06 DIAGNOSIS — E55.9 VITAMIN D DEFICIENCY: ICD-10-CM

## 2025-05-06 DIAGNOSIS — N39.0 URINARY TRACT INFECTION WITHOUT HEMATURIA, SITE UNSPECIFIED: ICD-10-CM

## 2025-05-06 LAB
25(OH)D3 SERPL-MCNC: 63.9 NG/ML (ref 30–100)
ALBUMIN SERPL BCG-MCNC: 4.1 G/DL (ref 3.5–5)
ALP SERPL-CCNC: 60 U/L (ref 34–104)
ALT SERPL W P-5'-P-CCNC: 29 U/L (ref 7–52)
ANION GAP SERPL CALCULATED.3IONS-SCNC: 10 MMOL/L (ref 4–13)
AST SERPL W P-5'-P-CCNC: 30 U/L (ref 13–39)
BASOPHILS # BLD AUTO: 0.03 THOUSANDS/ÂΜL (ref 0–0.1)
BASOPHILS NFR BLD AUTO: 0 % (ref 0–1)
BILIRUB DIRECT SERPL-MCNC: 0.13 MG/DL (ref 0–0.2)
BILIRUB SERPL-MCNC: 0.71 MG/DL (ref 0.2–1)
BUN SERPL-MCNC: 26 MG/DL (ref 5–25)
CALCIUM SERPL-MCNC: 9.4 MG/DL (ref 8.4–10.2)
CHLORIDE SERPL-SCNC: 102 MMOL/L (ref 96–108)
CHOLEST SERPL-MCNC: 227 MG/DL (ref ?–200)
CO2 SERPL-SCNC: 28 MMOL/L (ref 21–32)
CREAT SERPL-MCNC: 0.99 MG/DL (ref 0.6–1.3)
CRP SERPL QL: 3.9 MG/L
EOSINOPHIL # BLD AUTO: 0.04 THOUSAND/ÂΜL (ref 0–0.61)
EOSINOPHIL NFR BLD AUTO: 1 % (ref 0–6)
ERYTHROCYTE [DISTWIDTH] IN BLOOD BY AUTOMATED COUNT: 13.9 % (ref 11.6–15.1)
ERYTHROCYTE [SEDIMENTATION RATE] IN BLOOD: 6 MM/HOUR (ref 0–29)
GFR SERPL CREATININE-BSD FRML MDRD: 52 ML/MIN/1.73SQ M
GLUCOSE P FAST SERPL-MCNC: 101 MG/DL (ref 65–99)
HCT VFR BLD AUTO: 44.6 % (ref 34.8–46.1)
HDLC SERPL-MCNC: 75 MG/DL
HGB BLD-MCNC: 13.7 G/DL (ref 11.5–15.4)
IMM GRANULOCYTES # BLD AUTO: 0.02 THOUSAND/UL (ref 0–0.2)
IMM GRANULOCYTES NFR BLD AUTO: 0 % (ref 0–2)
LDLC SERPL CALC-MCNC: 134 MG/DL (ref 0–100)
LYMPHOCYTES # BLD AUTO: 0.85 THOUSANDS/ÂΜL (ref 0.6–4.47)
LYMPHOCYTES NFR BLD AUTO: 12 % (ref 14–44)
MCH RBC QN AUTO: 30.2 PG (ref 26.8–34.3)
MCHC RBC AUTO-ENTMCNC: 30.7 G/DL (ref 31.4–37.4)
MCV RBC AUTO: 98 FL (ref 82–98)
MONOCYTES # BLD AUTO: 0.34 THOUSAND/ÂΜL (ref 0.17–1.22)
MONOCYTES NFR BLD AUTO: 5 % (ref 4–12)
NEUTROPHILS # BLD AUTO: 6.06 THOUSANDS/ÂΜL (ref 1.85–7.62)
NEUTS SEG NFR BLD AUTO: 82 % (ref 43–75)
NRBC BLD AUTO-RTO: 0 /100 WBCS
PLATELET # BLD AUTO: 204 THOUSANDS/UL (ref 149–390)
PMV BLD AUTO: 11.3 FL (ref 8.9–12.7)
POTASSIUM SERPL-SCNC: 4 MMOL/L (ref 3.5–5.3)
PROT SERPL-MCNC: 6.5 G/DL (ref 6.4–8.4)
RBC # BLD AUTO: 4.54 MILLION/UL (ref 3.81–5.12)
SODIUM SERPL-SCNC: 140 MMOL/L (ref 135–147)
TRIGL SERPL-MCNC: 88 MG/DL (ref ?–150)
TSH SERPL DL<=0.05 MIU/L-ACNC: 2.69 UIU/ML (ref 0.45–4.5)
WBC # BLD AUTO: 7.34 THOUSAND/UL (ref 4.31–10.16)

## 2025-05-06 PROCEDURE — 87186 SC STD MICRODIL/AGAR DIL: CPT

## 2025-05-06 PROCEDURE — 87181 SC STD AGAR DILUTION PER AGT: CPT

## 2025-05-06 PROCEDURE — 85025 COMPLETE CBC W/AUTO DIFF WBC: CPT

## 2025-05-06 PROCEDURE — 85652 RBC SED RATE AUTOMATED: CPT

## 2025-05-06 PROCEDURE — 87086 URINE CULTURE/COLONY COUNT: CPT

## 2025-05-06 PROCEDURE — 82306 VITAMIN D 25 HYDROXY: CPT

## 2025-05-06 PROCEDURE — 36415 COLL VENOUS BLD VENIPUNCTURE: CPT

## 2025-05-06 PROCEDURE — 80053 COMPREHEN METABOLIC PANEL: CPT

## 2025-05-06 PROCEDURE — 80061 LIPID PANEL: CPT

## 2025-05-06 PROCEDURE — 87077 CULTURE AEROBIC IDENTIFY: CPT

## 2025-05-06 PROCEDURE — 86140 C-REACTIVE PROTEIN: CPT

## 2025-05-06 PROCEDURE — 86618 LYME DISEASE ANTIBODY: CPT

## 2025-05-06 PROCEDURE — 84443 ASSAY THYROID STIM HORMONE: CPT

## 2025-05-06 PROCEDURE — 82248 BILIRUBIN DIRECT: CPT

## 2025-05-06 NOTE — ASSESSMENT & PLAN NOTE
Worsening of migraine headaches due to stress  Improved after ED visit on 4/26/2025, residual lingering symptoms  Overall typical migraine headaches  Proceed with additional labs  If no ongoing improvement-start Medrol Dosepak  Orders:  •  C-reactive protein; Future  •  Sedimentation rate, automated; Future  •  Hepatic function panel; Future  •  Lyme Total AB W Reflex to IGM/IGG; Future  •  methylPREDNISolone 4 MG tablet therapy pack; Use as directed on package

## 2025-05-07 LAB — B BURGDOR IGG+IGM SER QL IA: NEGATIVE

## 2025-05-08 ENCOUNTER — OFFICE VISIT (OUTPATIENT)
Dept: FAMILY MEDICINE CLINIC | Facility: CLINIC | Age: 85
End: 2025-05-08
Payer: MEDICARE

## 2025-05-08 ENCOUNTER — TELEPHONE (OUTPATIENT)
Age: 85
End: 2025-05-08

## 2025-05-08 VITALS
RESPIRATION RATE: 16 BRPM | OXYGEN SATURATION: 98 % | WEIGHT: 157.6 LBS | SYSTOLIC BLOOD PRESSURE: 132 MMHG | BODY MASS INDEX: 26.26 KG/M2 | TEMPERATURE: 97.6 F | DIASTOLIC BLOOD PRESSURE: 88 MMHG | HEIGHT: 65 IN | HEART RATE: 99 BPM

## 2025-05-08 DIAGNOSIS — N30.00 ACUTE CYSTITIS WITHOUT HEMATURIA: ICD-10-CM

## 2025-05-08 LAB
SL AMB  POCT GLUCOSE, UA: ABNORMAL
SL AMB LEUKOCYTE ESTERASE,UA: ABNORMAL
SL AMB POCT BILIRUBIN,UA: ABNORMAL
SL AMB POCT BLOOD,UA: ABNORMAL
SL AMB POCT CLARITY,UA: ABNORMAL
SL AMB POCT COLOR,UA: YELLOW
SL AMB POCT KETONES,UA: ABNORMAL
SL AMB POCT NITRITE,UA: ABNORMAL
SL AMB POCT PH,UA: 6
SL AMB POCT SPECIFIC GRAVITY,UA: 1.01
SL AMB POCT URINE PROTEIN: ABNORMAL
SL AMB POCT UROBILINOGEN: ABNORMAL

## 2025-05-08 PROCEDURE — 81002 URINALYSIS NONAUTO W/O SCOPE: CPT | Performed by: FAMILY MEDICINE

## 2025-05-08 PROCEDURE — 99213 OFFICE O/P EST LOW 20 MIN: CPT | Performed by: FAMILY MEDICINE

## 2025-05-08 PROCEDURE — G2211 COMPLEX E/M VISIT ADD ON: HCPCS | Performed by: FAMILY MEDICINE

## 2025-05-08 NOTE — TELEPHONE ENCOUNTER
Please asked the patient to bring the urine sample.  Okay to offer 2 PM appointment today.  Thank you

## 2025-05-08 NOTE — TELEPHONE ENCOUNTER
Patient called stating the had already taken 4 500 mg pills of Amox this morning for a dentist appt. She wants to know when she should take the amoxicillin-clavulanate (AUGMENTIN) today. Please advise.    Thanks

## 2025-05-08 NOTE — PROGRESS NOTES
Name: Gail Bradshaw      : 1940      MRN: [id number]  Encounter Provider: Vicky Wilson MD  Encounter Date: 2025   Encounter department: [facility]    Assessment & Plan  Acute cystitis without hematuria  Patient presents for evaluation of UTI x 2 days.  Nontoxic and afebrile.  No flank pain or hematuria.  Presents with urgency, frequency, incomplete emptying.  Previous UTI in early 2025, treated with Keflex by urgent care center.  She just had labs and repeated urine culture that was ordered by urgent care center a few months ago.  Patient states that she was asymptomatic at the time of urine culture collection, but became symptomatic shortly after  Urine culture reveals more than 100,000 growth of ESBL resistant E. coli.  Start Augmentin as per C&S  Advised isolation precautions/wiping toilet seat  Repeat UA C&S after completing antibiotic  Force fluids  Patient will contact me in the few days if symptoms are not improving  Orders:  •  Urine culture; Future  •  POCT urine dip  •  amoxicillin-clavulanate (AUGMENTIN) 875-125 mg per tablet; Take 1 tablet by mouth 2 (two) times a day for 7 days  •  Urine culture  •  Urinalysis with microscopic; Future         History of Present Illness     Patient presents for evaluation of urinary tract symptoms.  She became symptomatic within past 2 days.  Reports urinary frequency, urgency, urinates small amounts every 2030 minutes.  Denies symptoms of fever, back pain or gross hematuria.  History of UTIs in the past.  Was seen at urgent care center in early March and was treated with Keflex.      Apparently follow-up urine culture was ordered by urgent care PA but patient just had it done few days ago.  Results of urine culture reviewed.  E. coli more than 100 K, ESBL resistant .    Results of recent blood work reviewed.  Patient reports that headaches have completely resolved.  She completed course of Medrol Dosepak as ordered during  last office visit.    Urinary Tract Infection   Associated symptoms include frequency and urgency. Pertinent negatives include no flank pain or hematuria.     Review of Systems   Constitutional: Negative.  Negative for fatigue and fever.   Respiratory: Negative.     Cardiovascular: Negative.    Gastrointestinal: Negative.    Genitourinary:  Positive for dysuria, frequency and urgency. Negative for flank pain and hematuria.   Neurological:  Negative for headaches.     Past Medical History:   Diagnosis Date   • Closed compression fracture of fourth lumbar vertebra ([facility]) 11/28/2024   • E coli bacteremia 06/30/2020   • Osteoporosis 12/11/2023   • Paroxysmal atrial fibrillation (HCC) 12/11/2023   • Pyelonephritis 06/27/2020   • Renal cell cancer, left ([facility]) 12/11/2023   • Seasonal affective disorder ([facility]) 12/11/2023   • Sepsis due to Escherichia coli without acute organ dysfunction ([facility]) 06/27/2020   • Stage 3a chronic kidney disease ([facility]) 12/11/2023     Past Surgical History:   Procedure Laterality Date   • HEMORROIDECTOMY     • REPLACEMENT TOTAL KNEE Right 2004   • TOTAL HIP ARTHROPLASTY Right 2006     Family History   Problem Relation Age of Onset   • Pancreatic cancer Brother      Social History     Tobacco Use   • Smoking status: Never   • Smokeless tobacco: Never   Vaping Use   • Vaping status: Never Used   Substance and Sexual Activity   • Alcohol use: Not Currently   • Drug use: No   • Sexual activity: Not on file     Current Outpatient Medications on File Prior to Visit   Medication Sig   • acetaminophen (TYLENOL) 500 mg tablet Take 500 mg by mouth if needed for mild pain   • apixaban (Eliquis) 5 mg Take 5 mg by mouth 2 (two) times a day   • ascorbic acid (VITAMIN C) 250 MG CHEW Chew 500 mg daily   • chlorhexidine (PERIDEX) 0.12 % solution RINSE MOUTH WITH WITH 15 ML FOR 30 SECONDS IN THE MORNING AND IN THE EVENING AFTER TOOTHBRUSHING. EXPECTORATE AFTER RINSING, DO NOT SWALLOW   • Cholecalciferol (Vitamin D) 50  MCG (2000 UT) CAPS Take by mouth   • Cranberry 1000 MG CAPS Take 2,500 mg by mouth daily   • Cranberry 400 MG CAPS Take 400 mg by mouth   • Diclofenac Sodium (VOLTAREN) 1 % Apply 4 g topically 3 (three) times a day as needed (knee pain/ back pain)   • estradiol (ESTRACE) 0.1 mg/g vaginal cream PLACE A DIME-SIZED AMOUNT AROUND THE URETHRA 3 TIMES WEEKLY BEFORE BED   • famotidine (PEPCID) 40 MG tablet TAKE ONE TABLET BY MOUTH AT BEDTIME   • gabapentin (NEURONTIN) 100 mg capsule TAKE 1-2 CAPSULES BY MOUTH AT BEDTIME   • ketoconazole (NIZORAL) 2 % shampoo Apply 1 Application topically 2 (two) times a week   • Menaquinone-7 (K2 PO) Take 50 mg by mouth daily   • methylPREDNISolone 4 MG tablet therapy pack Use as directed on package   • omega-3-acid ethyl esters (LOVAZA) 1 g capsule Take 1 g by mouth daily   • omeprazole (PriLOSEC) 40 MG capsule Take 1 capsule (40 mg total) by mouth daily   • sertraline (ZOLOFT) 50 mg tablet Take 1.5 tablets (75 mg total) by mouth daily   • albuterol (Ventolin HFA) 90 mcg/act inhaler Inhale 2 puffs every 6 (six) hours as needed for wheezing (Coughing fits) Use PRN prior to coughing fits (Patient not taking: Reported on 6/28/2024)   • Zinc Gluconate 50 MG CAPS Take 50 mg by mouth (Patient not taking: Reported on 5/2/2025)     Allergies   Allergen Reactions   • Benzocaine Other (See Comments), Vomiting and GI Intolerance     anesthesia-not sure of specific drug   • Ciprofloxacin Abdominal Pain     Able to take   • Risedronate Other (See Comments) and GI Intolerance     Other reaction(s): stomach pains   Other reaction(s): Nausea and Vomiting, Stomach upset   • Nsaids GI Intolerance     Immunization History   Administered Date(s) Administered   • COVID-19 J&J ([employer]) vaccine 0.5 mL 04/05/2021, 11/21/2021   • COVID-19 Pfizer vac (Qamar-sucrose, gray cap) 12 yr+ IM 05/23/2022   • INFLUENZA 11/07/2003, 10/02/2012, 12/19/2022, 11/09/2023   • Influenza Split High Dose Preservative Free IM  "11/04/2020   • Influenza, Seasonal Vaccine, Quadrivalent, Adjuvanted, .5e 12/19/2022   • Pneumococcal Conjugate 13-Valent 12/08/2019   • Pneumococcal Polysaccharide PPV23 02/02/2005   • Zoster Vaccine Recombinant 09/26/2021, 01/20/2022   • influenza, trivalent, adjuvanted 09/08/2024     Objective   /88 (BP Location: Left arm, Patient Position: Sitting, Cuff Size: Standard)   Pulse 99   Temp 97.6 °F (36.4 °C) (Temporal)   Resp 16   Ht 5' 5\" (1.651 m)   Wt 71.5 kg (157 lb 9.6 oz)   SpO2 98%   BMI 26.23 kg/m²     Physical Exam  Vitals and nursing note reviewed.   Constitutional:       General: She is not in acute distress.     Appearance: Normal appearance. She is not ill-appearing.   Neurological:      General: No focal deficit present.      Mental Status: She is alert and oriented to person, place, and time.   Psychiatric:         Mood and Affect: Mood normal.         Behavior: Behavior normal.       Results for orders placed or performed in visit on 05/08/25   POCT urine dip   Result Value Ref Range    LEUKOCYTE ESTERASE,UA 2+     NITRITE,UA -     SL AMB POCT UROBILINOGEN -     POCT URINE PROTEIN -      PH,UA 6.0     BLOOD,UA 1+     SPECIFIC GRAVITY,UA 1.015     KETONES,UA -     BILIRUBIN,UA -     GLUCOSE, UA -      COLOR,UA yellow     CLARITY,UA cloudy          "

## 2025-05-08 NOTE — TELEPHONE ENCOUNTER
Patient is calling to follow up on urine test and to inform provider that she is experiencing UTI symptoms such as burning and frequency, she would like to know if something can be prescribed    Please review and advise

## 2025-05-09 LAB
BACTERIA UR CULT: ABNORMAL
BACTERIA UR CULT: ABNORMAL

## 2025-05-19 ENCOUNTER — TELEPHONE (OUTPATIENT)
Age: 85
End: 2025-05-19

## 2025-05-19 ENCOUNTER — CLINICAL SUPPORT (OUTPATIENT)
Dept: FAMILY MEDICINE CLINIC | Facility: CLINIC | Age: 85
End: 2025-05-19
Payer: MEDICARE

## 2025-05-19 DIAGNOSIS — R39.9 UTI SYMPTOMS: Primary | ICD-10-CM

## 2025-05-19 LAB
SL AMB  POCT GLUCOSE, UA: NORMAL
SL AMB LEUKOCYTE ESTERASE,UA: NORMAL
SL AMB POCT BILIRUBIN,UA: NORMAL
SL AMB POCT BLOOD,UA: NORMAL
SL AMB POCT CLARITY,UA: CLEAR
SL AMB POCT COLOR,UA: YELLOW
SL AMB POCT KETONES,UA: NORMAL
SL AMB POCT NITRITE,UA: NORMAL
SL AMB POCT PH,UA: 5.5
SL AMB POCT SPECIFIC GRAVITY,UA: 1.01
SL AMB POCT URINE PROTEIN: NORMAL
SL AMB POCT UROBILINOGEN: NORMAL

## 2025-05-19 PROCEDURE — 81002 URINALYSIS NONAUTO W/O SCOPE: CPT

## 2025-05-19 PROCEDURE — 87086 URINE CULTURE/COLONY COUNT: CPT | Performed by: FAMILY MEDICINE

## 2025-05-19 PROCEDURE — 99211 OFF/OP EST MAY X REQ PHY/QHP: CPT

## 2025-05-19 NOTE — TELEPHONE ENCOUNTER
Patient will be in the office tomorrow with her  for his appointment and will plan to provide repeat urine sample at that time. Patient just wanted to inform provider.

## 2025-05-20 ENCOUNTER — RESULTS FOLLOW-UP (OUTPATIENT)
Dept: FAMILY MEDICINE CLINIC | Facility: CLINIC | Age: 85
End: 2025-05-20

## 2025-05-20 LAB — BACTERIA UR CULT: NORMAL

## 2025-05-29 ENCOUNTER — TELEPHONE (OUTPATIENT)
Age: 85
End: 2025-05-29

## 2025-05-29 NOTE — TELEPHONE ENCOUNTER
Pt called to request a script for a urine culture.  Pt was seen in the office on 5.8.25 and is still experiencing UTI symptoms including buring sensation while urinating and frequency.    Pt request a call back when the script is ready and/or next steps.

## 2025-05-29 NOTE — TELEPHONE ENCOUNTER
"Called and spoke with patient and inform patient provider placed order for Urine Culture/Microscopic.   Patient verbalized understanding and also states \" Thank her so much, and thank you for the call. I will go tomorrow to the [facility] next to your office \"  "

## 2025-05-30 ENCOUNTER — APPOINTMENT (OUTPATIENT)
Dept: LAB | Facility: CLINIC | Age: 85
End: 2025-05-30
Payer: MEDICARE

## 2025-05-30 DIAGNOSIS — N30.00 ACUTE CYSTITIS WITHOUT HEMATURIA: ICD-10-CM

## 2025-05-30 LAB
BACTERIA UR QL AUTO: ABNORMAL /HPF
BILIRUB UR QL STRIP: NEGATIVE
CLARITY UR: CLEAR
COLOR UR: ABNORMAL
GLUCOSE UR STRIP-MCNC: NEGATIVE MG/DL
HGB UR QL STRIP.AUTO: ABNORMAL
KETONES UR STRIP-MCNC: NEGATIVE MG/DL
LEUKOCYTE ESTERASE UR QL STRIP: ABNORMAL
NITRITE UR QL STRIP: NEGATIVE
NON-SQ EPI CELLS URNS QL MICRO: ABNORMAL /HPF
PH UR STRIP.AUTO: 7 [PH]
PROT UR STRIP-MCNC: NEGATIVE MG/DL
RBC #/AREA URNS AUTO: ABNORMAL /HPF
SP GR UR STRIP.AUTO: 1.01 (ref 1–1.03)
UROBILINOGEN UR STRIP-ACNC: <2 MG/DL
WBC #/AREA URNS AUTO: ABNORMAL /HPF

## 2025-05-30 PROCEDURE — 81001 URINALYSIS AUTO W/SCOPE: CPT

## 2025-05-31 ENCOUNTER — RESULTS FOLLOW-UP (OUTPATIENT)
Dept: FAMILY MEDICINE CLINIC | Facility: CLINIC | Age: 85
End: 2025-05-31

## 2025-05-31 LAB — BACTERIA UR CULT: NORMAL

## 2025-06-03 ENCOUNTER — TELEPHONE (OUTPATIENT)
Age: 85
End: 2025-06-03

## 2025-06-03 ENCOUNTER — OFFICE VISIT (OUTPATIENT)
Dept: FAMILY MEDICINE CLINIC | Facility: CLINIC | Age: 85
End: 2025-06-03
Payer: MEDICARE

## 2025-06-03 VITALS
BODY MASS INDEX: 26.23 KG/M2 | HEART RATE: 78 BPM | DIASTOLIC BLOOD PRESSURE: 86 MMHG | WEIGHT: 157.4 LBS | SYSTOLIC BLOOD PRESSURE: 124 MMHG | TEMPERATURE: 98.1 F | RESPIRATION RATE: 16 BRPM | OXYGEN SATURATION: 96 % | HEIGHT: 65 IN

## 2025-06-03 DIAGNOSIS — R39.9 UTI SYMPTOMS: Primary | ICD-10-CM

## 2025-06-03 LAB
SL AMB  POCT GLUCOSE, UA: ABNORMAL
SL AMB LEUKOCYTE ESTERASE,UA: ABNORMAL
SL AMB POCT BILIRUBIN,UA: ABNORMAL
SL AMB POCT BLOOD,UA: ABNORMAL
SL AMB POCT CLARITY,UA: ABNORMAL
SL AMB POCT COLOR,UA: YELLOW
SL AMB POCT KETONES,UA: ABNORMAL
SL AMB POCT NITRITE,UA: ABNORMAL
SL AMB POCT PH,UA: 5.5
SL AMB POCT SPECIFIC GRAVITY,UA: 1.01
SL AMB POCT URINE PROTEIN: ABNORMAL
SL AMB POCT UROBILINOGEN: ABNORMAL

## 2025-06-03 PROCEDURE — 99213 OFFICE O/P EST LOW 20 MIN: CPT | Performed by: NURSE PRACTITIONER

## 2025-06-03 PROCEDURE — 87086 URINE CULTURE/COLONY COUNT: CPT | Performed by: NURSE PRACTITIONER

## 2025-06-03 PROCEDURE — G2211 COMPLEX E/M VISIT ADD ON: HCPCS | Performed by: NURSE PRACTITIONER

## 2025-06-03 PROCEDURE — 81002 URINALYSIS NONAUTO W/O SCOPE: CPT | Performed by: NURSE PRACTITIONER

## 2025-06-03 RX ORDER — DOXYCYCLINE HYCLATE 100 MG
100 TABLET ORAL 2 TIMES DAILY
Qty: 14 TABLET | Refills: 0 | Status: SHIPPED | OUTPATIENT
Start: 2025-06-03 | End: 2025-06-10

## 2025-06-03 NOTE — PROGRESS NOTES
"Name: Gail Bradshaw      : 1940      MRN: [id number]  Encounter Provider: TAMIE Steele  Encounter Date: 6/3/2025   Encounter department: [facility]  :  Assessment & Plan  UTI symptoms  3rd episode of UTI symptoms this month.   Treated  with Augmentin on  with positive urine culture for >100,000 e.coli, ESBL.    repeat culture  30,000-39,000 mixed contaminants.    repeat culture 50,000-59,000 contaminants.   Urine dip today positive for +3 leuks and +3 blood.   Reviewed urine culture from  bacteria susceptible to augmentin, macrobid, tetracycline, bactrim.   Will start doxycycline for possible UTI, while urine culture is pending.   We discusses at length use of estrace topical cream to external genitalia, rationale for using this, how to apply, frequency, and importance of regular use for UTI prevention and atrophic vaginitis, which can also cause UTI like symptoms.   Verbalizes understanding.     Orders:  •  POCT urine dip  •  Urine culture; Future  •  doxycycline hyclate (VIBRA-TABS) 100 mg tablet; Take 1 tablet (100 mg total) by mouth 2 (two) times a day for 7 days           History of Present Illness   Gail Bradshaw is an 85 year old female presenting today to for UTI symptoms.     This is the 3rd time in the last one month she has UTI symptoms.     Dysuria at end of void, frequency and urgency.     Estrace cream--has this, not using regularly.             Review of Systems   Constitutional: Negative.    Gastrointestinal:  Negative for nausea and vomiting.   Genitourinary:  Positive for dysuria, frequency and urgency. Negative for flank pain, hematuria and pelvic pain.       Objective   /86 (BP Location: Left arm, Patient Position: Sitting, Cuff Size: Standard)   Pulse 78   Temp 98.1 °F (36.7 °C) (Tympanic)   Resp 16   Ht 5' 5\" (1.651 m)   Wt 71.4 kg (157 lb 6.4 oz)   LMP  (LMP Unknown)   SpO2 96%   BMI 26.19 kg/m²      Physical Exam  Vitals and " nursing note reviewed.   Constitutional:       General: She is not in acute distress.     Appearance: Normal appearance. She is not ill-appearing.     Cardiovascular:      Rate and Rhythm: Normal rate. Rhythm irregular.      Heart sounds: No murmur heard.     Comments: Irregularly irregular rhythm  Pulmonary:      Effort: Pulmonary effort is normal.      Breath sounds: Normal breath sounds.   Abdominal:      General: Abdomen is flat.      Palpations: Abdomen is soft.      Tenderness: There is no abdominal tenderness. There is no right CVA tenderness or left CVA tenderness.     Musculoskeletal:      Right lower leg: No edema.      Left lower leg: No edema.     Neurological:      Mental Status: She is alert.     Psychiatric:         Mood and Affect: Mood normal.       Current Medications[1]          [1]    Current Outpatient Medications:   •  acetaminophen (TYLENOL) 500 mg tablet, Take 500 mg by mouth if needed for mild pain, Disp: , Rfl:   •  apixaban (Eliquis) 5 mg, Take 5 mg by mouth in the morning and 5 mg in the evening., Disp: , Rfl:   •  ascorbic acid (VITAMIN C) 250 MG CHEW, Chew 500 mg in the morning., Disp: , Rfl:   •  chlorhexidine (PERIDEX) 0.12 % solution, , Disp: , Rfl:   •  Cholecalciferol (Vitamin D) 50 MCG (2000 UT) CAPS, Take by mouth, Disp: , Rfl:   •  Cranberry 1000 MG CAPS, Take 2,500 mg by mouth in the morning., Disp: , Rfl:   •  Cranberry 400 MG CAPS, Take 400 mg by mouth, Disp: , Rfl:   •  Diclofenac Sodium (VOLTAREN) 1 %, Apply 4 g topically 3 (three) times a day as needed (knee pain/ back pain), Disp: 350 g, Rfl: 2  •  doxycycline hyclate (VIBRA-TABS) 100 mg tablet, Take 1 tablet (100 mg total) by mouth 2 (two) times a day for 7 days, Disp: 14 tablet, Rfl: 0  •  estradiol (ESTRACE) 0.1 mg/g vaginal cream, , Disp: , Rfl:   •  famotidine (PEPCID) 40 MG tablet, TAKE ONE TABLET BY MOUTH AT BEDTIME, Disp: 90 tablet, Rfl: 1  •  gabapentin (NEURONTIN) 100 mg capsule, TAKE 1-2 CAPSULES BY MOUTH AT  BEDTIME, Disp: 60 capsule, Rfl: 5  •  ketoconazole (NIZORAL) 2 % shampoo, Apply 1 Application topically 2 (two) times a week, Disp: 120 mL, Rfl: 1  •  Menaquinone-7 (K2 PO), Take 50 mg by mouth in the morning., Disp: , Rfl:   •  methylPREDNISolone 4 MG tablet therapy pack, Use as directed on package, Disp: 21 each, Rfl: 0  •  omega-3-acid ethyl esters (LOVAZA) 1 g capsule, Take 1 g by mouth in the morning., Disp: , Rfl:   •  omeprazole (PriLOSEC) 40 MG capsule, Take 1 capsule (40 mg total) by mouth daily, Disp: 90 capsule, Rfl: 3  •  sertraline (ZOLOFT) 50 mg tablet, Take 1.5 tablets (75 mg total) by mouth daily, Disp: 135 tablet, Rfl: 3  •  albuterol (Ventolin HFA) 90 mcg/act inhaler, Inhale 2 puffs every 6 (six) hours as needed for wheezing (Coughing fits) Use PRN prior to coughing fits (Patient not taking: Reported on 6/28/2024), Disp: 6.7 g, Rfl: 1  •  Zinc Gluconate 50 MG CAPS, Take 50 mg by mouth (Patient not taking: Reported on 5/2/2025), Disp: , Rfl:

## 2025-06-03 NOTE — TELEPHONE ENCOUNTER
Patient called asking why she was given an antibiotic before her culture came back. She was wondering if she needed to take it. Told patient we like to treat right away with a broad spectrum antibiotic to help alleviate symptoms and then once the culture comes back we determine if the current antibiotic is still appropriate or if we need to switch to something more effective. Patient understood and asked how long a culture takes. Stated that it could take a few days depending on if there is growth and how much.      Patient would like a call back when the culture comes back to ensure she is treating it appropriately.

## 2025-06-04 LAB — BACTERIA UR CULT: NORMAL

## 2025-06-05 ENCOUNTER — RESULTS FOLLOW-UP (OUTPATIENT)
Dept: FAMILY MEDICINE CLINIC | Facility: CLINIC | Age: 85
End: 2025-06-05

## 2025-06-25 DIAGNOSIS — L21.9 SEBORRHEA: ICD-10-CM

## 2025-06-25 RX ORDER — KETOCONAZOLE 20 MG/ML
SHAMPOO, SUSPENSION TOPICAL
Qty: 120 ML | Refills: 5 | Status: SHIPPED | OUTPATIENT
Start: 2025-06-25

## 2025-07-14 DIAGNOSIS — K21.9 CHRONIC GERD: ICD-10-CM

## 2025-07-15 RX ORDER — FAMOTIDINE 40 MG/1
40 TABLET, FILM COATED ORAL
Qty: 90 TABLET | Refills: 1 | Status: SHIPPED | OUTPATIENT
Start: 2025-07-15

## 2025-07-28 DIAGNOSIS — K21.9 CHRONIC GERD: ICD-10-CM

## 2025-07-29 RX ORDER — OMEPRAZOLE 40 MG/1
40 CAPSULE, DELAYED RELEASE ORAL DAILY
Qty: 90 CAPSULE | Refills: 1 | Status: SHIPPED | OUTPATIENT
Start: 2025-07-29

## 2025-08-11 ENCOUNTER — OFFICE VISIT (OUTPATIENT)
Dept: FAMILY MEDICINE CLINIC | Facility: CLINIC | Age: 85
End: 2025-08-11
Payer: MEDICARE